# Patient Record
Sex: MALE | NOT HISPANIC OR LATINO | Employment: UNEMPLOYED | URBAN - METROPOLITAN AREA
[De-identification: names, ages, dates, MRNs, and addresses within clinical notes are randomized per-mention and may not be internally consistent; named-entity substitution may affect disease eponyms.]

---

## 2018-06-25 ENCOUNTER — TRANSFERRED RECORDS (OUTPATIENT)
Dept: HEALTH INFORMATION MANAGEMENT | Facility: CLINIC | Age: 48
End: 2018-06-25

## 2018-12-17 DIAGNOSIS — Z21 HIV (HUMAN IMMUNODEFICIENCY VIRUS INFECTION) (H): ICD-10-CM

## 2018-12-17 DIAGNOSIS — B20 HUMAN IMMUNODEFICIENCY VIRUS (HIV) DISEASE (H): Primary | ICD-10-CM

## 2018-12-17 DIAGNOSIS — Z21 HIV (HUMAN IMMUNODEFICIENCY VIRUS INFECTION) (H): Primary | ICD-10-CM

## 2018-12-17 LAB
ALBUMIN SERPL-MCNC: 3.8 G/DL (ref 3.4–5)
ALP SERPL-CCNC: 67 U/L (ref 40–150)
ALT SERPL W P-5'-P-CCNC: 28 U/L (ref 0–70)
ANION GAP SERPL CALCULATED.3IONS-SCNC: 9 MMOL/L (ref 3–14)
AST SERPL W P-5'-P-CCNC: 12 U/L (ref 0–45)
BASOPHILS # BLD AUTO: 0.1 10E9/L (ref 0–0.2)
BASOPHILS NFR BLD AUTO: 0.8 %
BILIRUB SERPL-MCNC: 0.4 MG/DL (ref 0.2–1.3)
BUN SERPL-MCNC: 9 MG/DL (ref 7–30)
CALCIUM SERPL-MCNC: 9 MG/DL (ref 8.5–10.1)
CHLORIDE SERPL-SCNC: 107 MMOL/L (ref 94–109)
CO2 SERPL-SCNC: 23 MMOL/L (ref 20–32)
CREAT SERPL-MCNC: 0.9 MG/DL (ref 0.66–1.25)
DIFFERENTIAL METHOD BLD: NORMAL
EOSINOPHIL # BLD AUTO: 0.2 10E9/L (ref 0–0.7)
EOSINOPHIL NFR BLD AUTO: 1.5 %
ERYTHROCYTE [DISTWIDTH] IN BLOOD BY AUTOMATED COUNT: 12.4 % (ref 10–15)
GFR SERPL CREATININE-BSD FRML MDRD: 89 ML/MIN/1.7M2
GLUCOSE SERPL-MCNC: 85 MG/DL (ref 70–99)
HCT VFR BLD AUTO: 49.2 % (ref 40–53)
HGB BLD-MCNC: 16.6 G/DL (ref 13.3–17.7)
IMM GRANULOCYTES # BLD: 0 10E9/L (ref 0–0.4)
IMM GRANULOCYTES NFR BLD: 0.1 %
LYMPHOCYTES # BLD AUTO: 3.2 10E9/L (ref 0.8–5.3)
LYMPHOCYTES NFR BLD AUTO: 32.6 %
MCH RBC QN AUTO: 31.7 PG (ref 26.5–33)
MCHC RBC AUTO-ENTMCNC: 33.7 G/DL (ref 31.5–36.5)
MCV RBC AUTO: 94 FL (ref 78–100)
MONOCYTES # BLD AUTO: 0.5 10E9/L (ref 0–1.3)
MONOCYTES NFR BLD AUTO: 5.2 %
NEUTROPHILS # BLD AUTO: 5.8 10E9/L (ref 1.6–8.3)
NEUTROPHILS NFR BLD AUTO: 59.8 %
NRBC # BLD AUTO: 0 10*3/UL
NRBC BLD AUTO-RTO: 0 /100
PLATELET # BLD AUTO: 202 10E9/L (ref 150–450)
POTASSIUM SERPL-SCNC: 3.9 MMOL/L (ref 3.4–5.3)
PROT SERPL-MCNC: 7.5 G/DL (ref 6.8–8.8)
RBC # BLD AUTO: 5.24 10E12/L (ref 4.4–5.9)
SODIUM SERPL-SCNC: 139 MMOL/L (ref 133–144)
WBC # BLD AUTO: 9.8 10E9/L (ref 4–11)

## 2018-12-17 PROCEDURE — 86359 T CELLS TOTAL COUNT: CPT | Performed by: INTERNAL MEDICINE

## 2018-12-17 PROCEDURE — 87536 HIV-1 QUANT&REVRSE TRNSCRPJ: CPT | Performed by: INTERNAL MEDICINE

## 2018-12-17 PROCEDURE — 86360 T CELL ABSOLUTE COUNT/RATIO: CPT | Performed by: INTERNAL MEDICINE

## 2018-12-17 NOTE — PROGRESS NOTES
OK per Dr. Lugo to order refills: Descovy, 1 tablet daily and Tivicay 50 mg, 1 tablet daily. Pt just arrived in [place] and is establishing care. He has 3 tablets of each medication left. Verified medication/doses per prescription bottles.      Padma Snyder, [facility] Pharmacist.   [phone]

## 2018-12-17 NOTE — PROGRESS NOTES
Per [facility] Ambulatory Care Protocol, Pt is due for routine labs based on disease state or monitoring of medications. Lab orders entered per clinic protocol.  Lillie Garcia RN

## 2018-12-18 LAB
CD3 CELLS # BLD: 2778 CELLS/UL (ref 603–2990)
CD3 CELLS NFR BLD: 79 % (ref 49–84)
CD3+CD4+ CELLS # BLD: 1630 CELLS/UL (ref 441–2156)
CD3+CD4+ CELLS NFR BLD: 46 % (ref 28–63)
CD3+CD4+ CELLS/CD3+CD8+ CLL BLD: 1.53 % (ref 1.4–2.6)
CD3+CD8+ CELLS # BLD: 1066 CELLS/UL (ref 125–1312)
CD3+CD8+ CELLS NFR BLD: 30 % (ref 10–40)
IFC SPECIMEN: NORMAL

## 2018-12-19 LAB
HIV1 RNA # PLAS NAA DL=20: 42 {COPIES}/ML
HIV1 RNA SERPL NAA+PROBE-LOG#: 1.6 {LOG_COPIES}/ML

## 2019-01-11 DIAGNOSIS — B20 HUMAN IMMUNODEFICIENCY VIRUS (HIV) DISEASE (H): ICD-10-CM

## 2019-02-04 ENCOUNTER — OFFICE VISIT (OUTPATIENT)
Dept: INFECTIOUS DISEASES | Facility: CLINIC | Age: 49
End: 2019-02-04
Attending: INTERNAL MEDICINE
Payer: COMMERCIAL

## 2019-02-04 VITALS
HEART RATE: 78 BPM | HEIGHT: 68 IN | BODY MASS INDEX: 22.28 KG/M2 | WEIGHT: 147 LBS | TEMPERATURE: 97.7 F | DIASTOLIC BLOOD PRESSURE: 72 MMHG | SYSTOLIC BLOOD PRESSURE: 111 MMHG | OXYGEN SATURATION: 99 %

## 2019-02-04 DIAGNOSIS — B20 HUMAN IMMUNODEFICIENCY VIRUS (HIV) DISEASE (H): Primary | ICD-10-CM

## 2019-02-04 DIAGNOSIS — Z11.1 SCREENING FOR TUBERCULOSIS: ICD-10-CM

## 2019-02-04 DIAGNOSIS — A63.0 GENITAL WARTS DUE TO HPV (HUMAN PAPILLOMAVIRUS): ICD-10-CM

## 2019-02-04 DIAGNOSIS — Z11.3 SCREEN FOR SEXUALLY TRANSMITTED DISEASES: ICD-10-CM

## 2019-02-04 DIAGNOSIS — K02.9 DENTAL CARIES: ICD-10-CM

## 2019-02-04 PROCEDURE — G0463 HOSPITAL OUTPT CLINIC VISIT: HCPCS | Mod: ZF

## 2019-02-04 ASSESSMENT — PAIN SCALES - GENERAL: PAINLEVEL: NO PAIN (0)

## 2019-02-04 ASSESSMENT — MIFFLIN-ST. JEOR: SCORE: 1506.29

## 2019-02-04 NOTE — LETTER
Date:March 12, 2019      Patient was self referred, no letter generated. Do not send.        [facility] Health Information

## 2019-02-04 NOTE — LETTER
2/4/2019      RE: Sara Benjamin  [address]  [facility] [address]         INFECTIOUS DISEASE CLINIC    REASON FOR VISIT:   New outpatient consultation, establish HIV care    REFERRED BY:  self referred    HISTORY OF PRESENT ILLNESS:   Sara Benjamin is a 47 y/o man originally from [facility].   He was most recently in [place], [place] and is now moving to [place].  He is here to establish care for his HIV infection.  A professional  is used for the language Tigrinya.    Last absolute CD4 - 1630 from 12/17/2018    Mr. Benjamin reports first learning of his HIV diagnosis about 18 years ago in [facility].  Both he and his wife tested positive.  He did not start treatment right away, but was given medications eventually when his wife was pregnant for this first time. (The eldest child is 11 years old). He reports that he and his wife took the same medicine.  He cannot confirm, but from his description it seems most likely that this was Atripla.   He also remembers taking another medicine for awhile, and then he could stop that one. Again, he cannot confirm, but this sounds like Bactrim.  He remembers being treated for TB for 6 months, and another time for a pneumonia.  Other than that he does not recall any significant illnesses.  Later he tells me about skin lesions he has on his groin and genitals that have been longstanding.    [place] came to [place] via [place] first four years ago.  He has a brother in [place] and came over for work opportunities.  His wife and three children (aged 11, 8, and 6) have not moved yet, but he hopes to bring them soon.  Wife took medications during each pregnancy and the children were not infected with HIV.     Mr. Benjamin now comes to [place] in search of better job opportunities.  A friend is here working as a .    Current antiretroviral therapy is Descovy (tenofovir alafenamide/emtricitabine) and dolutegravir. The patient reports this  "regimen for the past 1 year.  He is tolerating well, and reports not missing any doses.    Mr. Benjamin denies any acute illnesses, no fevers, chills, or sweats.  No cough or SOB.  No diarrhea or nausea/vomiting.   He describes a longstanding history of warts on the groin and genitals.  He has received treatment and seen a dermatologist in the past.   He thinks he has new lesions on the penis.    He denies any sexual activity and declines any sexually transmitted infection screening tests.      PAST MEDICAL HISTORY:    HIV  Hx of TB, treated in [facility]    PAST SURGICAL HISTORY:  Groin and genital warts treated in the past, cannot specify    FAMILY PAST MEDICAL HISTORY:  Children are healthy  Denies any other family past medical history    SOCIAL HISTORY:  As above, originally from [facility]  Here in the  for the last 4 years  Moved recently from [place], [place]    Wife and 3 children are waiting to move here    CURRENT MEDICATIONS:    Current Outpatient Medications   Medication     dolutegravir (TIVICAY) 50 MG tablet     emtricitabine-tenofovir AF (DESCOVY) 200-25 MG per tablet     ALLERGIES:    No Known Allergies    REVIEW OF SYSTEMS: A full 12-point review of systems was obtained, pertinent positives and negatives as above.     PHYSICAL EXAMINATION:    VITAL SIGNS: /72   Pulse 78   Temp 97.7  F (36.5  C) (Oral)   Ht 1.727 m (5' 8\")   Wt 66.7 kg (147 lb)   SpO2 99%   BMI 22.35 kg/m     GENERAL:   No acute distress, thin but not cachectic   HEENT: No icterus or injection. Oropharynx moist and clear without lesions or exudate.  Poor dentition, several caries and missing teeth    NECK: Supple and nontender  LYMPH:  No cervical, axillary or inguinal lymphadenopathy  LUNGS: Clear to ausculation bilaterally without any increased work of breathing  HEART: Regular rate and rhythm and no murmur, gallop or rub    ABDOMEN: Normoactive bowel sounds, soft, nontender, nondistended, no hepatosplenomegaly.  "   /GYN:  extensive large condyloma acuminata throughout groin and genital area, new lesions on penis  EXTREMITIES: Warm and well perfused without clubbing, cyanosis, or edema  SKIN:  No other skin lesions or rashes  NEURO:  Awake, alert, no focal neurologic deficits.  PSYCH: Affect normal. Speech fluent and appropriate.     LABORATORY DATA:    The following labs were ordered by this clinic based on protocol, patient was not previously seen by a provider    Orders Only on 12/17/2018   Component Date Value Ref Range Status     IFC Specimen 12/17/2018 Blood   Final     CD3 Mature T 12/17/2018 79  49 - 84 % Final     CD4 [place] T 12/17/2018 46  28 - 63 % Final     CD8 Suppressor T 12/17/2018 30  10 - 40 % Final     CD4:CD8 Ratio 12/17/2018 1.53  1.40 - 2.60 Final     Absolute CD3 12/17/2018 2,778  603 - 2,990 cells/uL Final     Absolute CD4 12/17/2018 1,630  441 - 2,156 cells/uL Final     Absolute CD8 12/17/2018 1,066  125 - 1,312 cells/uL Final     HIV-1 RNA Quant Result 12/17/2018 42* HIVND^HIV-1 RNA Not Detected [Copies]/mL Final     HIV RNA QT Log 12/17/2018 1.6* <1.3 [Log_copies]/mL Final     WBC 12/17/2018 9.8  4.0 - 11.0 10e9/L Final     RBC Count 12/17/2018 5.24  4.4 - 5.9 10e12/L Final     Hemoglobin 12/17/2018 16.6  13.3 - 17.7 g/dL Final     Hematocrit 12/17/2018 49.2  40.0 - 53.0 % Final     MCV 12/17/2018 94  78 - 100 fl Final     MCH 12/17/2018 31.7  26.5 - 33.0 pg Final     MCHC 12/17/2018 33.7  31.5 - 36.5 g/dL Final     RDW 12/17/2018 12.4  10.0 - 15.0 % Final     Platelet Count 12/17/2018 202  150 - 450 10e9/L Final     Diff Method 12/17/2018 Auto   Final     % Neutrophils 12/17/2018 59.8  % Final     % Lymphocytes 12/17/2018 32.6  % Final     % Monocytes 12/17/2018 5.2  % Final     % Eosinophils 12/17/2018 1.5  % Final     % Basophils 12/17/2018 0.8  % Final     % Immature Granulocytes 12/17/2018 0.1  % Final     Nucleated RBCs 12/17/2018 0  0 /100 Final     Absolute Neutrophil 12/17/2018 5.8  1.6 -  8.3 10e9/L Final     Absolute Lymphocytes 12/17/2018 3.2  0.8 - 5.3 10e9/L Final     Absolute Monocytes 12/17/2018 0.5  0.0 - 1.3 10e9/L Final     Absolute Eosinophils 12/17/2018 0.2  0.0 - 0.7 10e9/L Final     Absolute Basophils 12/17/2018 0.1  0.0 - 0.2 10e9/L Final     Abs Immature Granulocytes 12/17/2018 0.0  0 - 0.4 10e9/L Final     Absolute Nucleated RBC 12/17/2018 0.0   Final     Sodium 12/17/2018 139  133 - 144 mmol/L Final     Potassium 12/17/2018 3.9  3.4 - 5.3 mmol/L Final     Chloride 12/17/2018 107  94 - 109 mmol/L Final     Carbon Dioxide 12/17/2018 23  20 - 32 mmol/L Final     Anion Gap 12/17/2018 9  3 - 14 mmol/L Final     Glucose 12/17/2018 85  70 - 99 mg/dL Final     Urea Nitrogen 12/17/2018 9  7 - 30 mg/dL Final     Creatinine 12/17/2018 0.90  0.66 - 1.25 mg/dL Final     GFR Estimate 12/17/2018 89  >60 mL/min/1.7m2 Final    Non  GFR Calc     GFR Estimate If Black 12/17/2018 >90  >60 mL/min/1.7m2 Final    African American GFR Calc     Calcium 12/17/2018 9.0  8.5 - 10.1 mg/dL Final     Bilirubin Total 12/17/2018 0.4  0.2 - 1.3 mg/dL Final     Albumin 12/17/2018 3.8  3.4 - 5.0 g/dL Final     Protein Total 12/17/2018 7.5  6.8 - 8.8 g/dL Final     Alkaline Phosphatase 12/17/2018 67  40 - 150 U/L Final     ALT 12/17/2018 28  0 - 70 U/L Final     AST 12/17/2018 12  0 - 45 U/L Final       ASSESSMENT AND PLAN:       1. HIV.    Based on the last labs from 12/17/2018, the patient is doing well in regards to his immune system with an absolute CD4 of 1630.   Viral load was 42 - which may reflect a lapse in medication refills.  At the time the patient said he had 3 pills of each of his medicines left.    We will see what the viral load is today.    To simplify the regimen to a single tablet daily, we offered Biktarvy today (tenofovir alafenamide/emtricitabine/bictegravir).  This is a relatively simple switch that just changes dolutegravir > bictegravir.   I explained that this would not be very  different from his current regimen other than it being one single tablet.  I do not expect any new adverse effects with this switch.   We would still like to repeat labs in 1 month to ensure there are no new toxicities, and the new regimen is efficacious.    Today we will order routine labs and also the new HIV patient labs needed to complete a full work-up.  We do not have any records from his previous HIV providers.    Orders Placed This Encounter   Procedures     HIV-1 RNA quantitative     Hepatitis B core antibody     Comprehensive metabolic panel [LAB17]     HLA Single Antigen Allele Type [[id number]]     CBC with platelets differential [EQJ400]     Routine UA with microscopic [TBG5833]     Hepatitis A Antibody IgG     Hepatitis B core antibody [[id number]]     CMV Antibody IgG [[id number]]     Toxoplasma antibody IgG [[id number]]     Treponema Abs w Reflex to RPR and Titer [[id number]]                 2.  Large condyloma acuminata of the groin and genital regions - very extensive.  Will refer to Dermatology    3.  Poor dentition.  Will refer for dental care.    DERMATOLOGY REFERRAL   DENTAL REFERRAL       Follow-up:  Return in 6 weeks for repeat labs after switch to Biktarvy and short interval follow-up.    Thank you for referring this patient to the Infectious Disease clinic.    Bartolo Amor MD, MS    Infectious Disease    pager: [phone]      I spent 60 minutes in direct care with this patient, including >50% of time face-to-face with the patient counseling about HIV care, what to expect from our clinic, the importance of close follow-up for HIV, and the importance of returning after starting a new medication.    Bartolo Amor MD

## 2019-02-04 NOTE — LETTER
2/4/2019       RE: Sara Benjamin  [address]  [facility] [address]     Dear Colleague,    Thank you for referring your patient, Sara Benjamin, to the [facility] AND INFECTIOUS DISEASES at [facility]. Please see a copy of my visit note below.      INFECTIOUS DISEASE CLINIC    REASON FOR VISIT:   New outpatient consultation, establish HIV care    REFERRED BY:  self referred    HISTORY OF PRESENT ILLNESS:   Sara Benjamin is a 49 y/o man originally from [facility].   He was most recently in [place], [place] and is now moving to [place].  He is here to establish care for his HIV infection.  A professional  is used for the language Tigrinya.    Last absolute CD4 - 1630 from 12/17/2018    Mr. Benjamin reports first learning of his HIV diagnosis about 18 years ago in [facility].  Both he and his wife tested positive.  He did not start treatment right away, but was given medications eventually when his wife was pregnant for this first time. (The eldest child is 11 years old). He reports that he and his wife took the same medicine.  He cannot confirm, but from his description it seems most likely that this was Atripla.   He also remembers taking another medicine for awhile, and then he could stop that one. Again, he cannot confirm, but this sounds like Bactrim.  He remembers being treated for TB for 6 months, and another time for a pneumonia.  Other than that he does not recall any significant illnesses.  Later he tells me about skin lesions he has on his groin and genitals that have been longstanding.    [place] came [place] via [place] first four years ago.  He has a brother in [place] and came over for work opportunities.  His wife and three children (aged 11, 8, and 6) have not moved yet, but he hopes to bring them soon.  Wife took medications during each pregnancy and the children were not infected with HIV.     Mr. Benjamin now  "comes to [place] in search of better job opportunities.  A friend is here working as a .    Current antiretroviral therapy is Descovy (tenofovir alafenamide/emtricitabine) and dolutegravir. The patient reports this regimen for the past 1 year.  He is tolerating well, and reports not missing any doses.    Mr. Benjaimn denies any acute illnesses, no fevers, chills, or sweats.  No cough or SOB.  No diarrhea or nausea/vomiting.   He describes a longstanding history of warts on the groin and genitals.  He has received treatment and seen a dermatologist in the past.   He thinks he has new lesions on the penis.    He denies any sexual activity and declines any sexually transmitted infection screening tests.      PAST MEDICAL HISTORY:    HIV  Hx of TB, treated in [facility]    PAST SURGICAL HISTORY:  Groin and genital warts treated in the past, cannot specify    FAMILY PAST MEDICAL HISTORY:  Children are healthy  Denies any other family past medical history    SOCIAL HISTORY:  As above, originally from [facility]  Here in the  for the last 4 years  Moved recently from [place], [place]    Wife and 3 children are waiting to move here    CURRENT MEDICATIONS:    Current Outpatient Medications   Medication     dolutegravir (TIVICAY) 50 MG tablet     emtricitabine-tenofovir AF (DESCOVY) 200-25 MG per tablet     ALLERGIES:    No Known Allergies    REVIEW OF SYSTEMS: A full 12-point review of systems was obtained, pertinent positives and negatives as above.     PHYSICAL EXAMINATION:    VITAL SIGNS: /72   Pulse 78   Temp 97.7  F (36.5  C) (Oral)   Ht 1.727 m (5' 8\")   Wt 66.7 kg (147 lb)   SpO2 99%   BMI 22.35 kg/m     GENERAL:   No acute distress, thin but not cachectic   HEENT: No icterus or injection. Oropharynx moist and clear without lesions or exudate.  Poor dentition, several caries and missing teeth    NECK: Supple and nontender  LYMPH:  No cervical, axillary or inguinal lymphadenopathy  LUNGS: Clear " to ausculation bilaterally without any increased work of breathing  HEART: Regular rate and rhythm and no murmur, gallop or rub    ABDOMEN: Normoactive bowel sounds, soft, nontender, nondistended, no hepatosplenomegaly.    /GYN:  extensive large condyloma acuminata throughout groin and genital area, new lesions on penis  EXTREMITIES: Warm and well perfused without clubbing, cyanosis, or edema  SKIN:  No other skin lesions or rashes  NEURO:  Awake, alert, no focal neurologic deficits.  PSYCH: Affect normal. Speech fluent and appropriate.     LABORATORY DATA:    The following labs were ordered by this clinic based on protocol, patient was not previously seen by a provider    Orders Only on 12/17/2018   Component Date Value Ref Range Status     IFC Specimen 12/17/2018 Blood   Final     CD3 Mature T 12/17/2018 79  49 - 84 % Final     CD4 [place] T 12/17/2018 46  28 - 63 % Final     CD8 Suppressor T 12/17/2018 30  10 - 40 % Final     CD4:CD8 Ratio 12/17/2018 1.53  1.40 - 2.60 Final     Absolute CD3 12/17/2018 2,778  603 - 2,990 cells/uL Final     Absolute CD4 12/17/2018 1,630  441 - 2,156 cells/uL Final     Absolute CD8 12/17/2018 1,066  125 - 1,312 cells/uL Final     HIV-1 RNA Quant Result 12/17/2018 42* HIVND^HIV-1 RNA Not Detected [Copies]/mL Final     HIV RNA QT Log 12/17/2018 1.6* <1.3 [Log_copies]/mL Final     WBC 12/17/2018 9.8  4.0 - 11.0 10e9/L Final     RBC Count 12/17/2018 5.24  4.4 - 5.9 10e12/L Final     Hemoglobin 12/17/2018 16.6  13.3 - 17.7 g/dL Final     Hematocrit 12/17/2018 49.2  40.0 - 53.0 % Final     MCV 12/17/2018 94  78 - 100 fl Final     MCH 12/17/2018 31.7  26.5 - 33.0 pg Final     MCHC 12/17/2018 33.7  31.5 - 36.5 g/dL Final     RDW 12/17/2018 12.4  10.0 - 15.0 % Final     Platelet Count 12/17/2018 202  150 - 450 10e9/L Final     Diff Method 12/17/2018 Auto   Final     % Neutrophils 12/17/2018 59.8  % Final     % Lymphocytes 12/17/2018 32.6  % Final     % Monocytes 12/17/2018 5.2  % Final      % Eosinophils 12/17/2018 1.5  % Final     % Basophils 12/17/2018 0.8  % Final     % Immature Granulocytes 12/17/2018 0.1  % Final     Nucleated RBCs 12/17/2018 0  0 /100 Final     Absolute Neutrophil 12/17/2018 5.8  1.6 - 8.3 10e9/L Final     Absolute Lymphocytes 12/17/2018 3.2  0.8 - 5.3 10e9/L Final     Absolute Monocytes 12/17/2018 0.5  0.0 - 1.3 10e9/L Final     Absolute Eosinophils 12/17/2018 0.2  0.0 - 0.7 10e9/L Final     Absolute Basophils 12/17/2018 0.1  0.0 - 0.2 10e9/L Final     Abs Immature Granulocytes 12/17/2018 0.0  0 - 0.4 10e9/L Final     Absolute Nucleated RBC 12/17/2018 0.0   Final     Sodium 12/17/2018 139  133 - 144 mmol/L Final     Potassium 12/17/2018 3.9  3.4 - 5.3 mmol/L Final     Chloride 12/17/2018 107  94 - 109 mmol/L Final     Carbon Dioxide 12/17/2018 23  20 - 32 mmol/L Final     Anion Gap 12/17/2018 9  3 - 14 mmol/L Final     Glucose 12/17/2018 85  70 - 99 mg/dL Final     Urea Nitrogen 12/17/2018 9  7 - 30 mg/dL Final     Creatinine 12/17/2018 0.90  0.66 - 1.25 mg/dL Final     GFR Estimate 12/17/2018 89  >60 mL/min/1.7m2 Final    Non  GFR Calc     GFR Estimate If Black 12/17/2018 >90  >60 mL/min/1.7m2 Final    African American GFR Calc     Calcium 12/17/2018 9.0  8.5 - 10.1 mg/dL Final     Bilirubin Total 12/17/2018 0.4  0.2 - 1.3 mg/dL Final     Albumin 12/17/2018 3.8  3.4 - 5.0 g/dL Final     Protein Total 12/17/2018 7.5  6.8 - 8.8 g/dL Final     Alkaline Phosphatase 12/17/2018 67  40 - 150 U/L Final     ALT 12/17/2018 28  0 - 70 U/L Final     AST 12/17/2018 12  0 - 45 U/L Final       ASSESSMENT AND PLAN:       1. HIV.    Based on the last labs from 12/17/2018, the patient is doing well in regards to his immune system with an absolute CD4 of 1630.   Viral load was 42 - which may reflect a lapse in medication refills.  At the time the patient said he had 3 pills of each of his medicines left.    We will see what the viral load is today.    To simplify the regimen to a  single tablet daily, we offered Biktarvy today (tenofovir alafenamide/emtricitabine/bictegravir).  This is a relatively simple switch that just changes dolutegravir > bictegravir.   I explained that this would not be very different from his current regimen other than it being one single tablet.  I do not expect any new adverse effects with this switch.   We would still like to repeat labs in 1 month to ensure there are no new toxicities, and the new regimen is efficacious.    Today we will order routine labs and also the new HIV patient labs needed to complete a full work-up.  We do not have any records from his previous HIV providers.    Orders Placed This Encounter   Procedures     HIV-1 RNA quantitative     Hepatitis B core antibody     Comprehensive metabolic panel [LAB17]     HLA Single Antigen [place]lele Type [[id number]]     CBC with platelets differential [[id number]]     Routine UA with microscopic [[id number]]     Hepatitis A Antibody IgG     Hepatitis B core antibody [[id number]]     CMV Antibody IgG [MZC7019]     Toxoplasma antibody IgG [PYN2147]     Treponema Abs w Reflex to RPR and Titer [[id number]]                 2.  Large condyloma acuminata of the groin and genital regions - very extensive.  Will refer to Dermatology    3.  Poor dentition.  Will refer for dental care.    DERMATOLOGY REFERRAL   DENTAL REFERRAL       Follow-up:  Return in 6 weeks for repeat labs after switch to Biktarvy and short interval follow-up.    Thank you for referring this patient to the Infectious Disease clinic.    Bartolo Amor MD, MS    Infectious Disease    pager: [phone]      I spent 60 minutes in direct care with this patient, including >50% of time face-to-face with the patient counseling about HIV care, what to expect from our clinic, the importance of close follow-up for HIV, and the importance of returning after starting a new medication.    Again, thank you for allowing me to participate in the care of your patient.       Sincerely,    Bartolo Amor MD

## 2019-02-04 NOTE — PROGRESS NOTES
INFECTIOUS DISEASE CLINIC    REASON FOR VISIT:   New outpatient consultation, establish HIV care    REFERRED BY:  self referred    HISTORY OF PRESENT ILLNESS:   Sara Benjamin is a 49 y/o man originally from [facility].   He was most recently in [place], [place] and is now moving to [place].  He is here to establish care for his HIV infection.  A professional  is used for the language Tigrinya.    Last absolute CD4 - 1630 from 12/17/2018    Mr. Benjamin reports first learning of his HIV diagnosis about 18 years ago in [facility].  Both he and his wife tested positive.  He did not start treatment right away, but was given medications eventually when his wife was pregnant for this first time. (The eldest child is 11 years old). He reports that he and his wife took the same medicine.  He cannot confirm, but from his description it seems most likely that this was Atripla.   He also remembers taking another medicine for awhile, and then he could stop that one. Again, he cannot confirm, but this sounds like Bactrim.  He remembers being treated for TB for 6 months, and another time for a pneumonia.  Other than that he does not recall any significant illnesses.  Later he tells me about skin lesions he has on his groin and genitals that have been longstanding.    He came to [place] via [place] first four years ago.  He has a brother in [place] and came over for work opportunities.  His wife and three children (aged 11, 8, and 6) have not moved yet, but he hopes to bring them soon.  Wife took medications during each pregnancy and the children were not infected with HIV.     Mr. Benjamin now comes to [place] in search of better job opportunities.  A friend is here working as a .    Current antiretroviral therapy is Descovy (tenofovir alafenamide/emtricitabine) and dolutegravir. The patient reports this regimen for the past 1 year.  He is tolerating well, and reports not missing  "any doses.    Mr. Benjamin denies any acute illnesses, no fevers, chills, or sweats.  No cough or SOB.  No diarrhea or nausea/vomiting.   He describes a longstanding history of warts on the groin and genitals.  He has received treatment and seen a dermatologist in the past.   He thinks he has new lesions on the penis.    He denies any sexual activity and declines any sexually transmitted infection screening tests.      PAST MEDICAL HISTORY:    HIV  Hx of TB, treated in [facility]    PAST SURGICAL HISTORY:  Groin and genital warts treated in the past, cannot specify    FAMILY PAST MEDICAL HISTORY:  Children are healthy  Denies any other family past medical history    SOCIAL HISTORY:  As above, originally from [facility]  Here in the  for the last 4 years  Moved recently from [place], [place]    Wife and 3 children are waiting to move here    CURRENT MEDICATIONS:    Current Outpatient Medications   Medication     dolutegravir (TIVICAY) 50 MG tablet     emtricitabine-tenofovir AF (DESCOVY) 200-25 MG per tablet     ALLERGIES:    No Known Allergies    REVIEW OF SYSTEMS: A full 12-point review of systems was obtained, pertinent positives and negatives as above.     PHYSICAL EXAMINATION:    VITAL SIGNS: /72   Pulse 78   Temp 97.7  F (36.5  C) (Oral)   Ht 1.727 m (5' 8\")   Wt 66.7 kg (147 lb)   SpO2 99%   BMI 22.35 kg/m    GENERAL:   No acute distress, thin but not cachectic   HEENT: No icterus or injection. Oropharynx moist and clear without lesions or exudate.  Poor dentition, several caries and missing teeth    NECK: Supple and nontender  LYMPH:  No cervical, axillary or inguinal lymphadenopathy  LUNGS: Clear to ausculation bilaterally without any increased work of breathing  HEART: Regular rate and rhythm and no murmur, gallop or rub    ABDOMEN: Normoactive bowel sounds, soft, nontender, nondistended, no hepatosplenomegaly.    /GYN:  extensive large condyloma acuminata throughout groin and genital area, " new lesions on penis  EXTREMITIES: Warm and well perfused without clubbing, cyanosis, or edema  SKIN:  No other skin lesions or rashes  NEURO:  Awake, alert, no focal neurologic deficits.  PSYCH: Affect normal. Speech fluent and appropriate.     LABORATORY DATA:    The following labs were ordered by this clinic based on protocol, patient was not previously seen by a provider    Orders Only on 12/17/2018   Component Date Value Ref Range Status     IFC Specimen 12/17/2018 Blood   Final     CD3 Mature T 12/17/2018 79  49 - 84 % Final     CD4 Ward T 12/17/2018 46  28 - 63 % Final     CD8 Suppressor T 12/17/2018 30  10 - 40 % Final     CD4:CD8 Ratio 12/17/2018 1.53  1.40 - 2.60 Final     Absolute CD3 12/17/2018 2,778  603 - 2,990 cells/uL Final     Absolute CD4 12/17/2018 1,630  441 - 2,156 cells/uL Final     Absolute CD8 12/17/2018 1,066  125 - 1,312 cells/uL Final     HIV-1 RNA Quant Result 12/17/2018 42* HIVND^HIV-1 RNA Not Detected [Copies]/mL Final     HIV RNA QT Log 12/17/2018 1.6* <1.3 [Log_copies]/mL Final     WBC 12/17/2018 9.8  4.0 - 11.0 10e9/L Final     RBC Count 12/17/2018 5.24  4.4 - 5.9 10e12/L Final     Hemoglobin 12/17/2018 16.6  13.3 - 17.7 g/dL Final     Hematocrit 12/17/2018 49.2  40.0 - 53.0 % Final     MCV 12/17/2018 94  78 - 100 fl Final     MCH 12/17/2018 31.7  26.5 - 33.0 pg Final     MCHC 12/17/2018 33.7  31.5 - 36.5 g/dL Final     RDW 12/17/2018 12.4  10.0 - 15.0 % Final     Platelet Count 12/17/2018 202  150 - 450 10e9/L Final     Diff Method 12/17/2018 Auto   Final     % Neutrophils 12/17/2018 59.8  % Final     % Lymphocytes 12/17/2018 32.6  % Final     % Monocytes 12/17/2018 5.2  % Final     % Eosinophils 12/17/2018 1.5  % Final     % Basophils 12/17/2018 0.8  % Final     % Immature Granulocytes 12/17/2018 0.1  % Final     Nucleated RBCs 12/17/2018 0  0 /100 Final     Absolute Neutrophil 12/17/2018 5.8  1.6 - 8.3 10e9/L Final     Absolute Lymphocytes 12/17/2018 3.2  0.8 - 5.3 10e9/L Final      Absolute Monocytes 12/17/2018 0.5  0.0 - 1.3 10e9/L Final     Absolute Eosinophils 12/17/2018 0.2  0.0 - 0.7 10e9/L Final     Absolute Basophils 12/17/2018 0.1  0.0 - 0.2 10e9/L Final     Abs Immature Granulocytes 12/17/2018 0.0  0 - 0.4 10e9/L Final     Absolute Nucleated RBC 12/17/2018 0.0   Final     Sodium 12/17/2018 139  133 - 144 mmol/L Final     Potassium 12/17/2018 3.9  3.4 - 5.3 mmol/L Final     Chloride 12/17/2018 107  94 - 109 mmol/L Final     Carbon Dioxide 12/17/2018 23  20 - 32 mmol/L Final     Anion Gap 12/17/2018 9  3 - 14 mmol/L Final     Glucose 12/17/2018 85  70 - 99 mg/dL Final     Urea Nitrogen 12/17/2018 9  7 - 30 mg/dL Final     Creatinine 12/17/2018 0.90  0.66 - 1.25 mg/dL Final     GFR Estimate 12/17/2018 89  >60 mL/min/1.7m2 Final    Non  GFR Calc     GFR Estimate If Black 12/17/2018 >90  >60 mL/min/1.7m2 Final    African American GFR Calc     Calcium 12/17/2018 9.0  8.5 - 10.1 mg/dL Final     Bilirubin Total 12/17/2018 0.4  0.2 - 1.3 mg/dL Final     Albumin 12/17/2018 3.8  3.4 - 5.0 g/dL Final     Protein Total 12/17/2018 7.5  6.8 - 8.8 g/dL Final     Alkaline Phosphatase 12/17/2018 67  40 - 150 U/L Final     ALT 12/17/2018 28  0 - 70 U/L Final     AST 12/17/2018 12  0 - 45 U/L Final       ASSESSMENT AND PLAN:       1. HIV.    Based on the last labs from 12/17/2018, the patient is doing well in regards to his immune system with an absolute CD4 of 1630.   Viral load was 42 - which may reflect a lapse in medication refills.  At the time the patient said he had 3 pills of each of his medicines left.    We will see what the viral load is today.    To simplify the regimen to a single tablet daily, we offered Biktarvy today (tenofovir alafenamide/emtricitabine/bictegravir).  This is a relatively simple switch that just changes dolutegravir > bictegravir.   I explained that this would not be very different from his current regimen other than it being one single tablet.  I do  not expect any new adverse effects with this switch.   We would still like to repeat labs in 1 month to ensure there are no new toxicities, and the new regimen is efficacious.    Today we will order routine labs and also the new HIV patient labs needed to complete a full work-up.  We do not have any records from his previous HIV providers.    Orders Placed This Encounter   Procedures     HIV-1 RNA quantitative     Hepatitis B core antibody     Comprehensive metabolic panel [LAB17]     HLA Single Antigen Allele Type [BJG4548]     CBC with platelets differential [RTQ658]     Routine UA with microscopic [PXB8642]     Hepatitis A Antibody IgG     Hepatitis B core antibody [VQF9438]     CMV Antibody IgG [UZV2116]     Toxoplasma antibody IgG [DET7755]     Treponema Abs w Reflex to RPR and Titer [RCE7306]                 2.  Large condyloma acuminata of the groin and genital regions - very extensive.  Will refer to Dermatology    3.  Poor dentition.  Will refer for dental care.    DERMATOLOGY REFERRAL   DENTAL REFERRAL       Follow-up:  Return in 6 weeks for repeat labs after switch to Biktarvy and short interval follow-up.    Thank you for referring this patient to the Infectious Disease clinic.    Bartolo Amor MD, MS    Infectious Disease    pager: [phone]      I spent 60 minutes in direct care with this patient, including >50% of time face-to-face with the patient counseling about HIV care, what to expect from our clinic, the importance of close follow-up for HIV, and the importance of returning after starting a new medication.

## 2019-02-04 NOTE — NURSING NOTE
"Chief Complaint   Patient presents with     Consult     B20     /72   Pulse 78   Temp 97.7  F (36.5  C) (Oral)   Ht 1.727 m (5' 8\")   Wt 66.7 kg (147 lb)   SpO2 99%   BMI 22.35 kg/m    Cinthia Lopez CMA    "

## 2019-02-25 ENCOUNTER — TELEPHONE (OUTPATIENT)
Dept: DERMATOLOGY | Facility: CLINIC | Age: 49
End: 2019-02-25

## 2019-02-25 NOTE — TELEPHONE ENCOUNTER
Called Sara to remind him of his appointment on 3/4/19. Informed them of parking and to arrive 15 minutes early.   JERMAIN White

## 2019-03-04 ENCOUNTER — OFFICE VISIT (OUTPATIENT)
Dept: DERMATOLOGY | Facility: CLINIC | Age: 49
End: 2019-03-04
Attending: INTERNAL MEDICINE
Payer: COMMERCIAL

## 2019-03-04 VITALS — HEART RATE: 71 BPM | SYSTOLIC BLOOD PRESSURE: 117 MMHG | DIASTOLIC BLOOD PRESSURE: 78 MMHG

## 2019-03-04 DIAGNOSIS — D48.5 NEOPLASM OF UNCERTAIN BEHAVIOR OF SKIN: Primary | ICD-10-CM

## 2019-03-04 DIAGNOSIS — A63.0 CONDYLOMA ACUMINATA: ICD-10-CM

## 2019-03-04 RX ORDER — LIDOCAINE HYDROCHLORIDE AND EPINEPHRINE 10; 10 MG/ML; UG/ML
3 INJECTION, SOLUTION INFILTRATION; PERINEURAL ONCE
Status: ACTIVE | OUTPATIENT
Start: 2019-03-04

## 2019-03-04 RX ORDER — IMIQUIMOD 12.5 MG/.25G
CREAM TOPICAL
Qty: 24 PACKET | Refills: 3 | Status: SHIPPED | OUTPATIENT
Start: 2019-03-04 | End: 2019-04-05

## 2019-03-04 ASSESSMENT — PAIN SCALES - GENERAL: PAINLEVEL: NO PAIN (0)

## 2019-03-04 NOTE — PROGRESS NOTES
[facility] Dermatology Note      Dermatology Problem List:  1. Extensive large condyloma acuminata with concern for malignant transformation diffusely distributed in the groin and genital area, in context of HIV (VL 42; CD4 >1600; on antiretrovirals)   - multiple shave biopsies performed 3/4/19 on large lesions   - imiquimod started 3/4/19   - obtaining outside records as patient notes prior biopsies but unsure of results      Encounter Date: Mar 4, 2019    CC:  Chief Complaint   Patient presents with     Derm Problem     Sara is here today for a follow up visit          History of Present Illness:  Mr. Sara Benjamin is a 48 year old HIV positive man who presents as a referral from Dr. Amor for genital warts. He states that he first noticed the warts 18 years ago. He has been noticing more of the warts over time. They are mostly located around his penis and anus with one around his left ear. They are occasionally itchy and have occasionally bled. He has previously seen a provider in [place] regarding these and had some treated with liquid nitrogen and had one surgically excised around his anus. He believes he had a few of them biopsied but does not have the results. He has not tried any OTC medications for the warts. He continues to take antivirals for the HIV and follows with ID regularly. Most recent labs include a viral load of 42 and CD4 count of 1630 in 12/2018. Pt voices no other concerns today.     Past Medical History:   Patient Active Problem List   Diagnosis     Condyloma acuminata     History reviewed. No pertinent past medical history.  History reviewed. No pertinent surgical history.    Social History:  Patient reports that he has been smoking.  he has never used smokeless tobacco.    Family History:  History reviewed. No pertinent family history.    Medications:  Current Outpatient Medications   Medication Sig Dispense Refill     bictegravir-emtricitabine-tenofovir (BIKTARVY)  -25 MG per tablet Take 1 tablet by mouth daily 30 tablet 11     imiquimod (ALDARA) 5 % external cream Apply topically three times a week 24 packet 3     dolutegravir (TIVICAY) 50 MG tablet Take 1 tablet (50 mg) by mouth daily 30 tablet 0     emtricitabine-tenofovir AF (DESCOVY) 200-25 MG per tablet Take 1 tablet by mouth daily 30 tablet 0     No Known Allergies      Review of Systems:  -As per HPI  -Constitutional: Otherwise feeling well today, in usual state of health.  -HEENT: Patient denies nonhealing oral sores.  -Skin: As above in HPI. No additional skin concerns.    Physical exam:  Vitals: /78 (BP Location: Right arm, Patient Position: Sitting, Cuff Size: Adult Regular)   Pulse 71   GEN: This is a well developed, well-nourished male in no acute distress, in a pleasant mood.    SKIN: Focused examination of the face and genitalia was performed.  -large hyperpigmented verrucous papules and plaques located on penis and at base of penis and behind left ear.   -No other lesions of concern on areas examined.     Impression/Plan:  1. Extensive large condyloma acuminata with concern for malignant transformation diffusely distributed in the groin and genital area, in context of HIV (VL 42; CD4 >1600; on antiretrovirals). Via the , we discussed this concern in great detail with the patient. We will obtain outside biopsy reports, but regardless of the prior results, there exists concern based on today's examination. We also consider epidermodysplasia verruciformis as a consideration, but favor the former.     Shave biopsy x4 (performed by faculty): After discussion of benefits and risks including but not limited to bleeding, infection, scar, incomplete removal, recurrence, and non-diagnostic biopsy, written consent and photographs were obtained. Time-out was performed. The area was cleaned with isopropyl alcohol. 15 mL of 1% lidocaine was injected to obtain adequate anesthesia of the lesions on the  right inquinal fold, right base of the penis proximal, right base of the penis distal and left shaft of the penis. A shave biopsy was performed. Hemostasis was achieved with aluminium chloride. Vaseline and a sterile dressing were applied. The patient tolerated the procedure and no complications were noted. The patient was provided with verbal and written post care instructions.     Start imiquimod 5% cream three times weekly    CC Bartolo Amor MD  [address] [place] [phone] on close of this encounter.  Follow-up in 1 months, earlier for new or changing lesions.     Staff Involved:  ILevar MS4, saw and examined the patient in the presence of Dr. Stanley.    Staff Physician:  I was present with the medical student who participated in the service and in the documentation of the note. I have verified the history and personally performed the physical exam and medical decision making. I edited the assessment and plan of care as documented in the note.     The procedure(s) was(were) performed by myself.    Guillermo Stanley MD   of Dermatology  Department of Dermatology  [facility] [employer]

## 2019-03-04 NOTE — PATIENT INSTRUCTIONS

## 2019-03-04 NOTE — NURSING NOTE
Chief Complaint   Patient presents with     Derm Problem     Sara is here today for a follow up visit      Deb Wood LPN

## 2019-03-04 NOTE — LETTER
3/4/2019       RE: Sara Benjamin  [address]  [place] [place] [address]     Dear Colleague,    Thank you for referring your patient, Sara Benjamin, to the [facility] DERMATOLOGY at [facility]. Please see a copy of my visit note below.    [facility] Dermatology Note      Dermatology Problem List:  1. Extensive large condyloma acuminata with concern for malignant transformation diffusely distributed in the groin and genital area, in context of HIV (VL 42; CD4 >1600; on antiretrovirals)   - multiple shave biopsies performed 3/4/19 on large lesions   - imiquimod started 3/4/19   - obtaining outside records as patient notes prior biopsies but unsure of results      Encounter Date: Mar 4, 2019    CC:  Chief Complaint   Patient presents with     Derm Problem     Sara is here today for a follow up visit          History of Present Illness:  Mr. Sara Benjamin is a 48 year old HIV positive man who presents as a referral from Dr. Amor for genital warts. He states that he first noticed the warts 18 years ago. He has been noticing more of the warts over time. They are mostly located around his penis and anus with one around his left ear. They are occasionally itchy and have occasionally bled. He has previously seen a provider in [place] regarding these and had some treated with liquid nitrogen and had one surgically excised around his anus. He believes he had a few of them biopsied but does not have the results. He has not tried any OTC medications for the warts. He continues to take antivirals for the HIV and follows with ID regularly. Most recent labs include a viral load of 42 and CD4 count of 1630 in 12/2018. Pt voices no other concerns today.     Past Medical History:   Patient Active Problem List   Diagnosis     Condyloma acuminata     History reviewed. No pertinent past medical history.  History reviewed. No pertinent surgical history.    Social History:  Patient  reports that he has been smoking.  he has never used smokeless tobacco.    Family History:  History reviewed. No pertinent family history.    Medications:  Current Outpatient Medications   Medication Sig Dispense Refill     bictegravir-emtricitabine-tenofovir (BIKTARVY) -25 MG per tablet Take 1 tablet by mouth daily 30 tablet 11     imiquimod (ALDARA) 5 % external cream Apply topically three times a week 24 packet 3     dolutegravir (TIVICAY) 50 MG tablet Take 1 tablet (50 mg) by mouth daily 30 tablet 0     emtricitabine-tenofovir AF (DESCOVY) 200-25 MG per tablet Take 1 tablet by mouth daily 30 tablet 0     No Known Allergies      Review of Systems:  -As per HPI  -Constitutional: Otherwise feeling well today, in usual state of health.  -HEENT: Patient denies nonhealing oral sores.  -Skin: As above in HPI. No additional skin concerns.    Physical exam:  Vitals: /78 (BP Location: Right arm, Patient Position: Sitting, Cuff Size: Adult Regular)   Pulse 71   GEN: This is a well developed, well-nourished male in no acute distress, in a pleasant mood.    SKIN: Focused examination of the face and genitalia was performed.  -large hyperpigmented verrucous papules and plaques located on penis and at base of penis and behind left ear.   -No other lesions of concern on areas examined.     Impression/Plan:  1. Extensive large condyloma acuminata with concern for malignant transformation diffusely distributed in the groin and genital area, in context of HIV (VL 42; CD4 >1600; on antiretrovirals). Via the , we discussed this concern in great detail with the patient. We will obtain outside biopsy reports, but regardless of the prior results, there exists concern based on today's examination. We also consider epidermodysplasia verruciformis as a consideration, but favor the former.     Shave biopsy x4 (performed by faculty): After discussion of benefits and risks including but not limited to bleeding,  infection, scar, incomplete removal, recurrence, and non-diagnostic biopsy, written consent and photographs were obtained. Time-out was performed. The area was cleaned with isopropyl alcohol. 15 mL of 1% lidocaine was injected to obtain adequate anesthesia of the lesions on the right inquinal fold, right base of the penis proximal, right base of the penis distal and left shaft of the penis. A shave biopsy was performed. Hemostasis was achieved with aluminium chloride. Vaseline and a sterile dressing were applied. The patient tolerated the procedure and no complications were noted. The patient was provided with verbal and written post care instructions.     Start imiquimod 5% cream three times weekly    CC Bartolo Amor MD  [address] [phone] on close of this encounter.  Follow-up in 1 months, earlier for new or changing lesions.     Staff Involved:  I, Levar Phoenix MS4, saw and examined the patient in the presence of Dr. Stanley.    Staff Physician:  I was present with the medical student who participated in the service and in the documentation of the note. I have verified the history and personally performed the physical exam and medical decision making. I edited the assessment and plan of care as documented in the note.     The procedure(s) was(were) performed by myself.    Guillermo Stanley MD   of Dermatology  Department of Dermatology  [facility] [employer]

## 2019-03-07 LAB — COPATH REPORT: NORMAL

## 2019-03-18 ENCOUNTER — HOSPITAL ENCOUNTER (EMERGENCY)
Facility: CLINIC | Age: 49
Discharge: HOME | End: 2019-03-18
Attending: EMERGENCY MEDICINE | Admitting: EMERGENCY MEDICINE
Payer: COMMERCIAL

## 2019-03-18 ENCOUNTER — APPOINTMENT (OUTPATIENT)
Dept: CT IMAGING | Facility: CLINIC | Age: 49
End: 2019-03-18
Attending: EMERGENCY MEDICINE
Payer: COMMERCIAL

## 2019-03-18 VITALS
TEMPERATURE: 97.6 F | BODY MASS INDEX: 22.07 KG/M2 | DIASTOLIC BLOOD PRESSURE: 75 MMHG | HEIGHT: 69 IN | HEART RATE: 81 BPM | SYSTOLIC BLOOD PRESSURE: 125 MMHG | OXYGEN SATURATION: 95 % | WEIGHT: 149 LBS | RESPIRATION RATE: 16 BRPM

## 2019-03-18 DIAGNOSIS — R10.33 PERIUMBILICAL ABDOMINAL PAIN: ICD-10-CM

## 2019-03-18 DIAGNOSIS — K57.32 DIVERTICULITIS OF COLON: ICD-10-CM

## 2019-03-18 LAB
ALBUMIN SERPL-MCNC: 3.8 G/DL (ref 3.4–5)
ALBUMIN UR-MCNC: NEGATIVE MG/DL
ALP SERPL-CCNC: 60 U/L (ref 40–150)
ALT SERPL W P-5'-P-CCNC: 16 U/L (ref 0–70)
ANION GAP SERPL CALCULATED.3IONS-SCNC: 8 MMOL/L (ref 3–14)
APPEARANCE UR: CLEAR
AST SERPL W P-5'-P-CCNC: 13 U/L (ref 0–45)
BASOPHILS # BLD AUTO: 0.1 10E9/L (ref 0–0.2)
BASOPHILS NFR BLD AUTO: 0.8 %
BILIRUB SERPL-MCNC: 0.4 MG/DL (ref 0.2–1.3)
BILIRUB UR QL STRIP: NEGATIVE
BUN SERPL-MCNC: 16 MG/DL (ref 7–30)
CALCIUM SERPL-MCNC: 9.2 MG/DL (ref 8.5–10.1)
CHLORIDE SERPL-SCNC: 111 MMOL/L (ref 94–109)
CO2 SERPL-SCNC: 23 MMOL/L (ref 20–32)
COLOR UR AUTO: YELLOW
CREAT SERPL-MCNC: 0.85 MG/DL (ref 0.66–1.25)
DIFFERENTIAL METHOD BLD: NORMAL
EOSINOPHIL # BLD AUTO: 0.1 10E9/L (ref 0–0.7)
EOSINOPHIL NFR BLD AUTO: 1.5 %
ERYTHROCYTE [DISTWIDTH] IN BLOOD BY AUTOMATED COUNT: 12.4 % (ref 10–15)
GFR SERPL CREATININE-BSD FRML MDRD: >90 ML/MIN/{1.73_M2}
GLUCOSE SERPL-MCNC: 119 MG/DL (ref 70–99)
GLUCOSE UR STRIP-MCNC: NEGATIVE MG/DL
HCT VFR BLD AUTO: 45.1 % (ref 40–53)
HGB BLD-MCNC: 15.6 G/DL (ref 13.3–17.7)
HGB UR QL STRIP: NEGATIVE
IMM GRANULOCYTES # BLD: 0 10E9/L (ref 0–0.4)
IMM GRANULOCYTES NFR BLD: 0.2 %
KETONES UR STRIP-MCNC: NEGATIVE MG/DL
LACTATE BLD-SCNC: 1.7 MMOL/L (ref 0.7–2)
LEUKOCYTE ESTERASE UR QL STRIP: NEGATIVE
LIPASE SERPL-CCNC: 509 U/L (ref 73–393)
LYMPHOCYTES # BLD AUTO: 2.8 10E9/L (ref 0.8–5.3)
LYMPHOCYTES NFR BLD AUTO: 45.6 %
MCH RBC QN AUTO: 32.4 PG (ref 26.5–33)
MCHC RBC AUTO-ENTMCNC: 34.6 G/DL (ref 31.5–36.5)
MCV RBC AUTO: 94 FL (ref 78–100)
MONOCYTES # BLD AUTO: 0.4 10E9/L (ref 0–1.3)
MONOCYTES NFR BLD AUTO: 6.1 %
MUCOUS THREADS #/AREA URNS LPF: PRESENT /LPF
NEUTROPHILS # BLD AUTO: 2.8 10E9/L (ref 1.6–8.3)
NEUTROPHILS NFR BLD AUTO: 45.8 %
NITRATE UR QL: NEGATIVE
NRBC # BLD AUTO: 0 10*3/UL
NRBC BLD AUTO-RTO: 0 /100
PH UR STRIP: 5.5 PH (ref 5–7)
PLATELET # BLD AUTO: 223 10E9/L (ref 150–450)
POTASSIUM SERPL-SCNC: 4 MMOL/L (ref 3.4–5.3)
PROT SERPL-MCNC: 6.9 G/DL (ref 6.8–8.8)
RBC # BLD AUTO: 4.82 10E12/L (ref 4.4–5.9)
RBC #/AREA URNS AUTO: <1 /HPF (ref 0–2)
SODIUM SERPL-SCNC: 143 MMOL/L (ref 133–144)
SOURCE: ABNORMAL
SP GR UR STRIP: 1.02 (ref 1–1.03)
UROBILINOGEN UR STRIP-MCNC: NORMAL MG/DL (ref 0–2)
WBC # BLD AUTO: 6.1 10E9/L (ref 4–11)
WBC #/AREA URNS AUTO: <1 /HPF (ref 0–5)

## 2019-03-18 PROCEDURE — 83690 ASSAY OF LIPASE: CPT | Performed by: EMERGENCY MEDICINE

## 2019-03-18 PROCEDURE — 81001 URINALYSIS AUTO W/SCOPE: CPT | Performed by: EMERGENCY MEDICINE

## 2019-03-18 PROCEDURE — 83605 ASSAY OF LACTIC ACID: CPT | Performed by: EMERGENCY MEDICINE

## 2019-03-18 PROCEDURE — 74177 CT ABD & PELVIS W/CONTRAST: CPT

## 2019-03-18 PROCEDURE — 25000128 H RX IP 250 OP 636: Performed by: EMERGENCY MEDICINE

## 2019-03-18 PROCEDURE — 85025 COMPLETE CBC W/AUTO DIFF WBC: CPT | Performed by: EMERGENCY MEDICINE

## 2019-03-18 PROCEDURE — 99284 EMERGENCY DEPT VISIT MOD MDM: CPT | Mod: Z6 | Performed by: EMERGENCY MEDICINE

## 2019-03-18 PROCEDURE — 25000132 ZZH RX MED GY IP 250 OP 250 PS 637: Performed by: EMERGENCY MEDICINE

## 2019-03-18 PROCEDURE — 80053 COMPREHEN METABOLIC PANEL: CPT | Performed by: EMERGENCY MEDICINE

## 2019-03-18 PROCEDURE — 99284 EMERGENCY DEPT VISIT MOD MDM: CPT | Mod: 25 | Performed by: EMERGENCY MEDICINE

## 2019-03-18 RX ORDER — IOPAMIDOL 755 MG/ML
92 INJECTION, SOLUTION INTRAVASCULAR ONCE
Status: COMPLETED | OUTPATIENT
Start: 2019-03-18 | End: 2019-03-18

## 2019-03-18 RX ORDER — METRONIDAZOLE 500 MG/1
500 TABLET ORAL EVERY 8 HOURS
Qty: 21 TABLET | Refills: 0 | Status: SHIPPED | OUTPATIENT
Start: 2019-03-18 | End: 2019-04-12

## 2019-03-18 RX ORDER — CIPROFLOXACIN 500 MG/1
500 TABLET, FILM COATED ORAL ONCE
Status: COMPLETED | OUTPATIENT
Start: 2019-03-18 | End: 2019-03-18

## 2019-03-18 RX ORDER — METRONIDAZOLE 500 MG/1
500 TABLET ORAL ONCE
Status: COMPLETED | OUTPATIENT
Start: 2019-03-18 | End: 2019-03-18

## 2019-03-18 RX ORDER — SENNA AND DOCUSATE SODIUM 50; 8.6 MG/1; MG/1
1 TABLET, FILM COATED ORAL AT BEDTIME
Qty: 20 TABLET | Refills: 0 | Status: SHIPPED | OUTPATIENT
Start: 2019-03-18 | End: 2019-04-29

## 2019-03-18 RX ORDER — CIPROFLOXACIN 500 MG/1
500 TABLET, FILM COATED ORAL EVERY 12 HOURS
Qty: 14 TABLET | Refills: 0 | Status: SHIPPED | OUTPATIENT
Start: 2019-03-18 | End: 2019-04-12

## 2019-03-18 RX ADMIN — CIPROFLOXACIN HYDROCHLORIDE 500 MG: 500 TABLET, FILM COATED ORAL at 17:09

## 2019-03-18 RX ADMIN — IOPAMIDOL 92 ML: 755 INJECTION, SOLUTION INTRAVENOUS at 16:12

## 2019-03-18 RX ADMIN — METRONIDAZOLE 500 MG: 500 TABLET ORAL at 17:09

## 2019-03-18 ASSESSMENT — ENCOUNTER SYMPTOMS
NAUSEA: 1
ABDOMINAL DISTENTION: 0
ABDOMINAL PAIN: 1
UNEXPECTED WEIGHT CHANGE: 0
DIARRHEA: 0
BACK PAIN: 1
SHORTNESS OF BREATH: 0
RECTAL PAIN: 0
DYSURIA: 0
COUGH: 0
HEADACHES: 0
CONSTIPATION: 1
VOMITING: 0
FLANK PAIN: 0
FEVER: 0
BLOOD IN STOOL: 0
DIFFICULTY URINATING: 0

## 2019-03-18 ASSESSMENT — MIFFLIN-ST. JEOR: SCORE: 1531.24

## 2019-03-18 NOTE — DISCHARGE INSTRUCTIONS
Please make an appointment to follow up with Your Primary Care Provider, [facility] (phone: [phone], [facility] (phone: [phone]), [facility] (phone: [phone]), Infectious Disease Clinic (phone: [phone]) or [facility] [place] [phone] in 3-4 days.    Take ciprofloxacin and flagyl, as prescribed for treatment of diverticulitis.     Take senna-docusate daily, for constipation.    Return to the ED for worsening pain, recurrent vomiting or diarrhea, blood in your stool, dehydration, fever, or abdominal distention and inability to pass gas from below.

## 2019-03-18 NOTE — ED TRIAGE NOTES
"Sara is brought in by a friend who speaks Tigrina. This writer was able to obtain a Tigrina  on the IPad. Via interpretation the patient tells me: \"I have gastritis for the last 12 hours. I can't even lie down to sleep. It hurts all over my abdomen. Sometimes I do have constipation but not right now. I am only on one medication for HIV; biktarvy once a day.\" The friend who brought him here is Ada [phone].  "

## 2019-03-18 NOTE — ED PROVIDER NOTES
[place] EMERGENCY DEPARTMENT ([facility])  3/18/19 Hallway X  3:26 PM   History     Chief Complaint   Patient presents with     Abdominal Pain     The history is provided by the patient and medical records. The history is limited by a language barrier. A  was used ([employer]  followed by professional ).     Sara Benjamin is a 48 year old Tigrinya speaking male with history of well-controlled HIV on Biktarvy ARV (last absolute CD4 count 1630 on 12/17/18) as well as longstanding history of diffuse condyloma acuminata (being followed by derm with biopsies taken on 3/4/19 due to c/f malignant transformation) who presents with intermittent episodes of abdominal pain that started at 1:30 AM today. The pain is severe. waxing and waning in nature, crampy. He states that 45 minutes ago, around 3pm the pain was so severe he had to bend forward.  He has had waxing and waning mid back pain with this. He couldn't walk or lay down due to pain. Pain is presently resolved. He has never had abdominal pain like this in the past. He denies nausea or vomiting. He has tried to induce vomiting without success. Denies diarrhea.  He has had episodes of constipation in the past that comes and goes. He last stooled yesterday morning. No bloody, black or tarry stools. Denies dysuria testicular or penile pain or drainage. Denies concern for STI as he has not been sexually active in a long time. No unexpected weight loss. No history of abdominal surgeries.     Patient is followed by Infectious Disease clinic. He is taking all of his medications, denies forgetting any medications. He has been in the [place] for the past 4 years but moved from [place], [place] to [place] 3 months ago.      I have reviewed the Medications, Allergies, Past Medical and Surgical History, and Social History in the [facility] system.    PAST MEDICAL HISTORY:   Past Medical History:   Diagnosis Date     Condyloma acuminata       HIV (human immunodeficiency virus infection) (H)     on Biktarvy       PAST SURGICAL HISTORY: No past surgical history on file.    FAMILY HISTORY: No family history on file.    SOCIAL HISTORY:   Social History     Tobacco Use     Smoking status: Light Tobacco Smoker     Smokeless tobacco: Never Used   Substance Use Topics     Alcohol use: Not on file       Discharge Medication List as of 3/18/2019  5:21 PM      START taking these medications    Details   ciprofloxacin (CIPRO) 500 MG tablet Take 1 tablet (500 mg) by mouth every 12 hours for 7 days, Disp-14 tablet, R-0, Local Print      metroNIDAZOLE (FLAGYL) 500 MG tablet Take 1 tablet (500 mg) by mouth every 8 hours for 7 days, Disp-21 tablet, R-0, Local PrintEat yogurt or cottage cheese daily to prevent diarrhea that can be caused by taking this medication.      SENNA-docusate sodium (SENNA S) 8.6-50 MG tablet Take 1 tablet by mouth At Bedtime, Disp-20 tablet, R-0, Local Print         CONTINUE these medications which have NOT CHANGED    Details   bictegravir-emtricitabine-tenofovir (BIKTARVY) -25 MG per tablet Take 1 tablet by mouth daily, Disp-30 tablet, R-11, E-Prescribe      imiquimod (ALDARA) 5 % external cream Apply topically three times a weekDisp-24 packet, [id number]              No Known Allergies     Review of Systems   Constitutional: Negative for fever and unexpected weight change.   Respiratory: Negative for cough and shortness of breath.    Cardiovascular: Negative for chest pain.   Gastrointestinal: Positive for abdominal pain, constipation (occasional baseline) and nausea. Negative for abdominal distention, blood in stool, diarrhea, rectal pain and vomiting.   Genitourinary: Positive for genital sores. Negative for difficulty urinating, discharge, dysuria, flank pain, penile pain, penile swelling, scrotal swelling and testicular pain.   Musculoskeletal: Positive for back pain.   Skin: Positive for rash (Diffuse verrucous lesions,  "chronic).   Allergic/Immunologic: Positive for immunocompromised state.   Neurological: Negative for headaches.   All other systems reviewed and are negative.      Physical Exam   BP: 129/80  Pulse: 81  Temp: 97.6  F (36.4  C)  Resp: 16  Height: 175.3 cm (5' 9\")  Weight: 67.6 kg (149 lb)  SpO2: 95 %      Physical Exam   Constitutional: He is oriented to person, place, and time. He appears well-developed and well-nourished. No distress.   HENT:   Head: Normocephalic and atraumatic.   Nose: Nose normal.   Mouth/Throat: Oropharynx is clear and moist.   Eyes: Conjunctivae are normal. No scleral icterus.   Neck: Normal range of motion. Neck supple.   Cardiovascular: Normal rate, regular rhythm and normal heart sounds.   Pulmonary/Chest: Effort normal and breath sounds normal. No stridor. No respiratory distress. He has no wheezes. He has no rales.   Abdominal: Soft. He exhibits distension (very mild). Bowel sounds are increased. There is tenderness in the periumbilical area. There is no rigidity, no rebound, no guarding, no CVA tenderness, no tenderness at McBurney's point and negative Benjamin's sign. No hernia. Hernia confirmed negative in the ventral area, confirmed negative in the right inguinal area and confirmed negative in the left inguinal area.   Genitourinary: Testes normal. Right testis shows no swelling and no tenderness. Left testis shows no swelling and no tenderness. No penile erythema or penile tenderness. No discharge found.   Genitourinary Comments: Numerous large verrucous lesions on penis, scrotum, and mons pubis    Musculoskeletal: Normal range of motion. He exhibits no deformity.   Lymphadenopathy: No inguinal adenopathy noted on the right or left side.   Neurological: He is alert and oriented to person, place, and time. He exhibits normal muscle tone.   Skin: Skin is warm, dry and intact. No petechiae and no purpura noted. Rash is not vesicular. He is not diaphoretic. No erythema.   Numerous dark " brown verrucous lesions on genitalia, left shoulder, and behind left ear   Psychiatric: He has a normal mood and affect. His behavior is normal. Thought content normal.   Nursing note and vitals reviewed.      ED Course        Procedures             Critical Care time:  none             Labs Ordered and Resulted from Time of ED Arrival Up to the Time of Departure from the ED   COMPREHENSIVE METABOLIC PANEL - Abnormal; Notable for the following components:       Result Value    Chloride 111 (*)     Glucose 119 (*)     All other components within normal limits   LIPASE - Abnormal; Notable for the following components:    Lipase 509 (*)     All other components within normal limits   ROUTINE UA WITH MICROSCOPIC REFLEX TO CULTURE - Abnormal; Notable for the following components:    Mucous Urine Present (*)     All other components within normal limits   CBC WITH PLATELETS DIFFERENTIAL   LACTIC ACID WHOLE BLOOD   PERIPHERAL IV CATHETER     Abd/pelvis CT,  IV  contrast only TRAUMA / AAA   Final Result   IMPRESSION:    1. Pancolonic diverticulosis with thickening of the rectosigmoid   colon; small amount of free fluid in the deep pelvis. Finding could   represent a mild diverticulitis in the appropriate clinical setting.   2. No genitourinary stone or bowel obstruction as questioned.   3. Abnormal skin thickening and enhancement involving the anterior   abdominopelvic wall and penile shaft. This facility corresponds to HPV   infection documented in the electronic medical record.      I have personally reviewed the examination and initial interpretation   and I agree with the findings.      SHAZIA ROMAN MD               Assessments & Plan (with Medical Decision Making)   Sara Benjamin is a 48 year old Tigrinya speaking male with history of well-controlled HIV on Biktarvy ARV (last absolute CD4 count 1630 on 12/17/18) as well as longstanding history of diffuse condyloma acuminata (being followed by derm with biopsies taken  on 3/4/19 due to c/f malignant transformation) who presents with intermittent episodes of abdominal pain that started at 1:30 AM today.     Ddx: sbo, renal colic/urolithiasis, pancreatitis, cholecystitis/biliary cholic, PUD, gastritis      Patient's pain mostly resolved at time of evaluation. CT scan obtained due to immunocompromised status and severity of pain. UA wnl. CT shows pancolonic diverticulosis and inflammation that may represent diverticulitis. No evidence of pancreatic or biliary abnormalities on CT. Labs wnl with very mildly elevated lipase. Will treat for diverticulitis with cipro and flagyl. Patient's pain is still resolved. Safe for outpatient treatment with close follow up. Recommend f/u in ID or establish care with a PCP. Given contact info for clinics. Also given rx senna-docusate for chronic constipation. Patient verbalized understanding. Return precautions provided.      I have reviewed the nursing notes.    I have reviewed the findings, diagnosis, plan and need for follow up with the patient.       Medication List      Started    ciprofloxacin 500 MG tablet  Commonly known as:  CIPRO  500 mg, Oral, EVERY 12 HOURS     metroNIDAZOLE 500 MG tablet  Commonly known as:  FLAGYL  500 mg, Oral, EVERY 8 HOURS     SENNA-docusate sodium 8.6-50 MG tablet  Commonly known as:  SENNA S  1 tablet, Oral, AT BEDTIME            Final diagnoses:   Periumbilical abdominal pain   Diverticulitis of colon       3/18/2019   [facility], [place], EMERGENCY DEPARTMENT     Loraine Zheng MD  03/22/19 [id number]

## 2019-03-18 NOTE — ED AVS SNAPSHOT
[facility], [place], Emergency Department  [address] [address]  Phone:  [phone]                                    Sara Benjamin   MRN: [id number]    Department:  [facility], Emergency Department   Date of Visit:  3/18/2019           After Visit Summary Signature Page    I have received my discharge instructions, and my questions have been answered. I have discussed any challenges I see with this plan with the nurse or doctor.    ..........................................................................................................................................  Patient/Patient Representative Signature      ..........................................................................................................................................  Patient Representative Print Name and Relationship to Patient    ..................................................               ................................................  Date                                   Time    ..........................................................................................................................................  Reviewed by Signature/Title    ...................................................              ..............................................  Date                                               Time          22EPIC Rev 08/18

## 2019-04-05 ENCOUNTER — OFFICE VISIT (OUTPATIENT)
Dept: DERMATOLOGY | Facility: CLINIC | Age: 49
End: 2019-04-05
Payer: COMMERCIAL

## 2019-04-05 VITALS — HEART RATE: 52 BPM | DIASTOLIC BLOOD PRESSURE: 70 MMHG | SYSTOLIC BLOOD PRESSURE: 109 MMHG

## 2019-04-05 DIAGNOSIS — A63.0 CONDYLOMA ACUMINATA: ICD-10-CM

## 2019-04-05 DIAGNOSIS — D48.5 NEOPLASM OF UNCERTAIN BEHAVIOR OF SKIN: Primary | ICD-10-CM

## 2019-04-05 RX ORDER — LIDOCAINE HYDROCHLORIDE AND EPINEPHRINE 10; 10 MG/ML; UG/ML
3 INJECTION, SOLUTION INFILTRATION; PERINEURAL ONCE
Status: ACTIVE | OUTPATIENT
Start: 2019-04-05

## 2019-04-05 RX ORDER — IMIQUIMOD 12.5 MG/.25G
CREAM TOPICAL
Qty: 48 PACKET | Refills: 3 | Status: SHIPPED | OUTPATIENT
Start: 2019-04-05 | End: 2019-08-12

## 2019-04-05 ASSESSMENT — PAIN SCALES - GENERAL
PAINLEVEL: NO PAIN (0)
PAINLEVEL: NO PAIN (0)

## 2019-04-05 NOTE — PROGRESS NOTES
"[facility] Dermatology Note      Dermatology Problem List:  1. Extensive large condyloma acuminata with concern for malignant transformation diffusely distributed in the groin and genital area, in context of HIV (VL 42; CD4 >1600; on antiretrovirals)              - multiple shave biopsies performed 3/4/19 (no SCCis identified), 4/5/19 on largest lesions              - imiquimod started 3/4/19   - plan for cryotherapy of smaller lesions starting at next visit in 5/2019    Encounter Date: Apr 5, 2019    CC:  Chief Complaint   Patient presents with     Derm Problem     Sara is here today for an one month follow up for genital warts. He says the cream is not really working.         History of Present Illness:  Mr. Sara Benjamin is a 48 year old male who presents as a follow-up for condyloma. The patient was last seen 3/4/19 when a shave biopsy was performed and he started imiquimod three times weekly. Today, the patient reports that he obtained the topical imiquimod, but he ran out and it did not provide much relief. He states the pharmacist told him to apply a full packet to a single lesion one time only. He has not picked up any of the refills, and after applying the cream as instructed by the pharmacist, he ran out. His warts continue to persist without much relief. He states that in [place], he had some of his warts \"burned off\" and he inquires as to whether this could be pursued as a treatment option. The patient voices no other concerns.     Past Medical History:   Patient Active Problem List   Diagnosis     Condyloma acuminata     Past Medical History:   Diagnosis Date     Condyloma acuminata      HIV (human immunodeficiency virus infection) (H)     on Biktarvy     No past surgical history on file.    Social History:  Patient reports that he has been smoking.  he has never used smokeless tobacco.    Family History:  No family history on file.    Medications:  Current Outpatient " Medications   Medication Sig Dispense Refill     bictegravir-emtricitabine-tenofovir (BIKTARVY) -25 MG per tablet Take 1 tablet by mouth daily 30 tablet 11     imiquimod (ALDARA) 5 % external cream Apply topically three times a week 24 packet 3     SENNA-docusate sodium (SENNA S) 8.6-50 MG tablet Take 1 tablet by mouth At Bedtime 20 tablet 0       No Known Allergies    Review of Systems:  -Constitutional: Otherwise feeling well today, in usual state of health.  -HEENT: Patient denies nonhealing oral sores.  -Skin: As above in HPI. No additional skin concerns.    Physical exam:  Vitals: /70 (BP Location: Right arm, Patient Position: Sitting, Cuff Size: Adult Regular)   Pulse 52   GEN: This is a well developed, well-nourished male in no acute distress, in a pleasant mood.    SKIN: Granger phototype: IV   -Focused examination of the face and genetalia was performed.  -large brown verrucous papules and plaques on the penis, scrotum, inferior abdomen, and behind the left ear  -No other lesions of concern on areas examined.       Impression/Plan:  1. Extensive and very large condyloma acuminata diffusely distributed on the groin and genital area in the context of HIV (VL 42; CD4 >1600; on antiretrovirals).     Discussed the proper method of imiquimod application. Also discussed the risks and benefits of removal of some of the larger lesions followed by application of topical imiquimod in these areas. Discussed that due to number and size of warts, we cannot remove all simultaneously. Furthermore, advised against electrofulguration/electrodesiccation/laser therapy as active viral particles may be released in the smoke plume.    Shave biopsy x3: After discussion of benefits and risks including but not limited to bleeding, infection, scar, incomplete removal, recurrence, and non-diagnostic biopsy, written consent and photographs were obtained. Time-out was performed. The area was cleaned with isopropyl  alcohol. 15 mL of 1% lidocaine was injected to obtain adequate anesthesia of the lesions on the L thigh, ventral shaft of penis, and inferior abdomen medial. A shave biopsy was performed. Hemostasis was achieved with aluminium chloride. Vaseline and a sterile dressing were applied. The patient tolerated the procedure and no complications were noted. The patient was provided with verbal and written post care instructions.     Continue imiquimod 5% cream three times weekly.       Follow-up in 6 weeks, earlier for new or changing lesions.       Staff Involved:  Scribe/Staff    Scribe Disclosure  I, Dominic Najjar, am serving as a scribe to document services personally performed by Dr. Guillermo Stanley MD, based on data collection and the provider's statements to me.    Provider Disclosure:   The documentation recorded by the scribe accurately reflects the services I personally performed and the decisions made by me.    Guillermo Stanley MD   of Dermatology  Department of Dermatology  [facility]

## 2019-04-05 NOTE — PATIENT INSTRUCTIONS

## 2019-04-05 NOTE — NURSING NOTE
Dermatology Rooming Note    Sara Benjamin's goals for this visit include:   Chief Complaint   Patient presents with     Derm Problem     Sara is here today for an one month follow up for genital warts. He says the cream is not really working.     Nathan Cheatham, [facility]

## 2019-04-05 NOTE — LETTER
"4/5/2019       RE: Sara Benjamin  [address]  [place] [facility] [address]     Dear Colleague,    Thank you for referring your patient, Sara Benjamin, to the [facility] DERMATOLOGY at [facility]. Please see a copy of my visit note below.    [facility] Dermatology Note      Dermatology Problem List:  1. Extensive large condyloma acuminata with concern for malignant transformation diffusely distributed in the groin and genital area, in context of HIV (VL 42; CD4 >1600; on antiretrovirals)              - multiple shave biopsies performed 3/4/19 (no SCCis identified), 4/5/19 on largest lesions              - imiquimod started 3/4/19   - plan for cryotherapy of smaller lesions starting at next visit in 5/2019    Encounter Date: Apr 5, 2019    CC:  Chief Complaint   Patient presents with     Derm Problem     Sara is here today for an one month follow up for genital warts. He says the cream is not really working.         History of Present Illness:  Mr. Sara Benjamin is a 48 year old male who presents as a follow-up for condyloma. The patient was last seen 3/4/19 when a shave biopsy was performed and he started imiquimod three times weekly. Today, the patient reports that he obtained the topical imiquimod, but he ran out and it did not provide much relief. He states the pharmacist told him to apply a full packet to a single lesion one time only. He has not picked up any of the refills, and after applying the cream as instructed by the pharmacist, he ran out. His warts continue to persist without much relief. He states that in [place], he had some of his warts \"burned off\" and he inquires as to whether this could be pursued as a treatment option. The patient voices no other concerns.     Past Medical History:   Patient Active Problem List   Diagnosis     Condyloma acuminata     Past Medical History:   Diagnosis Date     Condyloma acuminata      HIV (human " immunodeficiency virus infection) (H)     on Biktarvy     No past surgical history on file.    Social History:  Patient reports that he has been smoking.  he has never used smokeless tobacco.    Family History:  No family history on file.    Medications:  Current Outpatient Medications   Medication Sig Dispense Refill     bictegravir-emtricitabine-tenofovir (BIKTARVY) -25 MG per tablet Take 1 tablet by mouth daily 30 tablet 11     imiquimod (ALDARA) 5 % external cream Apply topically three times a week 24 packet 3     SENNA-docusate sodium (SENNA S) 8.6-50 MG tablet Take 1 tablet by mouth At Bedtime 20 tablet 0       No Known Allergies    Review of Systems:  -Constitutional: Otherwise feeling well today, in usual state of health.  -HEENT: Patient denies nonhealing oral sores.  -Skin: As above in HPI. No additional skin concerns.    Physical exam:  Vitals: /70 (BP Location: Right arm, Patient Position: Sitting, Cuff Size: Adult Regular)   Pulse 52   GEN: This is a well developed, well-nourished male in no acute distress, in a pleasant mood.    SKIN: Granger phototype: IV   -Focused examination of the face and genetalia was performed.  -large brown verrucous papules and plaques on the penis, scrotum, inferior abdomen, and behind the left ear  -No other lesions of concern on areas examined.       Impression/Plan:  1. Extensive and very large condyloma acuminata diffusely distributed on the groin and genital area in the context of HIV (VL 42; CD4 >1600; on antiretrovirals).     Discussed the proper method of imiquimod application. Also discussed the risks and benefits of removal of some of the larger lesions followed by application of topical imiquimod in these areas. Discussed that due to number and size of warts, we cannot remove all simultaneously. Furthermore, advised against electrofulguration/electrodesiccation/laser therapy as active viral particles may be released in the smoke plume.    Shave  biopsy x 3: After discussion of benefits and risks including but not limited to bleeding, infection, scar, incomplete removal, recurrence, and non-diagnostic biopsy, written consent and photographs were obtained. Time-out was performed. The area was cleaned with isopropyl alcohol. 15 mL of 1% lidocaine was injected to obtain adequate anesthesia of the lesions on the L thigh, ventral shaft of penis, and inferior abdomen medial. A shave biopsy was performed. Hemostasis was achieved with aluminium chloride. Vaseline and a sterile dressing were applied. The patient tolerated the procedure and no complications were noted. The patient was provided with verbal and written post care instructions.     Continue imiquimod 5% cream three times weekly.       Follow-up in 6 weeks, earlier for new or changing lesions.       Staff Involved:  Scribe/Staff    Scribe Disclosure  I, Dominic Najjar, am serving as a scribe to document services personally performed by Dr. Guillermo Stanley MD, based on data collection and the provider's statements to me.    Provider Disclosure:   The documentation recorded by the scribe accurately reflects the services I personally performed and the decisions made by me.    Guillermo Stanley MD   of Dermatology  Department of Dermatology  [facility] [employer]           Again, thank you for allowing me to participate in the care of your patient.      Sincerely,    Guillermo Stanley MD

## 2019-04-05 NOTE — NURSING NOTE
Lidocaine-epinephrine 1-1:[id number] % injection   14mL once for one use, starting 4/5/2019 ending 4/5/2019,  2mL disp, R-0, injection  Injected by Dr. Stanley

## 2019-04-05 NOTE — LETTER
Date:April 9, 2019      Patient was self referred, no letter generated. Do not send.        [facility] Information

## 2019-04-11 ENCOUNTER — TELEPHONE (OUTPATIENT)
Dept: INFECTIOUS DISEASES | Facility: CLINIC | Age: 49
End: 2019-04-11

## 2019-04-11 LAB — COPATH REPORT: NORMAL

## 2019-04-11 NOTE — TELEPHONE ENCOUNTER
Left message for pt reminding them of upcoming appointment.  Instructed pt to bring updated medications list.  Rob Morse MA

## 2019-04-12 ENCOUNTER — OFFICE VISIT (OUTPATIENT)
Dept: INFECTIOUS DISEASES | Facility: CLINIC | Age: 49
End: 2019-04-12
Attending: INTERNAL MEDICINE
Payer: COMMERCIAL

## 2019-04-12 VITALS
HEIGHT: 69 IN | HEART RATE: 75 BPM | BODY MASS INDEX: 22 KG/M2 | OXYGEN SATURATION: 97 % | SYSTOLIC BLOOD PRESSURE: 106 MMHG | TEMPERATURE: 97.6 F | DIASTOLIC BLOOD PRESSURE: 72 MMHG

## 2019-04-12 DIAGNOSIS — B20 HUMAN IMMUNODEFICIENCY VIRUS (HIV) DISEASE (H): Primary | ICD-10-CM

## 2019-04-12 DIAGNOSIS — Z11.3 SCREEN FOR SEXUALLY TRANSMITTED DISEASES: ICD-10-CM

## 2019-04-12 DIAGNOSIS — Z11.1 SCREENING FOR TUBERCULOSIS: ICD-10-CM

## 2019-04-12 DIAGNOSIS — B20 HUMAN IMMUNODEFICIENCY VIRUS (HIV) DISEASE (H): ICD-10-CM

## 2019-04-12 LAB
ALBUMIN SERPL-MCNC: 3.8 G/DL (ref 3.4–5)
ALBUMIN UR-MCNC: NEGATIVE MG/DL
ALP SERPL-CCNC: 66 U/L (ref 40–150)
ALT SERPL W P-5'-P-CCNC: 20 U/L (ref 0–70)
ANION GAP SERPL CALCULATED.3IONS-SCNC: 7 MMOL/L (ref 3–14)
APPEARANCE UR: CLEAR
AST SERPL W P-5'-P-CCNC: 12 U/L (ref 0–45)
BASOPHILS # BLD AUTO: 0 10E9/L (ref 0–0.2)
BASOPHILS NFR BLD AUTO: 0.7 %
BILIRUB SERPL-MCNC: 0.3 MG/DL (ref 0.2–1.3)
BILIRUB UR QL STRIP: NEGATIVE
BUN SERPL-MCNC: 17 MG/DL (ref 7–30)
CALCIUM SERPL-MCNC: 9 MG/DL (ref 8.5–10.1)
CHLORIDE SERPL-SCNC: 110 MMOL/L (ref 94–109)
CO2 SERPL-SCNC: 24 MMOL/L (ref 20–32)
COLOR UR AUTO: YELLOW
CREAT SERPL-MCNC: 0.8 MG/DL (ref 0.66–1.25)
DIFFERENTIAL METHOD BLD: NORMAL
EOSINOPHIL # BLD AUTO: 0 10E9/L (ref 0–0.7)
EOSINOPHIL NFR BLD AUTO: 0.4 %
ERYTHROCYTE [DISTWIDTH] IN BLOOD BY AUTOMATED COUNT: 12.3 % (ref 10–15)
GFR SERPL CREATININE-BSD FRML MDRD: >90 ML/MIN/{1.73_M2}
GLUCOSE SERPL-MCNC: 80 MG/DL (ref 70–99)
GLUCOSE UR STRIP-MCNC: NEGATIVE MG/DL
HCT VFR BLD AUTO: 41.1 % (ref 40–53)
HGB BLD-MCNC: 14.3 G/DL (ref 13.3–17.7)
HGB UR QL STRIP: NEGATIVE
IMM GRANULOCYTES # BLD: 0 10E9/L (ref 0–0.4)
IMM GRANULOCYTES NFR BLD: 0.2 %
KETONES UR STRIP-MCNC: 5 MG/DL
LEUKOCYTE ESTERASE UR QL STRIP: NEGATIVE
LYMPHOCYTES # BLD AUTO: 2 10E9/L (ref 0.8–5.3)
LYMPHOCYTES NFR BLD AUTO: 37.7 %
MCH RBC QN AUTO: 31.5 PG (ref 26.5–33)
MCHC RBC AUTO-ENTMCNC: 34.8 G/DL (ref 31.5–36.5)
MCV RBC AUTO: 91 FL (ref 78–100)
MONOCYTES # BLD AUTO: 0.5 10E9/L (ref 0–1.3)
MONOCYTES NFR BLD AUTO: 9.5 %
MUCOUS THREADS #/AREA URNS LPF: PRESENT /LPF
NEUTROPHILS # BLD AUTO: 2.8 10E9/L (ref 1.6–8.3)
NEUTROPHILS NFR BLD AUTO: 51.5 %
NITRATE UR QL: NEGATIVE
NRBC # BLD AUTO: 0 10*3/UL
NRBC BLD AUTO-RTO: 0 /100
PH UR STRIP: 6 PH (ref 5–7)
PLATELET # BLD AUTO: 216 10E9/L (ref 150–450)
POTASSIUM SERPL-SCNC: 3.8 MMOL/L (ref 3.4–5.3)
PROT SERPL-MCNC: 7.2 G/DL (ref 6.8–8.8)
RBC # BLD AUTO: 4.54 10E12/L (ref 4.4–5.9)
RBC #/AREA URNS AUTO: <1 /HPF (ref 0–2)
SODIUM SERPL-SCNC: 141 MMOL/L (ref 133–144)
SOURCE: ABNORMAL
SP GR UR STRIP: 1.02 (ref 1–1.03)
UROBILINOGEN UR STRIP-MCNC: 0 MG/DL (ref 0–2)
WBC # BLD AUTO: 5.4 10E9/L (ref 4–11)
WBC #/AREA URNS AUTO: <1 /HPF (ref 0–5)

## 2019-04-12 PROCEDURE — 36415 COLL VENOUS BLD VENIPUNCTURE: CPT | Performed by: INTERNAL MEDICINE

## 2019-04-12 PROCEDURE — 87536 HIV-1 QUANT&REVRSE TRNSCRPJ: CPT | Performed by: INTERNAL MEDICINE

## 2019-04-12 PROCEDURE — 86708 HEPATITIS A ANTIBODY: CPT | Performed by: INTERNAL MEDICINE

## 2019-04-12 PROCEDURE — 0064U ANTB TP TOTAL&RPR IA QUAL: CPT | Performed by: INTERNAL MEDICINE

## 2019-04-12 PROCEDURE — 86644 CMV ANTIBODY: CPT | Performed by: INTERNAL MEDICINE

## 2019-04-12 PROCEDURE — G0463 HOSPITAL OUTPT CLINIC VISIT: HCPCS | Mod: ZF

## 2019-04-12 PROCEDURE — 81381 HLA I TYPING 1 ALLELE HR: CPT | Performed by: INTERNAL MEDICINE

## 2019-04-12 PROCEDURE — 86481 TB AG RESPONSE T-CELL SUSP: CPT | Performed by: INTERNAL MEDICINE

## 2019-04-12 PROCEDURE — 85025 COMPLETE CBC W/AUTO DIFF WBC: CPT | Performed by: INTERNAL MEDICINE

## 2019-04-12 PROCEDURE — 86704 HEP B CORE ANTIBODY TOTAL: CPT | Performed by: INTERNAL MEDICINE

## 2019-04-12 PROCEDURE — 86777 TOXOPLASMA ANTIBODY: CPT | Performed by: INTERNAL MEDICINE

## 2019-04-12 PROCEDURE — 81001 URINALYSIS AUTO W/SCOPE: CPT | Performed by: INTERNAL MEDICINE

## 2019-04-12 PROCEDURE — 80053 COMPREHEN METABOLIC PANEL: CPT | Performed by: INTERNAL MEDICINE

## 2019-04-12 ASSESSMENT — PAIN SCALES - GENERAL: PAINLEVEL: NO PAIN (0)

## 2019-04-12 NOTE — PROGRESS NOTES
Infectious Disease Clinic Note    Sara Benjamin returns today for routine HIV care.  He established care with me 2/4/2019.  He was previously followed in [place].   He is originally from [facility] [place] [facility] [place].    Sara reports daily use of Biktarvy with no concerns or issues.  He was seen in the ED on 3/18 for abdominal pain and was treated for diverticulitis, and also given senna/docusate for constipation.  He is feeling better now after a course of cipro/flagyl and starting the stool regimen.  He is seeing Dermatology for the extensive condyloma acuminata and was given Imiquimod.    PLAN:  - Today we will order routine follow-up labs after this medication switch as well as baseline HIV labs for baseline  -  Sara would like me to help with an immigration form that requests his immunization history.  He signed a BIJU and I will attempt to get records from [place]  - return in 2 weeks to review vaccines and complete this paperwork    Bartolo Amor MD, MS  Infectious Disease    Time:  I spent 25 minutes in direct care with this patient, including >50% of time face-to-face with the patient counseling about HIV terminology and coordination of care.

## 2019-04-12 NOTE — LETTER
4/12/2019      RE: Sara Benjamin  [address]  [facility] [address]           Infectious Disease Clinic Note    Sara Benjamin returns today for routine HIV care.  He established care with me 2/4/2019.  [place] was previously followed in [place].   He is originally from [facility].    Sara reports daily use of Biktarvy with no concerns or issues.  He was seen in the ED on 3/18 for abdominal pain and was treated for diverticulitis, and also given senna/docusate for constipation.  He is feeling better now after a course of cipro/flagyl and starting the stool regimen.  He is seeing Dermatology for the extensive condyloma acuminata and was given Imiquimod.    PLAN:  - Today we will order routine follow-up labs after this medication switch as well as baseline HIV labs for baseline  -  Sara would like me to help with an immigration form that requests his immunization history.  He signed a BIJU and I will attempt to get records from [place]  - return in 2 weeks to review vaccines and complete this paperwork    Bartolo Amor MD, MS  Infectious Disease    Time:  I spent 25 minutes in direct care with this patient, including >50% of time face-to-face with the patient counseling about HIV terminology and coordination of care.    Bartolo Amor MD

## 2019-04-12 NOTE — LETTER
Date:May 7, 2019      Patient was self referred, no letter generated. Do not send.        [facility] Health Information

## 2019-04-13 LAB
CMV IGG SERPL QL IA: >8 AI (ref 0–0.8)
HAV IGG SER QL IA: REACTIVE
HBV CORE AB SERPL QL IA: NONREACTIVE
T PALLIDUM AB SER QL: NONREACTIVE

## 2019-04-15 LAB
GAMMA INTERFERON BACKGROUND BLD IA-ACNC: 0.2 IU/ML
M TB IFN-G BLD-IMP: POSITIVE
M TB IFN-G CD4+ BCKGRND COR BLD-ACNC: >10 IU/ML
MITOGEN IGNF BCKGRD COR BLD-ACNC: 1.79 IU/ML
MITOGEN IGNF BCKGRD COR BLD-ACNC: 1.91 IU/ML
T GONDII IGG SER QL: <3 IU/ML (ref 0–7.1)

## 2019-04-16 LAB
HIV1 RNA # PLAS NAA DL=20: NORMAL {COPIES}/ML
HIV1 RNA SERPL NAA+PROBE-LOG#: NORMAL {LOG_COPIES}/ML

## 2019-04-17 LAB
HLA TYPE SA METHOD: NORMAL
HLA TYPE SA RESULT: NORMAL

## 2019-04-19 ENCOUNTER — TELEPHONE (OUTPATIENT)
Dept: INFECTIOUS DISEASES | Facility: CLINIC | Age: 49
End: 2019-04-19

## 2019-04-19 DIAGNOSIS — B20 HUMAN IMMUNODEFICIENCY VIRUS (HIV) DISEASE (H): ICD-10-CM

## 2019-04-19 NOTE — TELEPHONE ENCOUNTER
[facility]    Phone Message    May a detailed message be left on voicemail: yes    Reason for Call: Medication Refill Request    Has the patient contacted the pharmacy for the refill? Yes   Name of medication being requested: bictegravir-emtricitabine-tenofovir (BIKTARVY) -25 MG per tablet  Provider who prescribed the medication: Marsh  Pharmacy: [facility] ([address] [place] [address]) - [address]  Date medication is needed: ASAP    Fax: [phone]      Action Taken: Message routed to:  [facility] ID

## 2019-04-29 ENCOUNTER — OFFICE VISIT (OUTPATIENT)
Dept: INFECTIOUS DISEASES | Facility: CLINIC | Age: 49
End: 2019-04-29
Attending: INTERNAL MEDICINE
Payer: COMMERCIAL

## 2019-04-29 VITALS
HEART RATE: 69 BPM | TEMPERATURE: 97.8 F | OXYGEN SATURATION: 99 % | SYSTOLIC BLOOD PRESSURE: 107 MMHG | DIASTOLIC BLOOD PRESSURE: 71 MMHG | BODY MASS INDEX: 19.96 KG/M2 | WEIGHT: 134.8 LBS | HEIGHT: 69 IN

## 2019-04-29 DIAGNOSIS — Z78.9 HISTORY OF MEASLES, MUMPS, RUBELLA (MMR) VACCINATION UNKNOWN: ICD-10-CM

## 2019-04-29 DIAGNOSIS — K59.04 CHRONIC IDIOPATHIC CONSTIPATION: ICD-10-CM

## 2019-04-29 DIAGNOSIS — Z23 NEED FOR MENINGOCOCCAL VACCINATION: ICD-10-CM

## 2019-04-29 DIAGNOSIS — B20 HUMAN IMMUNODEFICIENCY VIRUS (HIV) DISEASE (H): Primary | ICD-10-CM

## 2019-04-29 PROCEDURE — 36415 COLL VENOUS BLD VENIPUNCTURE: CPT | Performed by: INTERNAL MEDICINE

## 2019-04-29 PROCEDURE — 86735 MUMPS ANTIBODY: CPT | Performed by: INTERNAL MEDICINE

## 2019-04-29 PROCEDURE — 86762 RUBELLA ANTIBODY: CPT | Performed by: INTERNAL MEDICINE

## 2019-04-29 PROCEDURE — 90734 MENACWYD/MENACWYCRM VACC IM: CPT | Mod: ZF | Performed by: INTERNAL MEDICINE

## 2019-04-29 PROCEDURE — 86765 RUBEOLA ANTIBODY: CPT | Performed by: INTERNAL MEDICINE

## 2019-04-29 PROCEDURE — 25000581 ZZH RX MED A9270 GY (STAT IND- M) 250: Mod: ZF | Performed by: INTERNAL MEDICINE

## 2019-04-29 PROCEDURE — 90471 IMMUNIZATION ADMIN: CPT | Mod: ZF

## 2019-04-29 PROCEDURE — G0463 HOSPITAL OUTPT CLINIC VISIT: HCPCS | Mod: 25,ZF

## 2019-04-29 RX ORDER — SENNA AND DOCUSATE SODIUM 50; 8.6 MG/1; MG/1
1 TABLET, FILM COATED ORAL 2 TIMES DAILY
Qty: 60 TABLET | Refills: 11 | Status: SHIPPED | OUTPATIENT
Start: 2019-04-29 | End: 2022-03-15

## 2019-04-29 RX ADMIN — Medication 0.5 ML: at 17:52

## 2019-04-29 ASSESSMENT — MIFFLIN-ST. JEOR: SCORE: 1466.83

## 2019-04-29 ASSESSMENT — PAIN SCALES - GENERAL: PAINLEVEL: SEVERE PAIN (6)

## 2019-04-29 NOTE — LETTER
4/29/2019      RE: Sara Benjamin  [address]  [facility] [address]           Infectious Disease Clinic Note    Mr. Sara Benjamin returns today for short interval follow-up of his HIV, to review immunization history.    He requested this appointment because he needed a physician to complete an immigration form that required a history and physical, along with immunization history.  Unfortunately, Sara did not think to bring this form with him today.  I attempted to call the center that is working with him on his immigration status, but they were closed for the day at the time of our appointment.   Sara decided that he will come back soon, and bring his form with him the next time.    At our last appointment, Sara signed a BIJU and so I was able to obtain his medial records from the [facility] in [place], [place] where he last received HIV care.   The following is his update vaccination history.  We administered his 2nd Menveo today, and ordered some titers with the goal of having as complete information as possible.    PLAN:    - Return in 1-2 weeks for a short visit to fill out the immigration form.   Sara will need to see one of my colleagues as I will be out on leave for the month of May.       Here is the information for his immunization history, now updated.    Immunization History   Administered Date(s) Administered     HepB-Adult 01/29/2015, 04/09/2015, 07/23/2015, 12/07/2016     HepB-Dialysis 05/12/2016, 07/14/2016     Influenza Vaccine, 3 YRS +, IM (QUADRIVALENT W/PRESERVATIVES) 12/06/2017     Meningococcal (Menveo ) 03/20/2018, 04/29/2019     Pneumo Conj 13-V (2010&after) 04/09/2015     Pneumococcal 23 valent 01/29/2015     Tdap (Adult) Unspecified Formulation 12/30/2014 12/11/2018 Hepatitis B Surface Antibody nonreactive  ([facility] lab - despite 2 rounds of immunization as above)       4/12/2019 Hepatitis A IgG positive  4/29/2019 Measles IgG positive  4/29/2019 Mumps IgG  positive  4/29/2019 Rubella IgG positive    4/12/2019  Quantiferon TB Gold Plus positive  (completed treatment for latent TB in [place] in [id number])      Please have Mogos return to see me in July for routine HIV care.      Bartolo Amor MD, MS  Infectious Disease    Time:  I spent 20 minutes in direct care with this patient, including >50% of time face-to-face with the patient counseling about the plan of care and discussing his vaccine history.    Barotlo Amor MD

## 2019-04-29 NOTE — NURSING NOTE
"Chief Complaint   Patient presents with     RECHECK     B20     /71   Pulse 69   Temp 97.8  F (36.6  C) (Oral)   Ht 1.753 m (5' 9\")   Wt 61.1 kg (134 lb 12.8 oz)   SpO2 99%   BMI 19.91 kg/m    Afshan Everett CMA    "

## 2019-04-29 NOTE — LETTER
Date:May 9, 2019      Patient was self referred, no letter generated. Do not send.        [facility] Health Information

## 2019-04-29 NOTE — LETTER
4/29/2019       RE: Sara Benjmain  [address]  [facility] [address]     Dear Colleague,    Thank you for referring your patient, Sara Benjamin, to the [facility] AND INFECTIOUS DISEASES at [facility]. Please see a copy of my visit note below.        Infectious Disease Clinic Note    Mr. Sara Benjamin returns today for short interval follow-up of his HIV, to review immunization history.    He requested this appointment because he needed a physician to complete an immigration form that required a history and physical, along with immunization history.  Unfortunately, Sara did not think to bring this form with him today.  I attempted to call the center that is working with him on his immigration status, but they were closed for the day at the time of our appointment.   Sara decided that he will come back soon, and bring his form with him the next time.    At our last appointment, Sara signed a BIJU and so I was able to obtain his medial records from the [facility] in [place] where he last received HIV care.   The following is his update vaccination history.  We administered his 2nd Menveo today, and ordered some titers with the goal of having as complete information as possible.    PLAN:    - Return in 1-2 weeks for a short visit to fill out the immigration form.   Sara will need to see one of my colleagues as I will be out on leave for the month of May.       Here is the information for his immunization history, now updated.    Immunization History   Administered Date(s) Administered     HepB-Adult 01/29/2015, 04/09/2015, 07/23/2015, 12/07/2016     HepB-Dialysis 05/12/2016, 07/14/2016     Influenza Vaccine, 3 YRS +, IM (QUADRIVALENT W/PRESERVATIVES) 12/06/2017     Meningococcal (Menveo ) 03/20/2018, 04/29/2019     Pneumo Conj 13-V (2010&after) 04/09/2015     Pneumococcal 23 valent 01/29/2015     Tdap (Adult) Unspecified Formulation  12/30/2014 12/11/2018 Hepatitis B Surface Antibody nonreactive  ([facility] lab - despite 2 rounds of immunization as above)       4/12/2019 Hepatitis A IgG positive  4/29/2019 Measles IgG positive  4/29/2019 Mumps IgG positive  4/29/2019 Rubella IgG positive    4/12/2019  Quantiferon TB Gold Plus positive  (completed treatment for latent TB in [place] in [id number])      Please have Mogos return to see me in July for routine HIV care.      Bartolo Amor MD, MS  Infectious Disease    Time:  I spent 20 minutes in direct care with this patient, including >50% of time face-to-face with the patient counseling about the plan of care and discussing his vaccine history.    Again, thank you for allowing me to participate in the care of your patient.      Sincerely,    Bartolo Amor MD

## 2019-04-29 NOTE — LETTER
Date:May 9, 2019      Patient was self referred, no letter generated. Do not send.        [facility] Health Information

## 2019-04-30 LAB
MEV IGG SER QL IA: 3.9 AI (ref 0–0.8)
MUV IGG SER QL IA: 4.4 AI (ref 0–0.8)
RUBV IGG SERPL IA-ACNC: 17 IU/ML

## 2019-05-01 DIAGNOSIS — B20 HUMAN IMMUNODEFICIENCY VIRUS (HIV) DISEASE (H): ICD-10-CM

## 2019-05-01 NOTE — TELEPHONE ENCOUNTER
Health Call Center    Phone Message    May a detailed message be left on voicemail: yes    Reason for Call: Medication Refill Request    Has the patient contacted the pharmacy for the refill? Yes   Name of medication being requested: bictegravir-emtricitabine-tenofovir (BIKTARVY) -25 MG per tablet  Provider who prescribed the medication: Marsh  Pharmacy:  [facility] ([address] [place] [address]) [place], [place]     Date medication is needed: ASAP      Action Taken: Message routed to:  [facility]): Med Refills

## 2019-05-10 ENCOUNTER — OFFICE VISIT (OUTPATIENT)
Dept: INFECTIOUS DISEASES | Facility: CLINIC | Age: 49
End: 2019-05-10
Attending: INTERNAL MEDICINE
Payer: COMMERCIAL

## 2019-05-10 VITALS
TEMPERATURE: 98.3 F | HEART RATE: 67 BPM | DIASTOLIC BLOOD PRESSURE: 69 MMHG | WEIGHT: 137.9 LBS | SYSTOLIC BLOOD PRESSURE: 109 MMHG | HEIGHT: 70 IN | BODY MASS INDEX: 19.74 KG/M2

## 2019-05-10 DIAGNOSIS — B20 HUMAN IMMUNODEFICIENCY VIRUS (HIV) DISEASE (H): Primary | ICD-10-CM

## 2019-05-10 PROCEDURE — G0463 HOSPITAL OUTPT CLINIC VISIT: HCPCS | Mod: ZF

## 2019-05-10 ASSESSMENT — PAIN SCALES - GENERAL: PAINLEVEL: NO PAIN (0)

## 2019-05-10 ASSESSMENT — MIFFLIN-ST. JEOR: SCORE: 1496.76

## 2019-05-10 NOTE — LETTER
Date:May 14, 2019      Patient was self referred, no letter generated. Do not send.        [facility] Health Information

## 2019-05-10 NOTE — LETTER
5/10/2019      RE: Sara Benjamin  [address]  [facility] [address]       [facility]  HIV Follow Up Visit    Chief Complaint:  RECHECK (follow up with mario AGUIRRE cma)    HPI:  Sara Benjamin is a 48 year old male who presents for HIV follow up and completion of immunization forms. Patient was last seen by Dr. Amor on 4/29 for completion of immigration form and required physical and immunization history, but unfortunately did not bring the forms at that appointment. He returns today for his immunization record from [place] and [place]. BIJU had been requested at time of past appointment but unfortunately no records have since been made available from [facility] at this time. Patient states that he does not require completion of any forms and plans to schedule an appointment with an immigration provider for green card evaluation. This has not yet been arranged, but patient states he will need a complete immunization record prior to his appointment. Patient previously sought care through [facility] where he was followed by infectious disease there.     ROS: Complete 12-point ROS is negative except as noted above.    Past Medical History:  Past Medical History:   Diagnosis Date     Condyloma acuminata      HIV (human immunodeficiency virus infection) (H)     on Biktarvy       Social History:  Social History     Socioeconomic History     Marital status:      Spouse name: Not on file     Number of children: Not on file     Years of education: Not on file     Highest education level: Not on file   Occupational History     Not on file   Social Needs     Financial resource strain: Not on file     Food insecurity:     Worry: Not on file     Inability: Not on file     Transportation needs:     Medical: Not on file     Non-medical: Not on file   Tobacco Use     Smoking status: Light Tobacco Smoker     Smokeless tobacco: Never Used   Substance and Sexual Activity     Alcohol  use: Not on file     Drug use: Not on file     Sexual activity: Not on file   Lifestyle     Physical activity:     Days per week: Not on file     Minutes per session: Not on file     Stress: Not on file   Relationships     Social connections:     Talks on phone: Not on file     Gets together: Not on file     Attends [employer] service: Not on file     Active member of club or organization: Not on file     Attends meetings of clubs or organizations: Not on file     Relationship status: Not on file     Intimate partner violence:     Fear of current or ex partner: Not on file     Emotionally abused: Not on file     Physically abused: Not on file     Forced sexual activity: Not on file   Other Topics Concern     Parent/sibling w/ CABG, MI or angioplasty before 65F 55M? Not Asked   Social History Narrative     Not on file       Family Medical History:  Reviewed and unchanged from prior.    Allergies:   No Known Allergies    Medications:  Current Outpatient Medications   Medication Sig Dispense Refill     bictegravir-emtricitabine-tenofovir (BIKTARVY) -25 MG per tablet Take 1 tablet by mouth daily 30 tablet 7     imiquimod (ALDARA) 5 % external cream Apply topically three times a week 48 packet 3     SENNA-docusate sodium (SENNA S) 8.6-50 MG tablet Take 1 tablet by mouth 2 times daily 60 tablet 11       Immunizations:  Immunization History   Administered Date(s) Administered     HepB-Adult 01/29/2015, 04/09/2015, 07/23/2015, 12/07/2016     HepB-Dialysis 05/12/2016, 07/14/2016     Influenza Vaccine, 3 YRS +, IM (QUADRIVALENT W/PRESERVATIVES) 12/06/2017     Meningococcal (Menveo ) 03/20/2018, 04/29/2019     Pneumo Conj 13-V (2010&after) 04/09/2015     Pneumococcal 23 valent 01/29/2015     Tdap (Adult) Unspecified Formulation 12/30/2014       Exam:  B/P: 109/69, T: 98.3, P: 67, R: Data Unavailable, Weight:   Wt Readings from Last 2 Encounters:   05/10/19 62.6 kg (137 lb 14.4 oz)   04/29/19 61.1 kg (134 lb 12.8 oz)    Constitutional: NAD, pleasant, cooperative,  HEENT: Sclera anicteric, Poor dentition, MMM  CV: RRR, no murmurs, gallops or rubs. JVD normal   Respiratory: CTAB, no wheezing, crackles or rales. Non-labored  Abd: Soft, NT/ND, +bs, no HSM  Skin: No lesions or rashes over exposed areas, normal color, texture and turgor  Neuro: AAO x 3    Labs:  T Cell Subset:  Absolute CD4   Date Value Ref Range Status   12/17/2018 1,630 441 - 2,156 cells/uL Final     CD4 [place] T   Date Value Ref Range Status   12/17/2018 46 28 - 63 % Final     CD8 Suppressor T   Date Value Ref Range Status   12/17/2018 30 10 - 40 % Final     CD3 Mature T   Date Value Ref Range Status   12/17/2018 79 49 - 84 % Final     CD4:CD8 Ratio   Date Value Ref Range Status   12/17/2018 1.53 1.40 - 2.60 Final     WBC   Date Value Ref Range Status   04/12/2019 5.4 4.0 - 11.0 10e9/L Final     % Lymphocytes   Date Value Ref Range Status   04/12/2019 37.7 % Final     Absolute CD3   Date Value Ref Range Status   12/17/2018 2,778 603 - 2,990 cells/uL Final     Absolute CD8   Date Value Ref Range Status   12/17/2018 1,066 125 - 1,312 cells/uL Final       HIV-1 RNA Quantitative:  HIV-1 RNA Quant Result   Date Value Ref Range Status   04/12/2019 HIV-1 RNA Not Detected HIVND^HIV-1 RNA Not Detected [Copies]/mL Final     Comment:     The ALEYDA AmpliPrep/ALEYDA TaqMan HIV-1 test is an FDA-approved in vitro   nucleic acid amplification test for the quantitation of HIV-1 RNA in human   plasma (EDTA plasma) using the ALEYDA AmpliPrep instrument for automated viral   nucleic acid extraction and the ALEYDA TaqMan Analyzer or ALEYDA TaqMan for   automated Real Time PCR amplification and detection of the viral nucleic acid   target.  Titer results are reported in copies/ml. This assay is intended for use in   conjunction with clinical presentation and other laboratory markers of disease   prognosis and for use as an aid in assessing viral response to antiretroviral   treatment as  measured by changes in plasma HIV-1 RNA levels. This test should   not be used as a donor screening test to confirm the presence of HIV-1   infection.           Assessment and Plan:  Sara Benjamin is a 48 year old male with a PMH notable for HIV who presents for immunization records. Unfortunately we do not have the outside records he was seeking at this time. I have reached out to [facility] who will plan to fax immunization records today, and have completed a new BIJU form specific to the [facility] where he previously sought care. Patient is currently followed with [employer] health organization in the [facility] for further assistance with his green card process, but was provided with social works information here should he require additional assistance.     Patient seen and discussed with Dr. Gaetano Menchaca, DO  Internal Medicine PGY2  Pager [phone] (Text Page)    ID Staff:  I saw and examined this patient with Dr. Menchaca and discussed with her the assessment and plan,  I agree with this note.     Rafael Mckeon MD  Professor of Medicine          Rafael Mckeon MD

## 2019-05-10 NOTE — NURSING NOTE
"/69   Pulse 67   Temp 98.3  F (36.8  C) (Oral)   Ht 1.778 m (5' 10\")   Wt 62.6 kg (137 lb 14.4 oz)   BMI 19.79 kg/m    Chief Complaint   Patient presents with     RECHECK     follow up with amrio AGUIRRE cma       "

## 2019-05-10 NOTE — PROGRESS NOTES
[facility]  HIV Follow Up Visit    Chief Complaint:  RECHECK (follow up with mario AGUIRRE cma)    HPI:  Sara Benjamin is a 48 year old male who presents for HIV follow up and completion of immunization forms. Patient was last seen by Dr. Amor on 4/29 for completion of immigration form and required physical and immunization history, but unfortunately did not bring the forms at that appointment. He returns today for his immunization record from [place] and [place]. BIJU had been requested at time of past appointment but unfortunately no records have since been made available from [facility] at this time. Patient states that he does not require completion of any forms and plans to schedule an appointment with an immigration provider for green card evaluation. This has not yet been arranged, but patient states he will need a complete immunization record prior to his appointment. Patient previously sought care through [facility] where he was followed by infectious disease there.     ROS: Complete 12-point ROS is negative except as noted above.    Past Medical History:  Past Medical History:   Diagnosis Date     Condyloma acuminata      HIV (human immunodeficiency virus infection) (H)     on Biktarvy       Social History:  Social History     Socioeconomic History     Marital status:      Spouse name: Not on file     Number of children: Not on file     Years of education: Not on file     Highest education level: Not on file   Occupational History     Not on file   Social Needs     Financial resource strain: Not on file     Food insecurity:     Worry: Not on file     Inability: Not on file     Transportation needs:     Medical: Not on file     Non-medical: Not on file   Tobacco Use     Smoking status: Light Tobacco Smoker     Smokeless tobacco: Never Used   Substance and Sexual Activity     Alcohol use: Not on file     Drug use: Not on file     Sexual activity: Not on file    Lifestyle     Physical activity:     Days per week: Not on file     Minutes per session: Not on file     Stress: Not on file   Relationships     Social connections:     Talks on phone: Not on file     Gets together: Not on file     Attends Synagogue service: Not on file     Active member of club or organization: Not on file     Attends meetings of clubs or organizations: Not on file     Relationship status: Not on file     Intimate partner violence:     Fear of current or ex partner: Not on file     Emotionally abused: Not on file     Physically abused: Not on file     Forced sexual activity: Not on file   Other Topics Concern     Parent/sibling w/ CABG, MI or angioplasty before 65F 55M? Not Asked   Social History Narrative     Not on file       Family Medical History:  Reviewed and unchanged from prior.    Allergies:   No Known Allergies    Medications:  Current Outpatient Medications   Medication Sig Dispense Refill     bictegravir-emtricitabine-tenofovir (BIKTARVY) -25 MG per tablet Take 1 tablet by mouth daily 30 tablet 7     imiquimod (ALDARA) 5 % external cream Apply topically three times a week 48 packet 3     SENNA-docusate sodium (SENNA S) 8.6-50 MG tablet Take 1 tablet by mouth 2 times daily 60 tablet 11       Immunizations:  Immunization History   Administered Date(s) Administered     HepB-Adult 01/29/2015, 04/09/2015, 07/23/2015, 12/07/2016     HepB-Dialysis 05/12/2016, 07/14/2016     Influenza Vaccine, 3 YRS +, IM (QUADRIVALENT W/PRESERVATIVES) 12/06/2017     Meningococcal (Menveo ) 03/20/2018, 04/29/2019     Pneumo Conj 13-V (2010&after) 04/09/2015     Pneumococcal 23 valent 01/29/2015     Tdap (Adult) Unspecified Formulation 12/30/2014       Exam:  B/P: 109/69, T: 98.3, P: 67, R: Data Unavailable, Weight:   Wt Readings from Last 2 Encounters:   05/10/19 62.6 kg (137 lb 14.4 oz)   04/29/19 61.1 kg (134 lb 12.8 oz)   Constitutional: NAD, pleasant, cooperative,  HEENT: Sclera anicteric, Poor  dentition, MMM  CV: RRR, no murmurs, gallops or rubs. JVD normal   Respiratory: CTAB, no wheezing, crackles or rales. Non-labored  Abd: Soft, NT/ND, +bs, no HSM  Skin: No lesions or rashes over exposed areas, normal color, texture and turgor  Neuro: AAO x 3    Labs:  T Cell Subset:  Absolute CD4   Date Value Ref Range Status   12/17/2018 1,630 441 - 2,156 cells/uL Final     CD4 [place] T   Date Value Ref Range Status   12/17/2018 46 28 - 63 % Final     CD8 Suppressor T   Date Value Ref Range Status   12/17/2018 30 10 - 40 % Final     CD3 Mature T   Date Value Ref Range Status   12/17/2018 79 49 - 84 % Final     CD4:CD8 Ratio   Date Value Ref Range Status   12/17/2018 1.53 1.40 - 2.60 Final     WBC   Date Value Ref Range Status   04/12/2019 5.4 4.0 - 11.0 10e9/L Final     % Lymphocytes   Date Value Ref Range Status   04/12/2019 37.7 % Final     Absolute CD3   Date Value Ref Range Status   12/17/2018 2,778 603 - 2,990 cells/uL Final     Absolute CD8   Date Value Ref Range Status   12/17/2018 1,066 125 - 1,312 cells/uL Final       HIV-1 RNA Quantitative:  HIV-1 RNA Quant Result   Date Value Ref Range Status   04/12/2019 HIV-1 RNA Not Detected HIVND^HIV-1 RNA Not Detected [Copies]/mL Final     Comment:     The ALEYDA AmpliPrep/ALEYDA TaqMan HIV-1 test is an FDA-approved in vitro   nucleic acid amplification test for the quantitation of HIV-1 RNA in human   plasma (EDTA plasma) using the ALEYDA AmpliPrep instrument for automated viral   nucleic acid extraction and the ALEYDA TaqMan Analyzer or ALEYDA TaqMan for   automated Real Time PCR amplification and detection of the viral nucleic acid   target.  Titer results are reported in copies/ml. This assay is intended for use in   conjunction with clinical presentation and other laboratory markers of disease   prognosis and for use as an aid in assessing viral response to antiretroviral   treatment as measured by changes in plasma HIV-1 RNA levels. This test should   not be used as  a donor screening test to confirm the presence of HIV-1   infection.           Assessment and Plan:  Sara Benjamin is a 48 year old male with a PMH notable for HIV who presents for immunization records. Unfortunately we do not have the outside records he was seeking at this time. I have reached out to [facility] who will plan to fax immunization records today, and have completed a new BIJU form specific to the [facility] where he previously sought care. Patient is currently followed with international health organization in the [facility] for further assistance with his green card process, but was provided with social works information here should he require additional assistance.     Patient seen and discussed with Dr. Gaetano Menchaca, DO  Internal Medicine PGY2  Pager [phone] (Text Page)    ID Staff:  I saw and examined this patient with Dr. Menchaca and discussed with her the assessment and plan,  I agree with this note.     Rafael Mckeon MD  Professor of Medicine

## 2019-05-14 ENCOUNTER — TELEPHONE (OUTPATIENT)
Dept: INFECTIOUS DISEASES | Facility: CLINIC | Age: 49
End: 2019-05-14

## 2019-05-20 ENCOUNTER — TELEPHONE (OUTPATIENT)
Dept: DERMATOLOGY | Facility: CLINIC | Age: 49
End: 2019-05-20

## 2019-05-20 NOTE — TELEPHONE ENCOUNTER
[facility] Call Center    Phone Message    May a detailed message be left on voicemail: yes    Reason for Call: Other: The pt's friend called to cancel and resched today's derm proc. The pt has to go to work and has an immigration hearing too. please call [phone] to resched. Thanks.     Action Taken: Message routed to:  [facility] nunu derm surg

## 2019-05-21 ENCOUNTER — TELEPHONE (OUTPATIENT)
Dept: DERMATOLOGY | Facility: CLINIC | Age: 49
End: 2019-05-21

## 2019-05-21 NOTE — TELEPHONE ENCOUNTER
[facility]    Phone Message    May a detailed message be left on voicemail: yes    Reason for Call: Other: Pt calling to schedule a procedure appt for derm, please call to help schedule     Action Taken: Message routed to:  [facility]): Derm

## 2019-05-31 DIAGNOSIS — Z00.00 HEALTH CARE MAINTENANCE: ICD-10-CM

## 2019-05-31 DIAGNOSIS — Z71.85 IMMUNIZATION COUNSELING: ICD-10-CM

## 2019-05-31 DIAGNOSIS — Z78.9 UNKNOWN VARICELLA VACCINATION STATUS: ICD-10-CM

## 2019-05-31 DIAGNOSIS — Z78.9: ICD-10-CM

## 2019-05-31 DIAGNOSIS — Z00.00 HEALTH CARE MAINTENANCE: Primary | ICD-10-CM

## 2019-06-01 LAB — VZV IGG SER QL IA: 4.4 AI (ref 0–0.8)

## 2019-06-05 ENCOUNTER — ANCILLARY PROCEDURE (OUTPATIENT)
Dept: GENERAL RADIOLOGY | Facility: CLINIC | Age: 49
End: 2019-06-05
Attending: INTERNAL MEDICINE

## 2019-06-05 ENCOUNTER — DOCUMENTATION ONLY (OUTPATIENT)
Dept: INFECTIOUS DISEASES | Facility: CLINIC | Age: 49
End: 2019-06-05

## 2019-06-05 DIAGNOSIS — R76.12 POSITIVE QUANTIFERON-TB GOLD TEST: ICD-10-CM

## 2019-06-05 DIAGNOSIS — R76.12 POSITIVE QUANTIFERON-TB GOLD TEST: Primary | ICD-10-CM

## 2019-06-05 LAB — RADIOLOGIST FLAGS: NORMAL

## 2019-06-05 NOTE — PROGRESS NOTES
Pt came in today with referral from [facility] [place] saying that he has positive quant gold and needs CXR. Dr. Amor put in order for CXR and pt had this done today. Also gave pt a letter with his record of UTD vaccinations so that he has this in order to get his green card. Told him he could come back tomorrow to  a printed copy of his CXR results in order to turn it in to get green card.  Lillie Garcia, RN

## 2019-06-08 LAB
PV1 NAB TITR SER NT: NORMAL {TITER}
PV3 NAB TITR SER NT: NORMAL {TITER}

## 2019-06-10 ENCOUNTER — TELEPHONE (OUTPATIENT)
Dept: INFECTIOUS DISEASES | Facility: CLINIC | Age: 49
End: 2019-06-10

## 2019-06-10 NOTE — TELEPHONE ENCOUNTER
[facility] Call Center    Phone Message    May a detailed message be left on voicemail: no    Reason for Call: Other: Caroline from [facility] called to report incidental findings on an Xray from 6/5     Action Taken: Message routed to:  [facility] ([facility] ID clinic

## 2019-06-13 ENCOUNTER — TELEPHONE (OUTPATIENT)
Dept: PHARMACY | Facility: CLINIC | Age: 49
End: 2019-06-13

## 2019-06-13 NOTE — TELEPHONE ENCOUNTER
MAXX for pt to call me. Per Girish, pt left a message on his phone asking for me and has a question about his medication.    Padma Snyder, [facility] Pharmacist.   [phone]

## 2019-07-03 ENCOUNTER — TELEPHONE (OUTPATIENT)
Dept: INFECTIOUS DISEASES | Facility: CLINIC | Age: 49
End: 2019-07-03

## 2019-07-08 ENCOUNTER — MYC MEDICAL ADVICE (OUTPATIENT)
Dept: INFECTIOUS DISEASES | Facility: CLINIC | Age: 49
End: 2019-07-08

## 2019-07-09 ENCOUNTER — OFFICE VISIT (OUTPATIENT)
Dept: INFECTIOUS DISEASES | Facility: CLINIC | Age: 49
End: 2019-07-09
Attending: INTERNAL MEDICINE
Payer: MEDICAID

## 2019-07-09 VITALS
HEIGHT: 68 IN | WEIGHT: 131.4 LBS | OXYGEN SATURATION: 98 % | BODY MASS INDEX: 19.91 KG/M2 | SYSTOLIC BLOOD PRESSURE: 92 MMHG | TEMPERATURE: 97.8 F | DIASTOLIC BLOOD PRESSURE: 51 MMHG | HEART RATE: 67 BPM

## 2019-07-09 DIAGNOSIS — B20 HUMAN IMMUNODEFICIENCY VIRUS (HIV) DISEASE (H): ICD-10-CM

## 2019-07-09 DIAGNOSIS — B20 HUMAN IMMUNODEFICIENCY VIRUS (HIV) DISEASE (H): Primary | ICD-10-CM

## 2019-07-09 LAB
ALBUMIN SERPL-MCNC: 3.8 G/DL (ref 3.4–5)
ALP SERPL-CCNC: 61 U/L (ref 40–150)
ALT SERPL W P-5'-P-CCNC: 24 U/L (ref 0–70)
ANION GAP SERPL CALCULATED.3IONS-SCNC: 5 MMOL/L (ref 3–14)
AST SERPL W P-5'-P-CCNC: 15 U/L (ref 0–45)
BASOPHILS # BLD AUTO: 0 10E9/L (ref 0–0.2)
BASOPHILS NFR BLD AUTO: 0.4 %
BILIRUB SERPL-MCNC: 0.3 MG/DL (ref 0.2–1.3)
BUN SERPL-MCNC: 13 MG/DL (ref 7–30)
CALCIUM SERPL-MCNC: 8.7 MG/DL (ref 8.5–10.1)
CHLORIDE SERPL-SCNC: 110 MMOL/L (ref 94–109)
CO2 SERPL-SCNC: 24 MMOL/L (ref 20–32)
CREAT SERPL-MCNC: 0.79 MG/DL (ref 0.66–1.25)
DIFFERENTIAL METHOD BLD: ABNORMAL
EOSINOPHIL # BLD AUTO: 0 10E9/L (ref 0–0.7)
EOSINOPHIL NFR BLD AUTO: 0.7 %
ERYTHROCYTE [DISTWIDTH] IN BLOOD BY AUTOMATED COUNT: 12.4 % (ref 10–15)
GFR SERPL CREATININE-BSD FRML MDRD: >90 ML/MIN/{1.73_M2}
GLUCOSE SERPL-MCNC: 81 MG/DL (ref 70–99)
HCT VFR BLD AUTO: 38.5 % (ref 40–53)
HGB BLD-MCNC: 13.1 G/DL (ref 13.3–17.7)
IMM GRANULOCYTES # BLD: 0 10E9/L (ref 0–0.4)
IMM GRANULOCYTES NFR BLD: 0 %
LYMPHOCYTES # BLD AUTO: 2.4 10E9/L (ref 0.8–5.3)
LYMPHOCYTES NFR BLD AUTO: 51.9 %
MCH RBC QN AUTO: 29.8 PG (ref 26.5–33)
MCHC RBC AUTO-ENTMCNC: 34 G/DL (ref 31.5–36.5)
MCV RBC AUTO: 88 FL (ref 78–100)
MONOCYTES # BLD AUTO: 0.4 10E9/L (ref 0–1.3)
MONOCYTES NFR BLD AUTO: 7.7 %
NEUTROPHILS # BLD AUTO: 1.8 10E9/L (ref 1.6–8.3)
NEUTROPHILS NFR BLD AUTO: 39.3 %
NRBC # BLD AUTO: 0 10*3/UL
NRBC BLD AUTO-RTO: 0 /100
PLATELET # BLD AUTO: 175 10E9/L (ref 150–450)
POTASSIUM SERPL-SCNC: 3.7 MMOL/L (ref 3.4–5.3)
PROT SERPL-MCNC: 7 G/DL (ref 6.8–8.8)
RBC # BLD AUTO: 4.4 10E12/L (ref 4.4–5.9)
SODIUM SERPL-SCNC: 139 MMOL/L (ref 133–144)
WBC # BLD AUTO: 4.6 10E9/L (ref 4–11)

## 2019-07-09 PROCEDURE — 86359 T CELLS TOTAL COUNT: CPT | Performed by: INTERNAL MEDICINE

## 2019-07-09 PROCEDURE — 80053 COMPREHEN METABOLIC PANEL: CPT | Performed by: INTERNAL MEDICINE

## 2019-07-09 PROCEDURE — G0463 HOSPITAL OUTPT CLINIC VISIT: HCPCS | Mod: ZF

## 2019-07-09 PROCEDURE — T1013 SIGN LANG/ORAL INTERPRETER: HCPCS | Mod: U3,ZF | Performed by: INTERNAL MEDICINE

## 2019-07-09 PROCEDURE — 87536 HIV-1 QUANT&REVRSE TRNSCRPJ: CPT | Performed by: INTERNAL MEDICINE

## 2019-07-09 PROCEDURE — 85025 COMPLETE CBC W/AUTO DIFF WBC: CPT | Performed by: INTERNAL MEDICINE

## 2019-07-09 PROCEDURE — 86360 T CELL ABSOLUTE COUNT/RATIO: CPT | Performed by: INTERNAL MEDICINE

## 2019-07-09 PROCEDURE — 36415 COLL VENOUS BLD VENIPUNCTURE: CPT | Performed by: INTERNAL MEDICINE

## 2019-07-09 ASSESSMENT — MIFFLIN-ST. JEOR: SCORE: 1435.53

## 2019-07-09 ASSESSMENT — PAIN SCALES - GENERAL: PAINLEVEL: NO PAIN (0)

## 2019-07-09 NOTE — NURSING NOTE
"Chief Complaint   Patient presents with     RECHECK     B20     BP 92/51   Pulse 67   Temp 97.8  F (36.6  C) (Oral)   Ht 1.727 m (5' 8\")   Wt 59.6 kg (131 lb 6.4 oz)   SpO2 98%   BMI 19.98 kg/m    Afshan Everett [facility]  7/9/2019 1:06 PM      "

## 2019-07-09 NOTE — LETTER
Date:July 22, 2019      Patient was self referred, no letter generated. Do not send.        [facility] Health Information

## 2019-07-09 NOTE — PROGRESS NOTES
Infectious Disease Clinic Note    Sara returns today for routine follow-up of his HIV infection.   He reports easy daily adherence to Biktarvy with no concerns for adverse effects.    He is still struggling to complete his immigration paperwork, but did see a civil surgeon and has made some progress.  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  The civil surgeon wanted Sara cleared from active TB infection.  Sara was treated for LTBI in [place] in 2016.  His chest XR does show evidence of some scarring, perhaps from previous TB infection.  He has no signs or symptoms of active disease. Sara was scheduled to follow-up at the [facility] TB clinic, but I think all he needs is a letter saying he does not have active TB.  We will see if we can help with this.    Sara reports a recent insurance issue.  Our SW met with him today to try and work this out.    PLAN:    - Routine HIV labs today    - Continue Biktarvy    - return to Dermatology to continue treatment for extensive large condyloma acuminata (with concern for malignant transformation, although biopsies did not demonstrate this)    - RTC in 3 months      Bartolo Amor MD, MS  Infectious Disease    Time:  I spent 25 minutes in direct care with this patient, including >50% of time face-to-face with the patient counseling and helping with care coordination.

## 2019-07-09 NOTE — LETTER
7/9/2019      RE: Sara Sourav  [address]  [place] [address]         Routine HIV follow-up    Also encounter for immigration paperwork    Bartolo Amor MD

## 2019-07-10 LAB
CD3 CELLS # BLD: 1924 CELLS/UL (ref 603–2990)
CD3 CELLS NFR BLD: 83 % (ref 49–84)
CD3+CD4+ CELLS # BLD: 1197 CELLS/UL (ref 441–2156)
CD3+CD4+ CELLS NFR BLD: 51 % (ref 28–63)
CD3+CD4+ CELLS/CD3+CD8+ CLL BLD: 1.82 % (ref 1.4–2.6)
CD3+CD8+ CELLS # BLD: 663 CELLS/UL (ref 125–1312)
CD3+CD8+ CELLS NFR BLD: 28 % (ref 10–40)
IFC SPECIMEN: NORMAL

## 2019-08-12 ENCOUNTER — OFFICE VISIT (OUTPATIENT)
Dept: DERMATOLOGY | Facility: CLINIC | Age: 49
End: 2019-08-12
Payer: COMMERCIAL

## 2019-08-12 VITALS — HEART RATE: 69 BPM | DIASTOLIC BLOOD PRESSURE: 77 MMHG | SYSTOLIC BLOOD PRESSURE: 117 MMHG

## 2019-08-12 DIAGNOSIS — Z21 HIV INFECTION, UNSPECIFIED SYMPTOM STATUS (H): ICD-10-CM

## 2019-08-12 DIAGNOSIS — A63.0 CONDYLOMA ACUMINATA: Primary | ICD-10-CM

## 2019-08-12 RX ORDER — IMIQUIMOD 12.5 MG/.25G
CREAM TOPICAL
Qty: 48 PACKET | Refills: 3 | Status: SHIPPED | OUTPATIENT
Start: 2019-08-12 | End: 2019-09-09

## 2019-08-12 ASSESSMENT — PAIN SCALES - GENERAL: PAINLEVEL: NO PAIN (0)

## 2019-08-12 NOTE — LETTER
8/12/2019       RE: Sara Benjamin  [address]  [facility] [address]     Dear Colleague,    Thank you for referring your patient, Sara Benjamin, to the [facility] DERMATOLOGY at [facility]. Please see a copy of my visit note below.    [facility] Dermatology Note      Dermatology Problem List:  1. Extensive large condyloma acuminata with concern for malignant transformation diffusely distributed in the groin and genital area, in context of HIV (VL undetectable; CD4 >1197; on antiretrovirals)              - multiple shave biopsies performed 3/4/19 (no SCCis identified), 4/5/19 on largest lesions              - imiquimod started 3/4/19              -  s/p extensive cryotherapy 8/12/19    Encounter Date: Aug 12, 2019    CC:  Chief Complaint   Patient presents with     Derm Problem     Sara is here today for a follow up for his warts that are getting better.          History of Present Illness:  Mr. Sara Benjamin is a 48 year old male who presents as a follow-up for condyloma acuminata on the penis, scrotum, groin, and left postauricular sulcus with concern for malignant transformation. The patient was last seen 4/5/19 when three biopsies were taken, and he continued topical imiquimod. Today, the patient reports that he has been applying the topical imiquimod, and he has been noticing improvement of the lesions. He has not noticed any new lesions developing since his last visit. He has run out of imiquimod and would like a refill. The patient voices no other concerns.     Past Medical History:   Patient Active Problem List   Diagnosis     Condyloma acuminata     Past Medical History:   Diagnosis Date     Condyloma acuminata      HIV (human immunodeficiency virus infection) (H)     on Biktarvy     No past surgical history on file.    Social History:  Patient reports that he has quit smoking. He has never used smokeless tobacco.    Family History:  No family history  on file.    Medications:  Current Outpatient Medications   Medication Sig Dispense Refill     bictegravir-emtricitabine-tenofovir (BIKTARVY) -25 MG per tablet Take 1 tablet by mouth daily 30 tablet 11     imiquimod (ALDARA) 5 % external cream Apply topically three times a week 48 packet 3     SENNA-docusate sodium (SENNA S) 8.6-50 MG tablet Take 1 tablet by mouth 2 times daily 60 tablet 11       No Known Allergies    Review of Systems:  -Constitutional: Otherwise feeling well today, in usual state of health.  -HEENT: Patient denies nonhealing oral sores.  -Skin: As above in HPI. No additional skin concerns.    Physical exam:  Vitals: /77   Pulse 69   GEN: This is a well developed, well-nourished male in no acute distress, in a pleasant mood.    SKIN: Focused examination of the genital area and L postauricular area was performed.  -Granger skin type: IV  -Numerous large hyperpigmented verrucous plaques on the scrotum, shaft of the penis, groin, and left postauricular sulcus with marked interval improvement of the lesions on the inferior abdomen and groin  -No other lesions of concern on areas examined.       Impression/Plan:  1. Extensive and very large condyloma acuminata diffusely distributed on the groin, genital area, and left postauricular sulcus in the context of HIV.  Much improved on exam today.    We discussed the risks and benefits of treatment with cryotherapy and continued treatment with imiquimod. The patient is agreeable with this plan.    Cryotherapy procedure note: After verbal consent and discussion of risks and benefits including but no limited to dyspigmentation/scar, blister, and pain, 18 was(were) treated with 1-2mm freeze border for 2 cycles with liquid nitrogen. Post cryotherapy instructions were provided.    Continue imiquimod 5% cream daily five times a week.    CC Bartolo Amor MD  [address] [place] [address] on close of this encounter.  Follow-up in 4 weeks,  earlier for new or changing lesions.       Staff Involved:  Scribe/Staff    Scribe Disclosure  I, Dominic Najjar, am serving as a scribe to document services personally performed by Dr. Guillermo Stanley MD, based on data collection and the provider's statements to me.    Provider Disclosure:   The documentation recorded by the scribe accurately reflects the services I personally performed and the decisions made by me.    Guillermo Stanley MD   of Dermatology  Department of Dermatology  [facility]

## 2019-08-12 NOTE — NURSING NOTE
Dermatology Rooming Note    Sara Benjamin's goals for this visit include:   Chief Complaint   Patient presents with     Derm Problem     Sara is here today for a follow up for his warts that are getting better.      JERMAIN Mclean

## 2019-09-05 ENCOUNTER — DOCUMENTATION ONLY (OUTPATIENT)
Dept: CARE COORDINATION | Facility: CLINIC | Age: 49
End: 2019-09-05

## 2019-09-09 ENCOUNTER — OFFICE VISIT (OUTPATIENT)
Dept: DERMATOLOGY | Facility: CLINIC | Age: 49
End: 2019-09-09
Payer: COMMERCIAL

## 2019-09-09 VITALS — DIASTOLIC BLOOD PRESSURE: 80 MMHG | SYSTOLIC BLOOD PRESSURE: 125 MMHG | HEART RATE: 68 BPM

## 2019-09-09 DIAGNOSIS — A63.0 CONDYLOMA ACUMINATA: Primary | ICD-10-CM

## 2019-09-09 DIAGNOSIS — L01.00 IMPETIGO: ICD-10-CM

## 2019-09-09 RX ORDER — MUPIROCIN 20 MG/G
OINTMENT TOPICAL
Qty: 30 G | Refills: 1 | Status: SHIPPED | OUTPATIENT
Start: 2019-09-09 | End: 2023-11-01

## 2019-09-09 RX ORDER — IMIQUIMOD 12.5 MG/.25G
CREAM TOPICAL
Qty: 48 PACKET | Refills: 3 | Status: SHIPPED | OUTPATIENT
Start: 2019-09-09 | End: 2019-11-04

## 2019-09-09 ASSESSMENT — PAIN SCALES - GENERAL: PAINLEVEL: NO PAIN (0)

## 2019-09-09 NOTE — PROGRESS NOTES
[facility] Dermatology Note      Dermatology Problem List:  1. Extensive large condyloma acuminata diffusely distributed in the groin and genital area, in context of HIV (VL undetectable; CD4 >1197; on antiretrovirals)              - multiple shave biopsies performed 3/4/19, 4/5/19 on largest lesions (no SCC identified)              - imiquimod started 3/4/19              - s/p extensive cryotherapy 8/12/19, 9/9/19    Encounter Date: Sep 9, 2019    CC:  Chief Complaint   Patient presents with     Derm Problem     Sara is here today for a recheck on genital warts. He says there has been improvement.         History of Present Illness:  Mr. Sara Benjamin is a 49 year old male who presents as a follow-up for condyloma acuminata. The patient was last seen 8/12/19 when he underwent cryotherapy and he continued on imiquimod topically. Today, the patient reports that he has been noticing improvement of the lesion behind his left ear with use of the imiquimod, though it is still persistent. He also notes brown bumps on the scrotum that look like previously treated warts; he has not had the scrotal warts treated. He would like the areas evaluated. He has not been using the imiquimod in the past week on his scrotum or penis as he has noticed significant improvement of the lesions and did not feel it necessary.     Past Medical History:   Patient Active Problem List   Diagnosis     Condyloma acuminata     Past Medical History:   Diagnosis Date     Condyloma acuminata      HIV (human immunodeficiency virus infection) (H)     on Biktarvy     No past surgical history on file.    Social History:  Patient reports that he has quit smoking. He has never used smokeless tobacco.    Family History:  No family history on file.    Medications:  Current Outpatient Medications   Medication Sig Dispense Refill     bictegravir-emtricitabine-tenofovir (BIKTARVY) -25 MG per tablet Take 1 tablet by mouth daily 30 tablet  11     imiquimod (ALDARA) 5 % external cream Apply topically five times a week 48 packet 3     SENNA-docusate sodium (SENNA S) 8.6-50 MG tablet Take 1 tablet by mouth 2 times daily 60 tablet 11       No Known Allergies    Review of Systems:  -Constitutional: Otherwise feeling well today, in usual state of health.  -HEENT: Patient denies nonhealing oral sores.  -Skin: As above in HPI. No additional skin concerns.    Physical exam:  Vitals: /80 (BP Location: Left arm, Patient Position: Sitting, Cuff Size: Adult Regular)   Pulse 68   GEN: This is a well developed, well-nourished male in no acute distress, in a pleasant mood.    SKIN: Focused examination of the genital area, buttock, and face was performed.  -Granger skin type: IV  -Numerous tan verrucous and smooth papules on the scrotum and base of the penis  -there are multiple hyperpigmented/erythematous patches on the inferior abdomen, penis, and perianal area  -there is a verrucous hyperpigmented plaque in the L postauricular sulcus with prominent honey colored crust and associated peripheral erosions  -No other lesions of concern on areas examined.       Impression/Plan:  1. Extensive and very large condyloma acuminata diffusely distributed on the groin, genital area, and left postauricular sulcus in the context of HIV. Much improved on exam today but clinical appearance concerning for infection of the lesion in the L postauricular sulcus.    We discussed the risks and benefits of treatment with cryotherapy and continued treatment with imiquimod. We also discussed the risks and benefits of treatment with topical mupirocin for the lesion in the L postauricular  sulcus.The patient is agreeable with this plan.    Cryotherapy procedure note: After verbal consent and discussion of risks and benefits including but no limited to dyspigmentation/scar, blister, and pain, 17 was(were) treated with 1-2mm freeze border for 2 cycles with liquid nitrogen. Post  cryotherapy instructions were provided.    Start: mupirocin 2% ointment BID for one week to the lesion in the L postauricular sulcus    Continue imiquimod 5% cream daily five times  a week to the genital area and restart to the L postauricular sulcus after completion of mupirocin course.      CC Bartolo Amor MD  [address] [place] [address] on close of this encounter.  Follow-up in 6-8 weeks, earlier for new or changing lesions.       Staff Involved:  Scribe/Staff    Scribe Disclosure  I, Dominic Najjar, am serving as a scribe to document services personally performed by Dr. Guillermo Stanley MD, based on data collection and the provider's statements to me.    Provider Disclosure:   The documentation recorded by the scribe accurately reflects the services I personally performed and the decisions made by me.    Guillermo Stanley MD   of Dermatology  Department of Dermatology  [facility]

## 2019-09-09 NOTE — NURSING NOTE
Dermatology Rooming Note    Sara Benjamin's goals for this visit include:   Chief Complaint   Patient presents with     Derm Problem     Sara is here today for a recheck on genital warts. He says there has been improvement.     Nathan Cheatham, CMA

## 2019-09-09 NOTE — LETTER
9/9/2019       RE: Sara Benjamin  [address]  [place] [facility] [address]     Dear Colleague,    Thank you for referring your patient, aSra Benjamin, to the [facility] DERMATOLOGY at [facility]. Please see a copy of my visit note below.    [facility] Dermatology Note      Dermatology Problem List:  1. Extensive large condyloma acuminata diffusely distributed in the groin and genital area, in context of HIV (VL undetectable; CD4 >1197; on antiretrovirals)              - multiple shave biopsies performed 3/4/19, 4/5/19 on largest lesions (no SCC identified)              - imiquimod started 3/4/19              - s/p extensive cryotherapy 8/12/19, 9/9/19    Encounter Date: Sep 9, 2019    CC:  Chief Complaint   Patient presents with     Derm Problem     Sara is here today for a recheck on genital warts. He says there has been improvement.         History of Present Illness:  Mr. Sara Benjamin is a 49 year old male who presents as a follow-up for condyloma acuminata. The patient was last seen 8/12/19 when he underwent cryotherapy and he continued on imiquimod topically. Today, the patient reports that he has been noticing improvement of the lesion behind his left ear with use of the imiquimod, though it is still persistent. He also notes brown bumps on the scrotum that look like previously treated warts; he has not had the scrotal warts treated. He would like the areas evaluated. He has not been using the imiquimod in the past week on his scrotum or penis as he has noticed significant improvement of the lesions and did not feel it necessary.     Past Medical History:   Patient Active Problem List   Diagnosis     Condyloma acuminata     Past Medical History:   Diagnosis Date     Condyloma acuminata      HIV (human immunodeficiency virus infection) (H)     on Biktarvy     No past surgical history on file.    Social History:  Patient reports that he has quit smoking. He has  never used smokeless tobacco.    Family History:  No family history on file.    Medications:  Current Outpatient Medications   Medication Sig Dispense Refill     bictegravir-emtricitabine-tenofovir (BIKTARVY) -25 MG per tablet Take 1 tablet by mouth daily 30 tablet 11     imiquimod (ALDARA) 5 % external cream Apply topically five times a week 48 packet 3     SENNA-docusate sodium (SENNA S) 8.6-50 MG tablet Take 1 tablet by mouth 2 times daily 60 tablet 11       No Known Allergies    Review of Systems:  -Constitutional: Otherwise feeling well today, in usual state of health.  -HEENT: Patient denies nonhealing oral sores.  -Skin: As above in HPI. No additional skin concerns.    Physical exam:  Vitals: /80 (BP Location: Left arm, Patient Position: Sitting, Cuff Size: Adult Regular)   Pulse 68   GEN: This is a well developed, well-nourished male in no acute distress, in a pleasant mood.    SKIN: Focused examination of the genital area, buttock, and face was performed.  -Granger skin type: IV  -Numerous tan verrucous and smooth papules on the scrotum and base of the penis  -there are multiple hyperpigmented/erythematous patches on the inferior abdomen, penis, and perianal area  -there is a verrucous hyperpigmented plaque in the L postauricular sulcus with prominent honey colored crust and associated peripheral erosions  -No other lesions of concern on areas examined.       Impression/Plan:  1. Extensive and very large condyloma acuminata diffusely distributed on the groin, genital area, and left postauricular sulcus in the context of HIV. Much improved on exam today but clinical appearance concerning for infection of the lesion in the L postauricular sulcus.    We discussed the risks and benefits of treatment with cryotherapy and continued treatment with imiquimod. We also discussed the risks and benefits of treatment with topical mupirocin for the lesion in the L postauricular  sulcus.The patient is  agreeable with this plan.    Cryotherapy procedure note: After verbal consent and discussion of risks and benefits including but no limited to dyspigmentation/scar, blister, and pain,  17 was(were) treated with 1-2mm freeze border for 2 cycles with liquid nitrogen. Post cryotherapy instructions were provided.    Start: mupirocin 2% ointment BID for one week to the lesion in the L postauricular sulcus    Continue imiquimod 5% cream daily five times  a week to the genital area and restart to the L postauricular sulcus after completion of mupirocin course.      CC Bartolo Amor MD  [address] [place] [id number] on close of this encounter.  Follow-up in 6-8 weeks, earlier for new or changing lesions.       Staff Involved:  Scribe/Staff    Scribe Disclosure  I, Dominic Najjar, am serving as a scribe to document services personally performed by Dr. Guillermo Stanley MD, based on data collection and the provider's statements to me.    Provider Disclosure:   The documentation recorded by the scribe accurately reflects the services I personally performed and the decisions made by me.    Guillermo Stanley MD   of Dermatology  Department of Dermatology  [facility] [employer]

## 2019-09-12 ENCOUNTER — TELEPHONE (OUTPATIENT)
Dept: INFECTIOUS DISEASES | Facility: CLINIC | Age: 49
End: 2019-09-12

## 2019-09-13 ENCOUNTER — OFFICE VISIT (OUTPATIENT)
Dept: INFECTIOUS DISEASES | Facility: CLINIC | Age: 49
End: 2019-09-13
Attending: INTERNAL MEDICINE
Payer: COMMERCIAL

## 2019-09-13 VITALS
TEMPERATURE: 98.2 F | SYSTOLIC BLOOD PRESSURE: 118 MMHG | HEART RATE: 70 BPM | WEIGHT: 136.1 LBS | DIASTOLIC BLOOD PRESSURE: 77 MMHG | OXYGEN SATURATION: 100 % | BODY MASS INDEX: 20.69 KG/M2

## 2019-09-13 DIAGNOSIS — Z22.7 LATENT TUBERCULOSIS INFECTION: Primary | ICD-10-CM

## 2019-09-13 PROCEDURE — G0463 HOSPITAL OUTPT CLINIC VISIT: HCPCS | Mod: ZF

## 2019-09-13 ASSESSMENT — PAIN SCALES - GENERAL: PAINLEVEL: NO PAIN (0)

## 2019-09-13 NOTE — PROGRESS NOTES
Infectious Disease Note    Return visit for Latent TB infection    SUBJECTIVE:    Sara Benjamin is a 50 y/o man originally from [place].  We continue to have difficulty completing his I-693 from for immigration purposes.    Sara is seen in the [facility] by civil surgeon Dr. Sammi Valiente.  Phone [phone].  Fax [phone].  They have requested that we evaluate Sara for active TB and provide our documentation to clear him.     However, I have not been able to connect with Dr. Valiente to see what exactly he requires.  An administrative support person named Chyna suggested that I write a letter and fax it to them.  They will then let me know if this is sufficient.    Sara reports doing well and does not have any other concerns.  He does not have cough, SOB, weight loss, fatigue, fevers, chills, or night sweats.    OBJECTIVE:    Current Outpatient Medications   Medication     bictegravir-emtricitabine-tenofovir (BIKTARVY) -25 MG per tablet     imiquimod (ALDARA) 5 % external cream     mupirocin (BACTROBAN) 2 % external ointment     SENNA-docusate sodium (SENNA S) 8.6-50 MG tablet     On exam:  /77 (BP Location: Right arm, Patient Position: Sitting, Cuff Size: Adult Regular)   Pulse 70   Temp 98.2  F (36.8  C)   Wt 61.7 kg (136 lb 1.6 oz)   SpO2 100%   BMI 20.69 kg/m    Appears well.  Lungs are clear to auscultation bilaterally.    Labs:  Reviewed  No new labs    Imagin2019     Chest XR:  with mild fibronodular opacities in the apices. Mild nodularity in the left upper lobe. No focal airspace consolidation, pleural effusion or pneumothorax. Cardiomediastinal silhouette is within normal limits. Visualized portion of the upper abdomen is unremarkable. No acute osseous findings.    ASSESSMENT/PLAN:    Sara has completed treatment for latent tuberculosis.  His chest XR reflects history of old tuberculosis with scarring.  He does not have any signs or symptoms consistent with  active TB.    I wrote a letter to this effect, and faxed it to [facility] today. (see below for letter contents)    Sara has an appointment to return to see me for his routine HIV follow-up on 10/8/19.      Bartolo Amor MD, MS  Infectious Disease    Time:  I spent 15 minutes in direct care with this patient, including >50% of time face-to-face with the patient counseling about his history of TB and the difference between active and latent tuberculosis infection.               [facility] SURGERY [place]        [address]  [address]  [place] [address]  Phone: [phone]  Fax: [phone]         RE:  :  Address: Sara Benjamin  1970  [address] [place] [address]      Sara Benjamin is being seen and cared for at the [facility] Infect[facility] by Dr. Bartolo Amor.     Sara was screened positive for TB:       Date Test   1 2019 Quantiferon TB Gold Plus   2  Quantiferon Gold      Active TB was ruled out due to:       Date Test   1 2019  Chest XR and lack of symptoms      Chest XR:  with mild fibronodular opacities in the apices. Mild nodularity in the left upper lobe. No focal airspace consolidation, pleural effusion or pneumothorax. Cardiomediastinal silhouette is within normal limits. Visualized portion of the upper abdomen is unremarkable. No acute osseous findings.     History:     Sraa first immigrated to the [place] in late  and was tested for TB with a blood test during immigration screening.  He describes being told that he had latent TB, and he received treatment with one pill once daily for 4 months.  He began this in late  and completed in early 2016.     This history is detailed and concordant with an appropriate course of therapy for latent TB with rifampin, one pill once daily for 4 months.     The chest XR is consistent with old tuberculosis lung infection, with subsequent scarring.   Sara does not have any  signs or symptoms consistent with active tuberculosis.     Sara Benjamin is cleared from active tuberculosis infection.     Patient will always have positive result to TB testing therefore this certificate is needed to provide Educational Systems, Health Systems, or Employers proof of patients complete treatment of latent TB.           Bartolo Amor MD Date         _____________________________________       9/13/2019

## 2019-09-13 NOTE — LETTER
Date:September 16, 2019      Patient was self referred, no letter generated. Do not send.        [facility] Information

## 2019-09-13 NOTE — LETTER
RE:  :  Address: Sara Benjamin  1970  [address]  [facility] [address]     Sara Benjamin is being seen and cared for at the [facility] by Dr. Bartolo Amor.    Sara was screened positive for TB:     Date Test   1 2019 Quantiferon TB Gold Plus   2  Quantiferon Gold     Active TB was ruled out due to:     Date Test   1 2019  Chest XR and lack of symptoms     Chest XR:  with mild fibronodular opacities in the apices. Mild nodularity in the left upper lobe. No focal airspace consolidation, pleural effusion or pneumothorax. Cardiomediastinal silhouette is within normal limits. Visualized portion of the upper abdomen is unremarkable. No acute osseous findings.    History:    Sara first immigrated to the [place] in late  and was tested for TB with a blood test during immigration screening.  He describes being told that he had latent TB, and he received treatment with one pill once daily for 4 months.  He began this in late  and completed in early .    This history is detailed and concordant with an appropriate course of therapy for latent TB with rifampin, one pill once daily for 4 months.    The chest XR is consistent with old tuberculosis lung infection, with subsequent scarring.   Sara does not have any signs or symptoms consistent with active tuberculosis.    Sara Benjamin is cleared from active tuberculosis infection.    Patient will always have positive result to TB testing therefore this certificate is needed to provide Educational Systems, Health Systems, or Employers proof of patients complete treatment of latent TB.        Bartolo Amor MD Date       _____________________________________     2019

## 2019-09-13 NOTE — LETTER
2019       RE: Sara Benjamin  [address]  [facility] [address]     Dear Colleague,    Thank you for referring your patient, Sara Benjamin, to the [facility] INFECTIOUS DISEASES at [facility]. Please see a copy of my visit note below.        Infectious Disease Note    Return visit for Latent TB infection    SUBJECTIVE:    Sara Benjamin is a 48 y/o man originally from [place].  We continue to have difficulty completing his I-693 from for immigration purposes.    Sara is seen in the [facility] by civil surgeon Dr. Sammi Valiente.  Phone [phone].  Fax [phone].  They have requested that we evaluate Sara for active TB and provide our documentation to clear him.     However, I have not been able to connect with Dr. Valiente to see what exactly he requires.  An administrative support person named Chyna suggested that I write a letter and fax it to them.  They will then let me know if this is sufficient.    Sara reports doing well and does not have any other concerns.  He does not have cough, SOB, weight loss, fatigue, fevers, chills, or night sweats.    OBJECTIVE:    Current Outpatient Medications   Medication     bictegravir-emtricitabine-tenofovir (BIKTARVY) -25 MG per tablet     imiquimod (ALDARA) 5 % external cream     mupirocin (BACTROBAN) 2 % external ointment     SENNA-docusate sodium (SENNA S) 8.6-50 MG tablet     On exam:  /77 (BP Location: Right arm, Patient Position: Sitting, Cuff Size: Adult Regular)   Pulse 70   Temp 98.2  F (36.8  C)   Wt 61.7 kg (136 lb 1.6 oz)   SpO2 100%   BMI 20.69 kg/m     Appears well.  Lungs are clear to auscultation bilaterally.    Labs:  Reviewed  No new labs    Imagin2019     Chest XR:  with mild fibronodular opacities in the apices. Mild nodularity in the left upper lobe. No focal airspace consolidation, pleural effusion or pneumothorax. Cardiomediastinal silhouette is  within normal limits. Visualized portion of the upper abdomen is unremarkable. No acute osseous findings.    ASSESSMENT/PLAN:    Sara has completed treatment for latent tuberculosis.  His chest XR reflects history of old tuberculosis with scarring.  He does not have any signs or symptoms consistent with active TB.    I wrote a letter to this effect, and faxed it to [facility] today. (see below for letter contents)    Sara has an appointment to return to see me for his routine HIV follow-up on 10/8/19.      Bartolo Amor MD, MS  Infectious Disease    Time:  I spent 15 minutes in direct care with this patient, including >50% of time face-to-face with the patient counseling about his history of TB and the difference between active and latent tuberculosis infection.               [facility] [place]        [address]  [address]  [facility] [address]  Phone: [phone]  Fax: [phone]         RE:  :  Address: Sara Benjamin  1970  [address] [address]      Sara Benjamin is being seen and cared for at the [facility] by Dr. Bartolo Amor.     Sara was screened positive for TB:       Date Test   1 2019 Quantiferon TB Gold Plus   2  Quantiferon Gold      Active TB was ruled out due to:       Date Test   1 2019  Chest XR and lack of symptoms      Chest XR:  with mild fibronodular opacities in the apices. Mild nodularity in the left upper lobe. No focal airspace consolidation, pleural effusion or pneumothorax. Cardiomediastinal silhouette is within normal limits. Visualized portion of the upper abdomen is unremarkable. No acute osseous findings.     History:     Sara first immigrated to the [place] in late  and was tested for TB with a blood test during immigration screening.  He describes being told that he had latent TB, and he received treatment with one pill once daily for 4 months.  He began this in  late 2015 and completed in early 2016.     This history is detailed and concordant with an appropriate course of therapy for latent TB with rifampin, one pill once daily for 4 months.     The chest XR is consistent with old tuberculosis lung infection, with subsequent scarring.   Sara does not have any signs or symptoms consistent with active tuberculosis.     Sara Benjamin is cleared from active tuberculosis infection.     Patient will always have positive result to TB testing therefore this certificate is needed to provide Educational Systems, Health Systems, or Employers proof of patients complete treatment of latent TB.           Bartolo Amor MD Date         _____________________________________       9/13/2019            Again, thank you for allowing me to participate in the care of your patient.      Sincerely,    Bartolo Amor MD

## 2019-09-13 NOTE — NURSING NOTE
Chief Complaint   Patient presents with     RECHECK     follow up     /77 (BP Location: Right arm, Patient Position: Sitting, Cuff Size: Adult Regular)   Pulse 70   Temp 98.2  F (36.8  C)   Wt 61.7 kg (136 lb 1.6 oz)   SpO2 100%   BMI 20.69 kg/m        Barrett Nash, EMT

## 2019-09-13 NOTE — LETTER
2019      RE: Sara Benjamin  [address]  [facility] [address]           Infectious Disease Note    Return visit for Latent TB infection    SUBJECTIVE:    Sara Benjamin is a 50 y/o man originally from [place].  We continue to have difficulty completing his I-693 from for immigration purposes.    Sara is seen in the [facility] by civil surgeon Dr. Sammi Valiente.  Phone [phone].  Fax [phone].  They have requested that we evaluate Sara for active TB and provide our documentation to clear him.     However, I have not been able to connect with Dr. Valiente to see what exactly he requires.  An administrative support person named Chyna suggested that I write a letter and fax it to them.  They will then let me know if this is sufficient.    Sara reports doing well and does not have any other concerns.  He does not have cough, SOB, weight loss, fatigue, fevers, chills, or night sweats.    OBJECTIVE:    Current Outpatient Medications   Medication     bictegravir-emtricitabine-tenofovir (BIKTARVY) -25 MG per tablet     imiquimod (ALDARA) 5 % external cream     mupirocin (BACTROBAN) 2 % external ointment     SENNA-docusate sodium (SENNA S) 8.6-50 MG tablet     On exam:  /77 (BP Location: Right arm, Patient Position: Sitting, Cuff Size: Adult Regular)   Pulse 70   Temp 98.2  F (36.8  C)   Wt 61.7 kg (136 lb 1.6 oz)   SpO2 100%   BMI 20.69 kg/m     Appears well.  Lungs are clear to auscultation bilaterally.    Labs:  Reviewed  No new labs    [email or web address]     Chest XR:  with mild fibronodular opacities in the apices. Mild nodularity in the left upper lobe. No focal airspace consolidation, pleural effusion or pneumothorax. Cardiomediastinal silhouette is within normal limits. Visualized portion of the upper abdomen is unremarkable. No acute osseous findings.    ASSESSMENT/PLAN:    Sara has completed treatment for latent tuberculosis.  His chest XR reflects history of old  tuberculosis with scarring.  He does not have any signs or symptoms consistent with active TB.    I wrote a letter to this effect, and faxed it to [facility] today. (see below for letter contents)    Sara has an appointment to return to see me for his routine HIV follow-up on 10/8/19.      Bartolo Amor MD, MS  Infectious Disease    Time:  I spent 15 minutes in direct care with this patient, including >50% of time face-to-face with the patient counseling about his history of TB and the difference between active and latent tuberculosis infection.               [facility] [place]        [address]  [address]  [facility] [address]  Phone: [phone]  Fax: [phone]         RE:  :  Address: Sara Benjamin  1970  [address]  [facility] [address]      Sara Benjamin is being seen and cared for at the [facility] by Dr. Bartolo Amor.     Sara was screened positive for TB:       Date Test   1 2019 Quantiferon TB Gold Plus   2  Quantiferon Gold      Active TB was ruled out due to:       Date Test   1 2019  Chest XR and lack of symptoms      Chest XR:  with mild fibronodular opacities in the apices. Mild nodularity in the left upper lobe. No focal airspace consolidation, pleural effusion or pneumothorax. Cardiomediastinal silhouette is within normal limits. Visualized portion of the upper abdomen is unremarkable. No acute osseous findings.     History:     Sara first immig[place] to the [place] in late  and was tested for TB with a blood test during immigration screening.  He describes being told that he had latent TB, and he received treatment with one pill once daily for 4 months.  He began this in late  and completed in early 2016.     This history is detailed and concordant with an appropriate course of therapy for latent TB with rifampin, one pill once daily for 4 months.     The chest XR is consistent with  old tuberculosis lung infection, with subsequent scarring.   Sara does not have any signs or symptoms consistent with active tuberculosis.     Sara Benjamin is cleared from active tuberculosis infection.     Patient will always have positive result to TB testing therefore this certificate is needed to provide Educational Systems, Health Systems, or Employers proof of patients complete treatment of latent TB.           Bartolo Amor MD Date         _____________________________________       9/13/2019            Bartolo Amor MD

## 2019-09-13 NOTE — LETTER
Date:September 16, 2019      Patient was self referred, no letter generated. Do not send.        [facility] Information

## 2019-09-19 ENCOUNTER — TELEPHONE (OUTPATIENT)
Dept: INFECTIOUS DISEASES | Facility: CLINIC | Age: 49
End: 2019-09-19

## 2019-09-19 NOTE — TELEPHONE ENCOUNTER
Per message from today from Dr. Amor, immunization letter and chest x-ray from 06/05/2019 faxed to [facility].    Yadi Jamison RN

## 2019-09-19 NOTE — TELEPHONE ENCOUNTER
Infectious Disease Note      I spoke with Nichole from [facility] regarding Sara's immigration screening.      We did not treat him for latent TB.  He was treated in [place] 1550-2630.   She will let me know if they need actual clinic notes from the [place] clinic.  The patient does not recall the name of this clinic, and his HIV provider referred him out for this service.  However, we do have records from his HIV provider and can reach out to them for the name of the other clinic if needed.  This might take some time.      Otherwise we will fax them the immunization letter I wrote 6/5/2019 and the Chest XR report from 6/5/2019.  Fax number is [phone].      Bartolo Amor MD, MS  Infectious Disease

## 2019-09-19 NOTE — TELEPHONE ENCOUNTER
Health Call Center    Phone Message    May a detailed message be left on voicemail: yes    Reason for Call: Other: Nichole from the [facility] is calling wanting to know from Dr. Bartolo Amor, If she treated pt for laten TB or saw the Records for TB? IF treated pt for Laten TB when was pt started on rifadin and when stopped taking the medication. Nichole is also needing to have immunization and chest ex-ray copy sent to them. PLease call Nichole back today as Dr is there today if possible, Thank you     Action Taken: Message routed to:  [facility] ([facility] Infectious [place]

## 2019-10-08 ENCOUNTER — OFFICE VISIT (OUTPATIENT)
Dept: INFECTIOUS DISEASES | Facility: CLINIC | Age: 49
End: 2019-10-08
Attending: INTERNAL MEDICINE
Payer: COMMERCIAL

## 2019-10-08 VITALS
WEIGHT: 136.4 LBS | DIASTOLIC BLOOD PRESSURE: 69 MMHG | SYSTOLIC BLOOD PRESSURE: 115 MMHG | OXYGEN SATURATION: 96 % | TEMPERATURE: 97.7 F | HEART RATE: 67 BPM | BODY MASS INDEX: 20.74 KG/M2

## 2019-10-08 DIAGNOSIS — B20 HUMAN IMMUNODEFICIENCY VIRUS (HIV) DISEASE (H): ICD-10-CM

## 2019-10-08 DIAGNOSIS — B20 HUMAN IMMUNODEFICIENCY VIRUS (HIV) DISEASE (H): Primary | ICD-10-CM

## 2019-10-08 DIAGNOSIS — Z23 NEED FOR INFLUENZA VACCINATION: ICD-10-CM

## 2019-10-08 LAB
ALBUMIN SERPL-MCNC: 4.1 G/DL (ref 3.4–5)
ALP SERPL-CCNC: 63 U/L (ref 40–150)
ALT SERPL W P-5'-P-CCNC: 29 U/L (ref 0–70)
ANION GAP SERPL CALCULATED.3IONS-SCNC: 5 MMOL/L (ref 3–14)
AST SERPL W P-5'-P-CCNC: 18 U/L (ref 0–45)
BASOPHILS # BLD AUTO: 0.1 10E9/L (ref 0–0.2)
BASOPHILS NFR BLD AUTO: 0.7 %
BILIRUB SERPL-MCNC: 0.4 MG/DL (ref 0.2–1.3)
BUN SERPL-MCNC: 19 MG/DL (ref 7–30)
CALCIUM SERPL-MCNC: 9.5 MG/DL (ref 8.5–10.1)
CHLORIDE SERPL-SCNC: 108 MMOL/L (ref 94–109)
CHOLEST SERPL-MCNC: 139 MG/DL
CO2 SERPL-SCNC: 28 MMOL/L (ref 20–32)
CREAT SERPL-MCNC: 1 MG/DL (ref 0.66–1.25)
DIFFERENTIAL METHOD BLD: NORMAL
EOSINOPHIL # BLD AUTO: 0.1 10E9/L (ref 0–0.7)
EOSINOPHIL NFR BLD AUTO: 0.7 %
ERYTHROCYTE [DISTWIDTH] IN BLOOD BY AUTOMATED COUNT: 13.4 % (ref 10–15)
GFR SERPL CREATININE-BSD FRML MDRD: 87 ML/MIN/{1.73_M2}
GLUCOSE SERPL-MCNC: 81 MG/DL (ref 70–99)
HCT VFR BLD AUTO: 44.8 % (ref 40–53)
HDLC SERPL-MCNC: 31 MG/DL
HGB BLD-MCNC: 15.2 G/DL (ref 13.3–17.7)
IMM GRANULOCYTES # BLD: 0 10E9/L (ref 0–0.4)
IMM GRANULOCYTES NFR BLD: 0.3 %
LDLC SERPL CALC-MCNC: 68 MG/DL
LYMPHOCYTES # BLD AUTO: 2.6 10E9/L (ref 0.8–5.3)
LYMPHOCYTES NFR BLD AUTO: 38 %
MCH RBC QN AUTO: 30.8 PG (ref 26.5–33)
MCHC RBC AUTO-ENTMCNC: 33.9 G/DL (ref 31.5–36.5)
MCV RBC AUTO: 91 FL (ref 78–100)
MONOCYTES # BLD AUTO: 0.4 10E9/L (ref 0–1.3)
MONOCYTES NFR BLD AUTO: 5.6 %
NEUTROPHILS # BLD AUTO: 3.8 10E9/L (ref 1.6–8.3)
NEUTROPHILS NFR BLD AUTO: 54.7 %
NONHDLC SERPL-MCNC: 108 MG/DL
NRBC # BLD AUTO: 0 10*3/UL
NRBC BLD AUTO-RTO: 0 /100
PLATELET # BLD AUTO: 209 10E9/L (ref 150–450)
POTASSIUM SERPL-SCNC: 3.8 MMOL/L (ref 3.4–5.3)
PROT SERPL-MCNC: 7.8 G/DL (ref 6.8–8.8)
RBC # BLD AUTO: 4.93 10E12/L (ref 4.4–5.9)
SODIUM SERPL-SCNC: 142 MMOL/L (ref 133–144)
TRIGL SERPL-MCNC: 198 MG/DL
WBC # BLD AUTO: 6.9 10E9/L (ref 4–11)

## 2019-10-08 PROCEDURE — 80061 LIPID PANEL: CPT | Performed by: INTERNAL MEDICINE

## 2019-10-08 PROCEDURE — 86359 T CELLS TOTAL COUNT: CPT | Performed by: INTERNAL MEDICINE

## 2019-10-08 PROCEDURE — G0463 HOSPITAL OUTPT CLINIC VISIT: HCPCS | Mod: 25,ZF

## 2019-10-08 PROCEDURE — 85025 COMPLETE CBC W/AUTO DIFF WBC: CPT | Performed by: INTERNAL MEDICINE

## 2019-10-08 PROCEDURE — 25000128 H RX IP 250 OP 636: Mod: ZF | Performed by: INTERNAL MEDICINE

## 2019-10-08 PROCEDURE — 87536 HIV-1 QUANT&REVRSE TRNSCRPJ: CPT | Performed by: INTERNAL MEDICINE

## 2019-10-08 PROCEDURE — 36415 COLL VENOUS BLD VENIPUNCTURE: CPT | Performed by: INTERNAL MEDICINE

## 2019-10-08 PROCEDURE — 86360 T CELL ABSOLUTE COUNT/RATIO: CPT | Performed by: INTERNAL MEDICINE

## 2019-10-08 PROCEDURE — 90682 RIV4 VACC RECOMBINANT DNA IM: CPT | Mod: ZF | Performed by: INTERNAL MEDICINE

## 2019-10-08 PROCEDURE — 80053 COMPREHEN METABOLIC PANEL: CPT | Performed by: INTERNAL MEDICINE

## 2019-10-08 PROCEDURE — G0008 ADMIN INFLUENZA VIRUS VAC: HCPCS | Mod: ZF

## 2019-10-08 RX ADMIN — INFLUENZA A VIRUS A/BRISBANE/02/2018 (H1N1) RECOMBINANT HEMAGGLUTININ ANTIGEN, INFLUENZA A VIRUS A/KANSAS/14/2017 (H3N2) RECOMBINANT HEMAGGLUTININ ANTIGEN, INFLUENZA B VIRUS B/PHUKET/3073/2013 RECOMBINANT HEMAGGLUTININ ANTIGEN, AND INFLUENZA B VIRUS B/MARYLAND/15/2016 RECOMBINANT HEMAGGLUTININ ANTIGEN 0.5 ML: 45; 45; 45; 45 INJECTION INTRAMUSCULAR at 15:22

## 2019-10-08 ASSESSMENT — PAIN SCALES - GENERAL: PAINLEVEL: NO PAIN (0)

## 2019-10-08 NOTE — NURSING NOTE
Chief Complaint   Patient presents with     RECHECK     F/U b20     Blood pressure 115/69, pulse 67, temperature 97.7  F (36.5  C), temperature source Oral, weight 61.9 kg (136 lb 6.4 oz), SpO2 96 %.    Betty Ramachandran, CMA

## 2019-10-08 NOTE — LETTER
10/8/2019      RE: Sara Benjamin  [address]  [facility] [address]         INFECTIOUS DISEASE CLINIC    REASON FOR VISIT:   Routine follow-up for HIV    SUBJECTIVE:    Sara Benjamin is a 48 y/o man originally from [facility].   He was most recently in [place], [place] and moved to [place] recently.  He first established care with me for HIV on 2/4/2019.   A professional  is used for this encounter.  The language is Tigrinya.    7/9/2019 - last absolute CD4 was 1197 with an undetectable HIV viral load.  Sara has been closely adherent to his HIV medication, which is Biktarvy  (tenofovir alafenamide/emtricitabine/bictegravir).  He switched to this regimen as of our first appointment 2/4/2019 and has tolerated it easily without any noted adverse effects.  He reports easy daily adherence.     Sara denies any interval sexual activity since our last visit, and therefore declines any sexually transmitted infection screening tests for today.    Sara does not have any concerns or questions today.  I asked him about his constipation.  He tells me that he still experiences this occasionally.  He has not refilled his senna/docusate.    He is most intent on completing his immigration paperwork to obtain his Green Card.  We have had a very difficult time finding all of the records necessary for this application.   See Past Medical History below for complete description of history of both active and latent TB treatment.  We received source documentation only today from the [facility].      Past Medical History:    Sara reports first learning of his HIV diagnosis about 18 years ago in [facility].  Both he and his wife tested positive.  He did not start treatment right away, but was given medications eventually when his wife was pregnant for this first time. (The eldest child is 11 years old). He reports that he and his wife took the same medicine.  He cannot confirm, but from his  description it seems most likely that this was Atripla, or the equivalent as dispensed in [facility].  He also remembers taking another medicine for awhile, and then he could stop that one. Again, he cannot confirm, but this sounds like Bactrim.  He reports being treated for active TB once in [place], and another time for a pneumonia.  He does not recall exact dates, and we do not have this documentation.  Other than that he does not recall any significant illnesses.  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    He came to the [facility] via [place] first four years ago.  He has a brother in [place] and came over for work opportunities.  His wife and three children (aged 11, 8, and 6) have not moved yet, but he hopes to bring them soon.  Wife took medications during each pregnancy and the children were not infected with HIV.       Sara was treated for latent TB while in [place].  I will scan the source documentation into EPIC that we received today 10/8/2019, and will also fax this to the civil surgeon's office to complete his immigration paperwork.    Clinic:  [facility]  Dates of treatment:  7/22/2015 through 11/18/2015  (4 months)  Medication:  rifampin 600mg po daily   4/9/2015  Quantiferon Gold  Positive  4/20/2015  Chest XR - no signs of active TB  Three sputums were negative for AFB growth, 4/29/2015, 5/3/2015, 5/5/2015    Patient presented to me 2/4/2019 on Descovy (tenofovir alafenamide/emtricitabine) and dolutegravir. He reported taking this regimen for the previous 1 year.  He was tolerating well and reported close adherence.  We switched to Biktarvy then to simplify the regimen to a single tablet daily.  and reports not missing any doses.    OBJECTIVE:    Current Outpatient Medications   Medication     bictegravir-emtricitabine-tenofovir (BIKTARVY) -25 MG per tablet     imiquimod (ALDARA)  5 % external cream     mupirocin (BACTROBAN) 2 % external ointment     SENNA-docusate sodium (SENNA S) 8.6-50 MG tablet      Allergies:  No Known Allergies    Physical Examination:    VITAL SIGNS: /69   Pulse 67   Temp 97.7  F (36.5  C) (Oral)   Wt 61.9 kg (136 lb 6.4 oz)   SpO2 96%   BMI 20.74 kg/m     GENERAL:   No acute distress, thin but not cachectic   HEENT: No icterus or injection. Oropharynx moist and clear without lesions or exudate.  Poor dentition, several caries and missing teeth    NECK: Supple and nontender  LYMPH:  Some shotty cervical lymphadenopathy which is non-tender and freely moveable, no axillary or inguinal lymphadenopathy  LUNGS: Clear to ausculation bilaterally without any increased work of breathing  HEART: Regular rate and rhythm and no murmur, gallop or rub    ABDOMEN: Normoactive bowel sounds, soft, nontender, nondistended, no hepatosplenomegaly.    /GYN:  extensive large condyloma acuminata throughout groin and genital area  EXTREMITIES: Warm and well perfused without clubbing, cyanosis, or edema  SKIN:  No other skin lesions or rashes  NEURO:  Awake, alert, no focal neurologic deficits.  PSYCH: Affect normal. Speech fluent and appropriate.     Laboratory Data:    The following labs were performed after the last ID provider visit 7/9/2019.  New labs will be ordered for today.    Orders Only on 07/09/2019   Component Date Value Ref Range Status     Sodium 07/09/2019 139  133 - 144 mmol/L Final     Potassium 07/09/2019 3.7  3.4 - 5.3 mmol/L Final     Chloride 07/09/2019 110* 94 - 109 mmol/L Final     Carbon Dioxide 07/09/2019 24  20 - 32 mmol/L Final     Anion Gap 07/09/2019 5  3 - 14 mmol/L Final     Glucose 07/09/2019 81  70 - 99 mg/dL Final     Urea Nitrogen 07/09/2019 13  7 - 30 mg/dL Final     Creatinine 07/09/2019 0.79  0.66 - 1.25 mg/dL Final     GFR Estimate 07/09/2019 >90  >60 mL/min/[1.73_m2] Final     GFR Estimate If Black 07/09/2019 >90  >60 mL/min/[1.73_m2] Final      Calcium 07/09/2019 8.7  8.5 - 10.1 mg/dL Final     Bilirubin Total 07/09/2019 0.3  0.2 - 1.3 mg/dL Final     Albumin 07/09/2019 3.8  3.4 - 5.0 g/dL Final     Protein Total 07/09/2019 7.0  6.8 - 8.8 g/dL Final     Alkaline Phosphatase 07/09/2019 61  40 - 150 U/L Final     ALT 07/09/2019 24  0 - 70 U/L Final     AST 07/09/2019 15  0 - 45 U/L Final     WBC 07/09/2019 4.6  4.0 - 11.0 10e9/L Final     RBC Count 07/09/2019 4.40  4.4 - 5.9 10e12/L Final     Hemoglobin 07/09/2019 13.1* 13.3 - 17.7 g/dL Final     Hematocrit 07/09/2019 38.5* 40.0 - 53.0 % Final     MCV 07/09/2019 88  78 - 100 fl Final     MCH 07/09/2019 29.8  26.5 - 33.0 pg Final     MCHC 07/09/2019 34.0  31.5 - 36.5 g/dL Final     RDW 07/09/2019 12.4  10.0 - 15.0 % Final     Platelet Count 07/09/2019 175  150 - 450 10e9/L Final     Diff Method 07/09/2019 Auto  Final     % Neutrophils 07/09/2019 39.3  % Final     % Lymphocytes 07/09/2019 51.9  % Final     % Monocytes 07/09/2019 7.7  % Final     % Eosinophils 07/09/2019 0.7  % Final     % Basophils 07/09/2019 0.4  % Final     % Immature Granulocytes 07/09/2019 0.0  % Final     Nucleated RBCs 07/09/2019 0  0 /100 Final     Absolute Neutrophil 07/09/2019 1.8  1.6 - 8.3 10e9/L Final     Absolute Lymphocytes 07/09/2019 2.4  0.8 - 5.3 10e9/L Final     Absolute Monocytes 07/09/2019 0.4  0.0 - 1.3 10e9/L Final     Absolute Eosinophils 07/09/2019 0.0  0.0 - 0.7 10e9/L Final     Absolute Basophils 07/09/2019 0.0  0.0 - 0.2 10e9/L Final     Abs Immature Granulocytes 07/09/2019 0.0  0 - 0.4 10e9/L Final     Absolute Nucleated RBC 07/09/2019 0.0   Final     IFC Specimen 07/09/2019 Blood   Final     CD3 Mature T 07/09/2019 83  49 - 84 % Final     CD4 Barton T 07/09/2019 51  28 - 63 % Final     CD8 Suppressor T 07/09/2019 28  10 - 40 % Final     CD4:CD8 Ratio 07/09/2019 1.82  1.40 - 2.60 Final     Absolute CD3 07/09/2019 1,924  603 - 2,990 cells/uL Final     Absolute CD4 07/09/2019 1,197  441 - 2,156 cells/uL Final      Absolute CD8 07/09/2019 663  125 - 1,312 cells/uL Final     HIV-1 RNA Quant Result 07/09/2019 HIV-1 RNA Not Detected  HIVND^HIV-1 RNA Not Detected [Copies]/mL Final     HIV RNA QT Log 07/09/2019 Not Calculated  <1.3 [Log_copies]/mL Final         ASSESSMENT AND PLAN:       1. HIV.    Sara is doing very well on once daily single tablet Biktarvy.  (tenofovir alafenamide/emtricitabine/bictegravir).  He has never missed an appointment and is appropriately engaged in care.  He reports easy daily adherence.  We will continue Biktarvy.    Routine monitoring labs are ordered for today.   Lipid profile will be non-fasting.  Orders Placed This Encounter   Procedures     Comprehensive metabolic panel     CBC with platelets differential     HIV-1 RNA quantitative     T cell subset profile     Lipid Profile     2.  Large condyloma acuminata of the groin and genital regions - very extensive.  Patient is followed by the [facility].  His next appointment is scheduled for 11/4/2019.    3.  Occasional constipation.  Patient will  a refill today for senna/docusate. I have reminded him that he can use this as needed to aid in regular bowel movements.    4.   Seasonal influenza vaccine was administered today.  Otherwise, patient is up to date for appropriate vaccinations.      Follow-up:  Return in 3 months for routine HIV care.      Bartolo Amor MD, MS    Infectious Disease      Time:  I spent 30 minutes in direct care with this patient, including >50% of time face-to-face with the patient in counseling.    Bartolo Amor MD

## 2019-10-08 NOTE — LETTER
Sara Benjamin.     2019.      Medication List:    Current Outpatient Medications   Medication     bictegravir-emtricitabine-tenofovir (BIKTARVY) -25 MG per tablet     imiquimod (ALDARA) 5 % external cream     mupirocin (BACTROBAN) 2 % external ointment     SENNA-docusate sodium (SENNA S) 8.6-50 MG tablet

## 2019-10-08 NOTE — LETTER
Date:October 9, 2019      Patient was self referred, no letter generated. Do not send.        [facility] Health Information

## 2019-10-08 NOTE — PROGRESS NOTES
INFECTIOUS DISEASE CLINIC    REASON FOR VISIT:   Routine follow-up for HIV    SUBJECTIVE:    Sara Benjamin is a 48 y/o man originally from [facility].   He was most recently in [place], [place] and moved to [place] recently.  He first established care with me for HIV on 2/4/2019.   A professional  is used for this encounter.  The language is Tigrinya.    7/9/2019 - last absolute CD4 was 1197 with an undetectable HIV viral load.  Sara has been closely adherent to his HIV medication, which is Biktarvy  (tenofovir alafenamide/emtricitabine/bictegravir).  He switched to this regimen as of our first appointment 2/4/2019 and has tolerated it easily without any noted adverse effects.  He reports easy daily adherence.     Sara denies any interval sexual activity since our last visit, and therefore declines any sexually transmitted infection screening tests for today.    Sara does not have any concerns or questions today.  I asked him about his constipation.  He tells me that he still experiences this occasionally.  He has not refilled his senna/docusate.    He is most intent on completing his immigration paperwork to obtain his Green Card.  We have had a very difficult time finding all of the records necessary for this application.   See Past Medical History below for complete description of history of both active and latent TB treatment.  We received source documentation only today from the [facility].      Past Medical History:    Sara reports first learning of his HIV diagnosis about 18 years ago in [facility].  Both he and his wife tested positive.  He did not start treatment right away, but was given medications eventually when his wife was pregnant for this first time. (The eldest child is 11 years old). He reports that he and his wife took the same medicine.  He cannot confirm, but from his description it seems most likely that this was Atripla, or the equivalent as dispensed  in [facility].  He also remembers taking another medicine for awhile, and then he could stop that one. Again, he cannot confirm, but this sounds like Bactrim.  He reports being treated for active TB once in [place], and another time for a pneumonia.  He does not recall exact dates, and we do not have this documentation.  Other than that he does not recall any significant illnesses.  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    He came to the [facility] via [place], [place] first four years ago.  He has a brother in [place] and came over for work opportunities.  His wife and three children (aged 11, 8, and 6) have not moved yet, but he hopes to bring them soon.  Wife took medications during each pregnancy and the children were not infected with HIV.       Sara was treated for latent TB while in [place], [place].  I will scan the source documentation into EPIC that we received today 10/8/2019, and will also fax this to the civil surgeon's office to complete his immigration paperwork.    Clinic:  [facility]  Dates of treatment:  7/22/2015 through 11/18/2015  (4 months)  Medication:  rifampin 600mg po daily   4/9/2015  Quantiferon Gold  Positive  4/20/2015  Chest XR - no signs of active TB  Three sputums were negative for AFB growth, 4/29/2015, 5/3/2015, 5/5/2015    Patient presented to me 2/4/2019 on Descovy (tenofovir alafenamide/emtricitabine) and dolutegravir. He reported taking this regimen for the previous 1 year.  He was tolerating well and reported close adherence.  We switched to Biktarvy then to simplify the regimen to a single tablet daily.  and reports not missing any doses.    OBJECTIVE:    Current Outpatient Medications   Medication     bictegravir-emtricitabine-tenofovir (BIKTARVY) -25 MG per tablet     imiquimod (ALDARA) 5 % external cream     mupirocin (BACTROBAN) 2 % external ointment     SENNA-docusate  sodium (SENNA S) 8.6-50 MG tablet      Allergies:  No Known Allergies    Physical Examination:    VITAL SIGNS: /69   Pulse 67   Temp 97.7  F (36.5  C) (Oral)   Wt 61.9 kg (136 lb 6.4 oz)   SpO2 96%   BMI 20.74 kg/m    GENERAL:   No acute distress, thin but not cachectic   HEENT: No icterus or injection. Oropharynx moist and clear without lesions or exudate.  Poor dentition, several caries and missing teeth    NECK: Supple and nontender  LYMPH:  Some shotty cervical lymphadenopathy which is non-tender and freely moveable, no axillary or inguinal lymphadenopathy  LUNGS: Clear to ausculation bilaterally without any increased work of breathing  HEART: Regular rate and rhythm and no murmur, gallop or rub    ABDOMEN: Normoactive bowel sounds, soft, nontender, nondistended, no hepatosplenomegaly.    /GYN:  extensive large condyloma acuminata throughout groin and genital area  EXTREMITIES: Warm and well perfused without clubbing, cyanosis, or edema  SKIN:  No other skin lesions or rashes  NEURO:  Awake, alert, no focal neurologic deficits.  PSYCH: Affect normal. Speech fluent and appropriate.     Laboratory Data:    The following labs were performed after the last ID provider visit 7/9/2019.  New labs will be ordered for today.    Orders Only on 07/09/2019   Component Date Value Ref Range Status     Sodium 07/09/2019 139  133 - 144 mmol/L Final     Potassium 07/09/2019 3.7  3.4 - 5.3 mmol/L Final     Chloride 07/09/2019 110* 94 - 109 mmol/L Final     Carbon Dioxide 07/09/2019 24  20 - 32 mmol/L Final     Anion Gap 07/09/2019 5  3 - 14 mmol/L Final     Glucose 07/09/2019 81  70 - 99 mg/dL Final     Urea Nitrogen 07/09/2019 13  7 - 30 mg/dL Final     Creatinine 07/09/2019 0.79  0.66 - 1.25 mg/dL Final     GFR Estimate 07/09/2019 >90  >60 mL/min/[1.73_m2] Final     GFR Estimate If Black 07/09/2019 >90  >60 mL/min/[1.73_m2] Final     Calcium 07/09/2019 8.7  8.5 - 10.1 mg/dL Final     Bilirubin Total 07/09/2019 0.3   0.2 - 1.3 mg/dL Final     Albumin 07/09/2019 3.8  3.4 - 5.0 g/dL Final     Protein Total 07/09/2019 7.0  6.8 - 8.8 g/dL Final     Alkaline Phosphatase 07/09/2019 61  40 - 150 U/L Final     ALT 07/09/2019 24  0 - 70 U/L Final     AST 07/09/2019 15  0 - 45 U/L Final     WBC 07/09/2019 4.6  4.0 - 11.0 10e9/L Final     RBC Count 07/09/2019 4.40  4.4 - 5.9 10e12/L Final     Hemoglobin 07/09/2019 13.1* 13.3 - 17.7 g/dL Final     Hematocrit 07/09/2019 38.5* 40.0 - 53.0 % Final     MCV 07/09/2019 88  78 - 100 fl Final     MCH 07/09/2019 29.8  26.5 - 33.0 pg Final     MCHC 07/09/2019 34.0  31.5 - 36.5 g/dL Final     RDW 07/09/2019 12.4  10.0 - 15.0 % Final     Platelet Count 07/09/2019 175  150 - 450 10e9/L Final     Diff Method 07/09/2019 Auto  Final     % Neutrophils 07/09/2019 39.3  % Final     % Lymphocytes 07/09/2019 51.9  % Final     % Monocytes 07/09/2019 7.7  % Final     % Eosinophils 07/09/2019 0.7  % Final     % Basophils 07/09/2019 0.4  % Final     % Immature Granulocytes 07/09/2019 0.0  % Final     Nucleated RBCs 07/09/2019 0  0 /100 Final     Absolute Neutrophil 07/09/2019 1.8  1.6 - 8.3 10e9/L Final     Absolute Lymphocytes 07/09/2019 2.4  0.8 - 5.3 10e9/L Final     Absolute Monocytes 07/09/2019 0.4  0.0 - 1.3 10e9/L Final     Absolute Eosinophils 07/09/2019 0.0  0.0 - 0.7 10e9/L Final     Absolute Basophils 07/09/2019 0.0  0.0 - 0.2 10e9/L Final     Abs Immature Granulocytes 07/09/2019 0.0  0 - 0.4 10e9/L Final     Absolute Nucleated RBC 07/09/2019 0.0   Final     IFC Specimen 07/09/2019 Blood   Final     CD3 Mature T 07/09/2019 83  49 - 84 % Final     CD4 [place] T 07/09/2019 51  28 - 63 % Final     CD8 Suppressor T 07/09/2019 28  10 - 40 % Final     CD4:CD8 Ratio 07/09/2019 1.82  1.40 - 2.60 Final     Absolute CD3 07/09/2019 1,924  603 - 2,990 cells/uL Final     Absolute CD4 07/09/2019 1,197  441 - 2,156 cells/uL Final     Absolute CD8 07/09/2019 663  125 - 1,312 cells/uL Final     HIV-1 RNA Quant Result  07/09/2019 HIV-1 RNA Not Detected  HIVND^HIV-1 RNA Not Detected [Copies]/mL Final     HIV RNA QT Log 07/09/2019 Not Calculated  <1.3 [Log_copies]/mL Final         ASSESSMENT AND PLAN:       1. HIV.    Sara is doing very well on once daily single tablet Biktarvy.  (tenofovir alafenamide/emtricitabine/bictegravir).  He has never missed an appointment and is appropriately engaged in care.  He reports easy daily adherence.  We will continue Biktarvy.    Routine monitoring labs are ordered for today.   Lipid profile will be non-fasting.  Orders Placed This Encounter   Procedures     Comprehensive metabolic panel     CBC with platelets differential     HIV-1 RNA quantitative     T cell subset profile     Lipid Profile     2.  Large condyloma acuminata of the groin and genital regions - very extensive.  Patient is followed by the [facility].  His next appointment is scheduled for 11/4/2019.    3.  Occasional constipation.  Patient will  a refill today for senna/docusate. I have reminded him that he can use this as needed to aid in regular bowel movements.    4.   Seasonal influenza vaccine was administered today.  Otherwise, patient is up to date for appropriate vaccinations.      Follow-up:  Return in 3 months for routine HIV care.      Bartolo Amor MD, MS    Infectious Disease      Time:  I spent 30 minutes in direct care with this patient, including >50% of time face-to-face with the patient in counseling.

## 2019-10-09 LAB
CD3 CELLS # BLD: 2146 CELLS/UL (ref 603–2990)
CD3 CELLS NFR BLD: 80 % (ref 49–84)
CD3+CD4+ CELLS # BLD: 1287 CELLS/UL (ref 441–2156)
CD3+CD4+ CELLS NFR BLD: 48 % (ref 28–63)
CD3+CD4+ CELLS/CD3+CD8+ CLL BLD: 1.66 % (ref 1.4–2.6)
CD3+CD8+ CELLS # BLD: 788 CELLS/UL (ref 125–1312)
CD3+CD8+ CELLS NFR BLD: 29 % (ref 10–40)
IFC SPECIMEN: NORMAL

## 2019-11-04 ENCOUNTER — OFFICE VISIT (OUTPATIENT)
Dept: DERMATOLOGY | Facility: CLINIC | Age: 49
End: 2019-11-04
Payer: COMMERCIAL

## 2019-11-04 VITALS — SYSTOLIC BLOOD PRESSURE: 125 MMHG | HEART RATE: 69 BPM | DIASTOLIC BLOOD PRESSURE: 76 MMHG

## 2019-11-04 DIAGNOSIS — A63.0 CONDYLOMA ACUMINATA: Primary | ICD-10-CM

## 2019-11-04 DIAGNOSIS — Z21 ASYMPTOMATIC HIV INFECTION (H): ICD-10-CM

## 2019-11-04 RX ORDER — IMIQUIMOD 12.5 MG/.25G
CREAM TOPICAL
Qty: 48 PACKET | Refills: 5 | Status: SHIPPED | OUTPATIENT
Start: 2019-11-04 | End: 2020-01-15

## 2019-11-04 ASSESSMENT — PAIN SCALES - GENERAL: PAINLEVEL: NO PAIN (0)

## 2019-11-04 NOTE — LETTER
11/4/2019       RE: Sara Benjamin  [address]  [facility] [address]     Dear Colleague,    Thank you for referring your patient, Sara Benjamin, to the [facility] at [facility]. Please see a copy of my visit note below.    [facility] Dermatology Note      Dermatology Problem List:  1. Extensive large condyloma acuminata diffusely distributed in the groin and genital area, in context of HIV (VL undetectable; CD4 >1197; on antiretrovirals)              - multiple shave biopsies performed 3/4/19, 4/5/19 on largest lesions (no SCC identified)              - imiquimod started 3/4/19              - s/p extensive cryotherapy 8/12/19, 9/9/19, 11/4/19    Encounter Date: Nov 4, 2019    CC:  Chief Complaint   Patient presents with     Derm Problem     Sara is here today for a wart follow up. No changes.          History of Present Illness:  Mr. Sara Benjamin is a 49 year old male who presents as a follow-up for warts. The patient was last seen 9/9/19 when he underwent cryotherapy and he continued on imiquimod topically. At that time, he also started mupirocin ointment topically for the lesion in his left postauricular sulcus. Today, the patient repots that he has been noticing improvement in the lesions behind his left ear as well as the warts on his lower abdomen and genital area. He has been using the topical imiquimod with relief, though he notes that he has run out since his last visit. Has not been using this on the genital area though. The patient voices no other concerns.    Past Medical History:   Patient Active Problem List   Diagnosis     Condyloma acuminata     Past Medical History:   Diagnosis Date     Condyloma acuminata      HIV (human immunodeficiency virus infection) (H)     on Biktarvy     No past surgical history on file.    Social History:  Patient reports that he has quit smoking. He has never used smokeless tobacco. He reports previous  alcohol use.    Family History:  No family history on file.    Medications:  Current Outpatient Medications   Medication Sig Dispense Refill     bictegravir-emtricitabine-tenofovir (BIKTARVY) -25 MG per tablet Take 1 tablet by mouth daily 30 tablet 11     imiquimod (ALDARA) 5 % external cream Apply topically five times a week 48 packet 3     mupirocin (BACTROBAN) 2 % external ointment Use 2 times a day to affected area behind ear for 1 week 30 g 1     SENNA-docusate sodium (SENNA S) 8.6-50 MG tablet Take 1 tablet by mouth 2 times daily (Patient not taking: Reported on 9/13/2019) 60 tablet 11       No Known Allergies    Review of Systems:  -Constitutional: Otherwise feeling well today, in usual state of health.  -HEENT: Patient denies nonhealing oral sores.  -Skin: As above in HPI. No additional skin concerns.    Physical exam:  Vitals: /76   Pulse 69   GEN: This is a well developed, well-nourished male in no acute distress, in a pleasant mood.    SKIN: Focused examination of the genital area, buttock, and face was performed.  -Granger skin type: IV  -there are numerous verrucous/polyploid papules and plaques on the scrotum, shaft of the penis, and R crural fold   -hyperpigmented/erythematous patches on the inferior abdomen, penis, and perianal area with interval improvement  -there is a verrucous hyperpigmented plaque in the L postauricular sulcus with marked improvement   -No other lesions of concern on areas examined.       Impression/Plan:  1. Extensive and very large condyloma acuminata diffusely distributed on the groin, genital area, and left postauricular sulcus in the context of HIV. Much improved on exam today     We discussed the risks and benefits of treatment with cryotherapy and continued treatment with imiquimod. The patient is agreeable with this plan.    Cryotherapy procedure note: After verbal consent and discussion of risks and benefits including but no limited to  dyspigmentation/scar, blister, and pain, 18 were treated with 1-2mm freeze border for 2 cycles with liquid nitrogen. Post cryotherapy instructions were provided.    Discontinue: mupirocin 2% ointment BID for one week to the lesion in the L postauricular sulcus    Restart imiquimod 5% cream daily five times a week       JULIETTE Amor MD  [address] [place] [phone] on close of this encounter.  Follow-up in 4 weeks, earlier for new or changing lesions.       Staff Involved:  Scribe/Staff      Provider Disclosure:   The documentation recorded by the scribe accurately reflects the services I personally performed and the decisions made by me.    Guillermo Stanley MD   of Dermatology  Department of Dermatology  [facility] [employer] [facility]

## 2019-11-04 NOTE — NURSING NOTE
Dermatology Rooming Note    Sara Benjamin's goals for this visit include:   Chief Complaint   Patient presents with     Derm Problem     Sara is here today for a wart follow up. No changes.      JERMAIN Mclean

## 2019-11-04 NOTE — PROGRESS NOTES
[facility] Dermatology Note      Dermatology Problem List:  1. Extensive large condyloma acuminata diffusely distributed in the groin and genital area, in context of HIV (VL undetectable; CD4 >1197; on antiretrovirals)              - multiple shave biopsies performed 3/4/19, 4/5/19 on largest lesions (no SCC identified)              - imiquimod started 3/4/19              - s/p extensive cryotherapy 8/12/19, 9/9/19, 11/4/19    Encounter Date: Nov 4, 2019    CC:  Chief Complaint   Patient presents with     Derm Problem     Sara is here today for a wart follow up. No changes.          History of Present Illness:  Mr. Sara Benjamin is a 49 year old male who presents as a follow-up for warts. The patient was last seen 9/9/19 when he underwent cryotherapy and he continued on imiquimod topically. At that time, he also started mupirocin ointment topically for the lesion in his left postauricular sulcus. Today, the patient repots that he has been noticing improvement in the lesions behind his left ear as well as the warts on his lower abdomen and genital area. He has been using the topical imiquimod with relief, though he notes that he has run out since his last visit. Has not been using this on the genital area though. The patient voices no other concerns.    Past Medical History:   Patient Active Problem List   Diagnosis     Condyloma acuminata     Past Medical History:   Diagnosis Date     Condyloma acuminata      HIV (human immunodeficiency virus infection) (H)     on Biktarvy     No past surgical history on file.    Social History:  Patient reports that he has quit smoking. He has never used smokeless tobacco. He reports previous alcohol use.    Family History:  No family history on file.    Medications:  Current Outpatient Medications   Medication Sig Dispense Refill     bictegravir-emtricitabine-tenofovir (BIKTARVY) -25 MG per tablet Take 1 tablet by mouth daily 30 tablet 11     imiquimod  (ALDARA) 5 % external cream Apply topically five times a week 48 packet 3     mupirocin (BACTROBAN) 2 % external ointment Use 2 times a day to affected area behind ear for 1 week 30 g 1     SENNA-docusate sodium (SENNA S) 8.6-50 MG tablet Take 1 tablet by mouth 2 times daily (Patient not taking: Reported on 9/13/2019) 60 tablet 11       No Known Allergies    Review of Systems:  -Constitutional: Otherwise feeling well today, in usual state of health.  -HEENT: Patient denies nonhealing oral sores.  -Skin: As above in HPI. No additional skin concerns.    Physical exam:  Vitals: /76   Pulse 69   GEN: This is a well developed, well-nourished male in no acute distress, in a pleasant mood.    SKIN: Focused examination of the genital area, buttock, and face was performed.  -Granger skin type: IV  -there are numerous verrucous/polyploid papules and plaques on the scrotum, shaft of the penis, and R crural fold   -hyperpigmented/erythematous patches on the inferior abdomen, penis, and perianal area with interval improvement  -there is a verrucous hyperpigmented plaque in the L postauricular sulcus with marked improvement   -No other lesions of concern on areas examined.       Impression/Plan:  1. Extensive and very large condyloma acuminata diffusely distributed on the groin, genital area, and left postauricular sulcus in the context of HIV. Much improved on exam today     We discussed the risks and benefits of treatment with cryotherapy and continued treatment with imiquimod. The patient is agreeable with this plan.    Cryotherapy procedure note: After verbal consent and discussion of risks and benefits including but no limited to dyspigmentation/scar, blister, and pain, 18 were treated with 1-2mm freeze border for 2 cycles with liquid nitrogen. Post cryotherapy instructions were provided.    Discontinue: mupirocin 2% ointment BID for one week to the lesion in the L postauricular sulcus    Restart imiquimod 5%  cream daily five times a week       CC Bartolo Amor MD  [address] [place] [address] on close of this encounter.  Follow-up in 4 weeks, earlier for new or changing lesions.       Staff Involved:  Scribe/Staff      Provider Disclosure:   The documentation recorded by the scribe accurately reflects the services I personally performed and the decisions made by me.    Guillermo Stanley MD   of Dermatology  Department of Dermatology  [facility]

## 2019-12-10 ENCOUNTER — TELEPHONE (OUTPATIENT)
Dept: PHARMACY | Facility: CLINIC | Age: 49
End: 2019-12-10

## 2020-01-06 ENCOUNTER — TELEPHONE (OUTPATIENT)
Dept: INFECTIOUS DISEASES | Facility: CLINIC | Age: 50
End: 2020-01-06

## 2020-01-06 NOTE — TELEPHONE ENCOUNTER
Left message for pt reminding them of upcoming appointment.  Instructed pt to bring updated medications list.  mario donahue

## 2020-01-07 ENCOUNTER — OFFICE VISIT (OUTPATIENT)
Dept: INFECTIOUS DISEASES | Facility: CLINIC | Age: 50
End: 2020-01-07
Attending: INTERNAL MEDICINE
Payer: COMMERCIAL

## 2020-01-07 VITALS
HEART RATE: 69 BPM | WEIGHT: 144.3 LBS | HEIGHT: 68 IN | DIASTOLIC BLOOD PRESSURE: 75 MMHG | OXYGEN SATURATION: 98 % | SYSTOLIC BLOOD PRESSURE: 122 MMHG | TEMPERATURE: 97.7 F | BODY MASS INDEX: 21.87 KG/M2

## 2020-01-07 DIAGNOSIS — B20 HUMAN IMMUNODEFICIENCY VIRUS (HIV) DISEASE (H): Primary | ICD-10-CM

## 2020-01-07 DIAGNOSIS — B20 HUMAN IMMUNODEFICIENCY VIRUS (HIV) DISEASE (H): ICD-10-CM

## 2020-01-07 DIAGNOSIS — Z29.89 NEED FOR MALARIA PROPHYLAXIS: ICD-10-CM

## 2020-01-07 DIAGNOSIS — J35.8 TONSILLAR EXUDATE: ICD-10-CM

## 2020-01-07 LAB
ALBUMIN SERPL-MCNC: 3.9 G/DL (ref 3.4–5)
ALP SERPL-CCNC: 51 U/L (ref 40–150)
ALT SERPL W P-5'-P-CCNC: 35 U/L (ref 0–70)
ANION GAP SERPL CALCULATED.3IONS-SCNC: 4 MMOL/L (ref 3–14)
AST SERPL W P-5'-P-CCNC: 15 U/L (ref 0–45)
BASOPHILS # BLD AUTO: 0.1 10E9/L (ref 0–0.2)
BASOPHILS NFR BLD AUTO: 1 %
BILIRUB SERPL-MCNC: 0.4 MG/DL (ref 0.2–1.3)
BUN SERPL-MCNC: 14 MG/DL (ref 7–30)
CALCIUM SERPL-MCNC: 9.3 MG/DL (ref 8.5–10.1)
CHLORIDE SERPL-SCNC: 110 MMOL/L (ref 94–109)
CO2 SERPL-SCNC: 25 MMOL/L (ref 20–32)
CREAT SERPL-MCNC: 0.85 MG/DL (ref 0.66–1.25)
DIFFERENTIAL METHOD BLD: NORMAL
EOSINOPHIL # BLD AUTO: 0.1 10E9/L (ref 0–0.7)
EOSINOPHIL NFR BLD AUTO: 1.2 %
ERYTHROCYTE [DISTWIDTH] IN BLOOD BY AUTOMATED COUNT: 13.1 % (ref 10–15)
GFR SERPL CREATININE-BSD FRML MDRD: >90 ML/MIN/{1.73_M2}
GLUCOSE SERPL-MCNC: 79 MG/DL (ref 70–99)
HCT VFR BLD AUTO: 42 % (ref 40–53)
HGB BLD-MCNC: 14.5 G/DL (ref 13.3–17.7)
IMM GRANULOCYTES # BLD: 0 10E9/L (ref 0–0.4)
IMM GRANULOCYTES NFR BLD: 0.1 %
LYMPHOCYTES # BLD AUTO: 3.2 10E9/L (ref 0.8–5.3)
LYMPHOCYTES NFR BLD AUTO: 41.8 %
MCH RBC QN AUTO: 31.2 PG (ref 26.5–33)
MCHC RBC AUTO-ENTMCNC: 34.5 G/DL (ref 31.5–36.5)
MCV RBC AUTO: 90 FL (ref 78–100)
MONOCYTES # BLD AUTO: 0.5 10E9/L (ref 0–1.3)
MONOCYTES NFR BLD AUTO: 6.1 %
NEUTROPHILS # BLD AUTO: 3.9 10E9/L (ref 1.6–8.3)
NEUTROPHILS NFR BLD AUTO: 49.8 %
NRBC # BLD AUTO: 0 10*3/UL
NRBC BLD AUTO-RTO: 0 /100
PLATELET # BLD AUTO: 208 10E9/L (ref 150–450)
POTASSIUM SERPL-SCNC: 3.8 MMOL/L (ref 3.4–5.3)
PROT SERPL-MCNC: 7.3 G/DL (ref 6.8–8.8)
RBC # BLD AUTO: 4.65 10E12/L (ref 4.4–5.9)
SODIUM SERPL-SCNC: 139 MMOL/L (ref 133–144)
WBC # BLD AUTO: 7.8 10E9/L (ref 4–11)

## 2020-01-07 PROCEDURE — 87536 HIV-1 QUANT&REVRSE TRNSCRPJ: CPT | Performed by: INTERNAL MEDICINE

## 2020-01-07 PROCEDURE — 80053 COMPREHEN METABOLIC PANEL: CPT | Performed by: INTERNAL MEDICINE

## 2020-01-07 PROCEDURE — 85025 COMPLETE CBC W/AUTO DIFF WBC: CPT | Performed by: INTERNAL MEDICINE

## 2020-01-07 PROCEDURE — 86359 T CELLS TOTAL COUNT: CPT | Performed by: INTERNAL MEDICINE

## 2020-01-07 PROCEDURE — G0463 HOSPITAL OUTPT CLINIC VISIT: HCPCS | Mod: ZF

## 2020-01-07 PROCEDURE — 86360 T CELL ABSOLUTE COUNT/RATIO: CPT | Performed by: INTERNAL MEDICINE

## 2020-01-07 PROCEDURE — 36415 COLL VENOUS BLD VENIPUNCTURE: CPT | Performed by: INTERNAL MEDICINE

## 2020-01-07 PROCEDURE — 87102 FUNGUS ISOLATION CULTURE: CPT | Performed by: INTERNAL MEDICINE

## 2020-01-07 PROCEDURE — 87070 CULTURE OTHR SPECIMN AEROBIC: CPT | Performed by: INTERNAL MEDICINE

## 2020-01-07 RX ORDER — ATOVAQUONE AND PROGUANIL HYDROCHLORIDE 250; 100 MG/1; MG/1
1 TABLET, FILM COATED ORAL DAILY
Qty: 30 TABLET | Refills: 0 | Status: SHIPPED | OUTPATIENT
Start: 2020-01-07 | End: 2020-04-07

## 2020-01-07 ASSESSMENT — MIFFLIN-ST. JEOR: SCORE: 1489.04

## 2020-01-07 ASSESSMENT — PAIN SCALES - GENERAL: PAINLEVEL: NO PAIN (0)

## 2020-01-07 NOTE — Clinical Note
1/7/2020      RE: Sara Benjamin  [address]  [facility] [address]         Follow-up HIV    Bartolo Amor MD

## 2020-01-07 NOTE — NURSING NOTE
"Chief Complaint   Patient presents with     RECHECK     Follow up B20     Blood pressure 122/75, pulse 69, temperature 97.7  F (36.5  C), temperature source Oral, height 1.727 m (5' 8\"), weight 65.5 kg (144 lb 4.8 oz), SpO2 98 %.    Daniela Wade on 1/7/2020 at 1:02 PM    "

## 2020-01-07 NOTE — LETTER
Date:January 24, 2020      Patient was self referred, no letter generated. Do not send.        [facility] Information

## 2020-01-08 LAB
CD3 CELLS # BLD: 2786 CELLS/UL (ref 603–2990)
CD3 CELLS NFR BLD: 81 % (ref 49–84)
CD3+CD4+ CELLS # BLD: 1655 CELLS/UL (ref 441–2156)
CD3+CD4+ CELLS NFR BLD: 48 % (ref 28–63)
CD3+CD4+ CELLS/CD3+CD8+ CLL BLD: 1.55 % (ref 1.4–2.6)
CD3+CD8+ CELLS # BLD: 1061 CELLS/UL (ref 125–1312)
CD3+CD8+ CELLS NFR BLD: 31 % (ref 10–40)
IFC SPECIMEN: NORMAL

## 2020-01-08 NOTE — PROGRESS NOTES
INFECTIOUS DISEASE CLINIC    REASON FOR VISIT:   Routine follow-up for HIV    SUBJECTIVE:    Sara Benjamin is a 50 y/o man originally from [facility].   He was most recently in [place], [place] and moved to [place] early 2019.  He first established care with me for HIV on 2/4/2019.   A professional  is used for this encounter.  The language is Tigrinya.    10/8/2019 - last absolute CD4 was 1287 with an undetectable HIV viral load.  Sara has been closely adherent to his HIV medication, which is Biktarvy  (tenofovir alafenamide/emtricitabine/bictegravir).  He switched to this regimen as of our first appointment 2/4/2019 and has tolerated it easily without any noted adverse effects.  He reports easy daily adherence.     Sara denies any interval sexual activity since our last visit, and therefore declines any sexually transmitted infection screening tests for today.    Sara complains of an ongoing sore throat for the past 2 weeks.  He feels dryness and discomfort in the mouth and nose generally.  No headaches, no sinus pain, no rhinorrhea, no SOB.  He felt a dry cough a few times, and once spit out some sputum with flecks of blood.  No rashes.  He asks if he has a fever today, but has not been aware of any fevers personally.   He is 36.5 degrees C in clinic without antipyretic.  He last took ibuprofen before bedtime.    Otherwise, I asked him about constipation.  He tells me that he still experiences this occasionally.  He has not refilled his senna/docusate.    Lastly, he is planning to travel home. He leaves 1/17/20 and will be gone for a month. He is meeting his family in \[facility]\"" and staying in the [place] [facility].  His family have started the process of getting ready to move to the [place], and this is the closest [place] embassy.  He will not be traveling to [facility].  He is unsure if he will leave [place] or not.  It has been 6 years since he last saw his wife and children.  His children are  7, 9, and 11 years of age.    Past Medical History:    Sara reports first learning of his HIV diagnosis about 18 years ago in [place].  Both he and his wife tested positive.  He did not start treatment right away, but was given medications eventually when his wife was pregnant for this first time. (The eldest child is 11 years old). He reports that he and his wife took the same medicine.  He cannot confirm, but from his description it seems most likely that this was Atripla, or the equivalent as dispensed in [facility].  He also remembers taking another medicine for awhile, and then he could stop that one. Again, he cannot confirm, but this sounds like Bactrim.  He reports being treated for active TB once in [place], and another time for a pneumonia.  He does not recall exact dates, and we do not have this documentation.  Other than that he does not recall any significant illnesses.  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    He came to the [facility] via [place] first six years ago.  He has a brother in [place] and came over for work opportunities.  His wife and three children (aged 11, 9, and 7) have not moved yet, but he hopes to bring them soon.  Wife took medications during each pregnancy and the children were not infected with HIV.       Sara was treated for latent TB while in [place], [place].  The source documentation is scanned into EPIC as of 10/8/2019.    Clinic:  [facility]  Dates of treatment:  7/22/2015 through 11/18/2015  (4 months)  Medication:  rifampin 600mg po daily   4/9/2015  Quantiferon Gold  Positive  4/20/2015  Chest XR - no signs of active TB  Three sputums were negative for AFB growth, 4/29/2015, 5/3/2015, 5/5/2015    Patient presented to me 2/4/2019 on Descovy (tenofovir alafenamide/emtricitabine) and dolutegravir. He reported taking this regimen for the previous 1 year.  He was  "tolerating well and reported close adherence.  We switched to Biktarvy then to simplify the regimen to a single tablet daily.   OBJECTIVE:    Current Outpatient Medications   Medication     bictegravir-emtricitabine-tenofovir (BIKTARVY) -25 MG per tablet     imiquimod (ALDARA) 5 % external cream     mupirocin (BACTROBAN) 2 % external ointment     SENNA-docusate sodium (SENNA S) 8.6-50 MG tablet      Allergies:  No Known Allergies    Physical Examination:    VITAL SIGNS: /75 (BP Location: Right arm, Patient Position: Sitting, Cuff Size: Adult Regular)   Pulse 69   Temp 97.7  F (36.5  C) (Oral)   Ht 1.727 m (5' 8\")   Wt 65.5 kg (144 lb 4.8 oz)   SpO2 98%   BMI 21.94 kg/m    GENERAL:   No acute distress, thin but not cachectic   HEENT: No icterus or injection. Oropharynx moist with some white tonsillar exudate (swabbed). Pharynx without significant erythema or swelling.   Poor dentition, several caries and missing teeth    NECK: Supple and nontender  LYMPH:  Some shotty cervical lymphadenopathy which is non-tender and freely moveable, no axillary or inguinal lymphadenopathy  LUNGS: Clear to ausculation bilaterally without any increased work of breathing  HEART: Regular rate and rhythm and no murmur, gallop or rub    ABDOMEN: Normoactive bowel sounds, soft, nontender, nondistended, no hepatosplenomegaly.    /GYN:  (previous exam demonstrated extensive large condyloma acuminata throughout groin and genital area)  Not repeated today.  EXTREMITIES: Warm and well perfused without clubbing, cyanosis, or edema  SKIN:  No other skin lesions or rashes  NEURO:  Awake, alert, no focal neurologic deficits.  PSYCH: Affect normal. Speech fluent and appropriate.     Laboratory Data:    10/8/2019 - last absolute CD4 was 1287 with an undetectable HIV viral load    ASSESSMENT AND PLAN:       1. HIV.    Sara is doing very well on once daily single tablet Biktarvy.  (tenofovir alafenamide/emtricitabine/bictegravir).  " He has never missed an appointment and is appropriately engaged in care.  He reports easy daily adherence.  We will continue Biktarvy.    Routine monitoring labs are ordered for today.     Orders Placed This Encounter   Procedures     CBC with platelets differential     Comprehensive metabolic panel     HIV-1 RNA quantitative     T cell subset profile     2. Throat pain - white tonsillar exudate without much erythema.  I did swab this for throat culture, but I do not have high concern.   I will call patient if culture shows something we need to treat.    3.  Upcoming travel to [place]  - patient plans on mainly staying in [facility].  This is at high elevation, so very low risk for malaria.    I did prescribe Malarone, and instructed the patient to start it 2 days before he plans to go out of [place] and continue for 7 days after he returns.      4.  Large condyloma acuminata of the groin and genital regions - very extensive.  Patient is followed by the [facility] clinic.  His next appointment is scheduled for 1/15/20.    5.  Occasional constipation.  Recommended to  a refill today for senna/docusate. I have reminded him that he can use this as needed to aid in regular bowel movements.        Follow-up:  Return in 3 months for routine HIV care.      Bartolo Amor MD, MS    Infectious Disease      Time:  I spent 30 minutes in direct care with this patient, including >50% of time face-to-face with the patient in counseling.

## 2020-01-09 ENCOUNTER — OFFICE VISIT (OUTPATIENT)
Dept: DERMATOLOGY | Facility: CLINIC | Age: 50
End: 2020-01-09
Payer: MEDICAID

## 2020-01-09 DIAGNOSIS — Z53.9 ERRONEOUS ENCOUNTER--DISREGARD: Primary | ICD-10-CM

## 2020-01-09 LAB
BACTERIA SPEC CULT: NORMAL
Lab: NORMAL
SPECIMEN SOURCE: NORMAL

## 2020-01-09 NOTE — PROGRESS NOTES
Patient not seen due to lack of interpretor. Will reschedule for next week. No charge visit.     Clark Gilliland MD    Department of Dermatology  [facility]: Phone: [phone], Fax:[phone]  [facility]: Phone: [phone], Fax: [phone]

## 2020-01-09 NOTE — LETTER
1/9/2020       RE: Sara Benjamin  [address]  [facility] [facility] [address]     Dear Colleague,    Thank you for referring your patient, Sara Benjamin, to the [facility] DERMATOLOGY at [facility]. Please see a copy of my visit note below.    Patient not seen due to lack of interpretor. Will reschedule for next week. No charge visit.     Clark Gilliland MD    Department of Dermatology  [facility]: Phone: [phone], Fax:[phone]  [facility]: Phone: [phone], Fax: [phone]      Again, thank you for allowing me to participate in the care of your patient.      Sincerely,    Clark Gilliland MD

## 2020-01-09 NOTE — PROGRESS NOTES
[facility] Dermatology Note      Dermatology Problem List:  1. Extensive large condyloma acuminata diffusely distributed in the groin and genital area, in context of HIV (VL undetectable; CD4 >1197; on antiretrovirals)              - multiple shave biopsies performed 3/4/19, 4/5/19 on largest lesions (no SCC identified)              - imiquimod started 3/4/19              - s/p extensive cryotherapy 8/12/19, 9/9/19, 11/4/19    Encounter Date: Jan 9, 2020    CC:  Chief Complaint   Patient presents with     Skin Check     Sara is here today for a skin check.  was not available.         History of Present Illness:  Mr. Sara Benjamin is a 49 year old male who presents for a skin check. He was last seen by Dr. Stanley 11/4/2019 for cryotherapy for 18 *** on the genital area and the postauricular sulcus. At today's visit,     Past Medical History:   Patient Active Problem List   Diagnosis     Condyloma acuminata     Past Medical History:   Diagnosis Date     Condyloma acuminata      HIV (human immunodeficiency virus infection) (H)     on Biktarvy     No past surgical history on file.    Social History:  Patient reports that he has quit smoking. He has never used smokeless tobacco. He reports previous alcohol use.    Family History:  No family history on file.    Medications:  Current Outpatient Medications   Medication Sig Dispense Refill     atovaquone-proguanil (MALARONE) 250-100 MG tablet Take 1 tablet by mouth daily Start 2 days before travel and continue 7 days after return. 30 tablet 0     bictegravir-emtricitabine-tenofovir (BIKTARVY) -25 MG per tablet Take 1 tablet by mouth daily 30 tablet 11     imiquimod (ALDARA) 5 % external cream Apply topically five times a week 48 packet 5     mupirocin (BACTROBAN) 2 % external ointment Use 2 times a day to affected area behind ear for 1 week 30 g 1     SENNA-docusate sodium (SENNA S) 8.6-50 MG tablet Take 1 tablet by mouth 2 times daily  60 tablet 11       No Known Allergies    Review of Systems:  -Constitutional: Otherwise feeling well today, in usual state of health.  -HEENT: Patient denies nonhealing oral sores.  -Skin: As above in HPI. No additional skin concerns.    Physical exam:  Vitals: There were no vitals taken for this visit.  GEN: This is a well developed, well-nourished male in no acute distress, in a pleasant mood.    SKIN: Focused examination of the genital area, buttock, and face was performed.  -Granger skin type: IV  -there are numerous verrucous/polyploid papules and plaques on the scrotum, shaft of the penis, and R crural fold   -hyperpigmented/erythematous patches on the inferior abdomen, penis, and perianal area with interval improvement  -there is a verrucous hyperpigmented plaque in the L postauricular sulcus with marked improvement   -No other lesions of concern on areas examined.       Impression/Plan:  1. Sun damaged skin with solar lentigines  - Recommend sunscreens SPF #30 or greater, protective clothing and avoidance of tanning beds.    2. Benign findings including: seborrheic keratoses, cherry angioma  - No further intervention required. Patient to report changes.   - Patient reassured of the benign nature of these lesions.    3. Multiple clinically benign nevi  - No further intervention required. Patient to report changes.   - Patient reassured of the benign nature of these lesions.    4. {Diagnosesderm:302603}  - {rfplan:581948}  - ***    5. {Diagnosesderm:222054}  - {rfplan:846991}  - ***        CC Bartolo Amor MD  [address] [place] [id number] on close of this encounter.  Follow-up in ***, earlier for new or changing lesions.       Staff Involved:  Scribe/Staff    Scribe Disclosure  I, Concepción Mancera, am serving as a scribe to document services personally performed by Dr. Clark Gilliland MD, based on data collection and the provider's statements to me.    ***

## 2020-01-13 LAB
Lab: NORMAL
SPECIMEN SOURCE: NORMAL
YEAST SPEC QL CULT: NORMAL

## 2020-01-15 ENCOUNTER — OFFICE VISIT (OUTPATIENT)
Dept: DERMATOLOGY | Facility: CLINIC | Age: 50
End: 2020-01-15
Payer: MEDICAID

## 2020-01-15 DIAGNOSIS — A63.0 CONDYLOMA ACUMINATA: ICD-10-CM

## 2020-01-15 RX ORDER — IMIQUIMOD 12.5 MG/.25G
CREAM TOPICAL
Qty: 48 PACKET | Refills: 5 | Status: SHIPPED | OUTPATIENT
Start: 2020-01-15 | End: 2021-06-24

## 2020-01-15 ASSESSMENT — PAIN SCALES - GENERAL: PAINLEVEL: NO PAIN (0)

## 2020-01-15 NOTE — LETTER
1/15/2020       RE: Sara Benjamin  [address]  [place] [address]     Dear Colleague,    Thank you for referring your patient, Sara Benjamin, to the [facility] DERMATOLOGY at [facility]. Please see a copy of my visit note below.    [facility] Dermatology Note      Dermatology Problem List:  1. Extensive large condyloma acuminata diffusely distributed in the groin and genital area, in context of HIV (VL undetectable; CD4 >1197; on antiretrovirals)              - multiple shave biopsies performed 3/4/19, 4/5/19 on largest lesions (no SCC identified)              - imiquimod started 3/4/19              - s/p extensive cryotherapy 8/12/19, 9/9/19, 11/4/19, 1/15/2020   - improved on exam 1/15/2020 with residual lesions on the penile shaft and left groin area.     - [facility]    Encounter Date: Joao 15, 2020    CC:  Chief Complaint   Patient presents with     Skin Check     Sara is here today for skin check.        History of Present Illness:  Mr. Sara Benjamin is a 49 year old male who presents as a follow-up for warts. The patient was last seen 11/4/2019 when he underwent cryotherapy and continued on imiquimod topically. At that time, he also discontinued mupirocin ointment topically for the lesion in his left postauricular sulcus. Today, the patients presents with an . He notes improvement to the warts, and has been using topical imiquimod with relief, though he notes he has run out since his last visit. Tomorrow, the patient will be visiting his wife overseas and will be back  ~2/17/2020. He inquires as to whether he can sexually transmit the virus to his wife. The patient voices no other concerns.    Past Medical History:   Patient Active Problem List   Diagnosis     Condyloma acuminata     Past Medical History:   Diagnosis Date     Condyloma acuminata      HIV (human immunodeficiency virus infection)  (H)     on Biktarvy     No past surgical history on file.    Social History:  Patient reports that he has quit smoking. He has never used smokeless tobacco. He reports previous alcohol use.    Family History:  No family history on file.    Medications:  Current Outpatient Medications   Medication Sig Dispense Refill     atovaquone-proguanil (MALARONE) 250-100 MG tablet Take 1 tablet by mouth daily Start 2 days before travel and continue 7 days after return. 30 tablet 0     bictegravir-emtricitabine-tenofovir (BIKTARVY) -25 MG per tablet Take 1 tablet by mouth daily 30 tablet 11     imiquimod (ALDARA) 5 % external cream Apply topically five times a week 48 packet 5     mupirocin (BACTROBAN) 2 % external ointment Use 2 times a day to affected area behind ear for 1 week 30 g 1     SENNA-docusate sodium (SENNA S) 8.6-50 MG tablet Take 1 tablet by mouth 2 times daily 60 tablet 11       No Known Allergies    Review of Systems:  -Constitutional: Otherwise feeling well today, in usual state of health.  -HEENT: Patient denies nonhealing oral sores.  -Skin: As above in HPI. No additional skin concerns.    Physical exam:  Vitals: There were no vitals taken for this visit.  GEN: This is a well developed, well-nourished male in no acute distress, in a pleasant mood.    SKIN: Focused examination of the genital area, buttock, and face was performed.  - Granger skin type: V  - Verrucous papules x 2 on the penile shaft and larger annular verrucous plaque x 1 on the left mons pubis/inguinal area  - hyperpigmented/hypopigmented on the inferior abdomen, penis, and perianal area with interval improvement.  - No active verruca on lower abdomen / perianal area except as discussed above  -No other lesions of concern on areas examined.       Impression/Plan:    1. Extensive and very large condyloma acuminata diffusely distributed on the groin, genital area, and left postauricular sulcus in the context of HIV. Improved  significantly on exam today with only few residuals lesions on the penile shaft and left mons pubis/inguinal area. Will treat residual lesions with cryotherapy as below. Follow-up in 4-6 weeks for repeat treatment. Otherwise continue home aldara five times weekly.       Continue Imiquimod 5% cream daily five days a week. Refill provided.     PROCEDURE NOTE  Cryotherapy procedure note: After verbal consent and discussion of risks and benefits including but no limited to dyspigmentation/scar, blister, and pain, 3 lesions were treated with 1-2mm freeze border for 2 cycles with liquid nitrogen. Post cryotherapy instructions were provided.     CC Bartolo Amor MD  [address] [place] [id number] on close of this encounter.    Follow-up in one month, earlier for new or changing lesions.     Staff Involved:  Scribe/Staff    Scribe Disclosure  I, Concepción Mancera, am serving as a scribe to document services personally performed by Dr. Clark Gilliland MD, based on data collection and the provider's statements to me.    Provider Disclosure:   The documentation recorded by the scribe accurately reflects the services I personally performed and the decisions made by me.    Clark Gilliland MD    Department of Dermatology  [facility]: Phone: [phone], Fax:[phone]  [facility]: Phone: [phone], Fax: [phone]

## 2020-01-15 NOTE — NURSING NOTE
Chief Complaint   Patient presents with     Skin Check     Mogos is here today for skin check.        Berta Retana LPN

## 2020-01-15 NOTE — PATIENT INSTRUCTIONS
Cryotherapy    What is it?    Use of a very cold liquid, such as liquid nitrogen, to freeze and destroy abnormal skin cells that need to be removed    What should I expect?    Tenderness and redness    A small blister that might grow and fill with dark purple blood. There may be crusting.    More than one treatment may be needed if the lesions do not go away.    How do I care for the treated area?    Gently wash the area with your hands when bathing.    Use a thin layer of Vaseline to help with healing. You may use a Band-Aid.     The area should heal within 7-10 days and may leave behind a pink or lighter color.     Do not use an antibiotic or Neosporin ointment.     You may take acetaminophen (Tylenol) for pain.     Call your Doctor if you have:    Severe pain    Signs of infection (warmth, redness, cloudy yellow drainage, and or a bad smell)    Questions or concerns    Who should I call with questions?       [facility]: [phone]       [facility]: [phone]       For urgent needs outside of business hours call the [facility] at [phone]        and ask for the dermatology resident on call

## 2020-01-15 NOTE — PROGRESS NOTES
[facility] Dermatology Note      Dermatology Problem List:  1. Extensive large condyloma acuminata diffusely distributed in the groin and genital area, in context of HIV (VL undetectable; CD4 >1197; on antiretrovirals)              - multiple shave biopsies performed 3/4/19, 4/5/19 on largest lesions (no SCC identified)              - imiquimod started 3/4/19              - s/p extensive cryotherapy 8/12/19, 9/9/19, 11/4/19, 1/15/2020   - improved on exam 1/15/2020 with residual lesions on the penile shaft and left groin area.     - LiqN2 1/15/2020 continuing home aldara five time weekly    Encounter Date: Joao 15, 2020    CC:  Chief Complaint   Patient presents with     Skin Check     Sara is here today for skin check.        History of Present Illness:  Mr. Sara Benjamin is a 49 year old male who presents as a follow-up for warts. The patient was last seen 11/4/2019 when he underwent cryotherapy and continued on imiquimod topically. At that time, he also discontinued mupirocin ointment topically for the lesion in his left postauricular sulcus. Today, the patients presents with an . He notes improvement to the warts, and has been using topical imiquimod with relief, though he notes he has run out since his last visit. Tomorrow, the patient will be visiting his wife overseas and will be back  ~2/17/2020. He inquires as to whether he can sexually transmit the virus to his wife. The patient voices no other concerns.    Past Medical History:   Patient Active Problem List   Diagnosis     Condyloma acuminata     Past Medical History:   Diagnosis Date     Condyloma acuminata      HIV (human immunodeficiency virus infection) (H)     on Biktarvy     No past surgical history on file.    Social History:  Patient reports that he has quit smoking. He has never used smokeless tobacco. He reports previous alcohol use.    Family History:  No family history on file.    Medications:  Current Outpatient  Medications   Medication Sig Dispense Refill     atovaquone-proguanil (MALARONE) 250-100 MG tablet Take 1 tablet by mouth daily Start 2 days before travel and continue 7 days after return. 30 tablet 0     bictegravir-emtricitabine-tenofovir (BIKTARVY) -25 MG per tablet Take 1 tablet by mouth daily 30 tablet 11     imiquimod (ALDARA) 5 % external cream Apply topically five times a week 48 packet 5     mupirocin (BACTROBAN) 2 % external ointment Use 2 times a day to affected area behind ear for 1 week 30 g 1     SENNA-docusate sodium (SENNA S) 8.6-50 MG tablet Take 1 tablet by mouth 2 times daily 60 tablet 11       No Known Allergies    Review of Systems:  -Constitutional: Otherwise feeling well today, in usual state of health.  -HEENT: Patient denies nonhealing oral sores.  -Skin: As above in HPI. No additional skin concerns.    Physical exam:  Vitals: There were no vitals taken for this visit.  GEN: This is a well developed, well-nourished male in no acute distress, in a pleasant mood.    SKIN: Focused examination of the genital area, buttock, and face was performed.  - [place]anger skin type: V  - Verrucous papules x 2 on the penile shaft and larger annular verrucous plaque x 1 on the left mons pubis/inguinal area  - hyperpigmented/hypopigmented on the inferior abdomen, penis, and perianal area with interval improvement.  - No active verruca on lower abdomen / perianal area except as discussed above  -No other lesions of concern on areas examined.       Impression/Plan:    1. Extensive and very large condyloma acuminata diffusely distributed on the groin, genital area, and left postauricular sulcus in the context of HIV. Improved significantly on exam today with only few residuals lesions on the penile shaft and left mons pubis/inguinal area. Will treat residual lesions with cryotherapy as below. Follow-up in 4-6 weeks for repeat treatment. Otherwise continue home aldara five times weekly.       Continue  Imiquimod 5% cream daily five days a week. Refill provided.     PROCEDURE NOTE  Cryotherapy procedure note: After verbal consent and discussion of risks and benefits including but no limited to dyspigmentation/scar, blister, and pain, 3 lesions were treated with 1-2mm freeze border for 2 cycles with liquid nitrogen. Post cryotherapy instructions were provided.     CC Bartolo Amor MD  [address] [place] [phone] on close of this encounter.    Follow-up in one month, earlier for new or changing lesions.     Staff Involved:  Scribe/Staff    Scribe Disclosure  I, Concepción Mancera, am serving as a scribe to document services personally performed by Dr. Clark Gilliland MD, based on data collection and the provider's statements to me.    Provider Disclosure:   The documentation recorded by the scribe accurately reflects the services I personally performed and the decisions made by me.    Clark Gilliland MD    Department of Dermatology  [facility]: Phone: [phone], Fax:[phone]  [facility]: Phone: [phone], Fax: [phone]

## 2020-02-20 ENCOUNTER — TELEPHONE (OUTPATIENT)
Dept: DERMATOLOGY | Facility: CLINIC | Age: 50
End: 2020-02-20

## 2020-02-20 NOTE — TELEPHONE ENCOUNTER
Patient came to clinic today but insurance is still pending. Deferred in-office consultation today. Recommended patient continue aldara cream five times weekly and will reschedule for about 4 weeks for follow-up. Patient is working with  at [facility] on insurance coverage.     Clark Gilliland MD  Pronouns: he/him/his    Department of Dermatology  [facility]: Phone: [phone], Fax:[phone]  [facility]: Phone: [phone] Fax: [phone]

## 2020-03-11 ENCOUNTER — HEALTH MAINTENANCE LETTER (OUTPATIENT)
Age: 50
End: 2020-03-11

## 2020-04-07 ENCOUNTER — VIRTUAL VISIT (OUTPATIENT)
Dept: INFECTIOUS DISEASES | Facility: CLINIC | Age: 50
End: 2020-04-07
Attending: INTERNAL MEDICINE
Payer: MEDICAID

## 2020-04-07 DIAGNOSIS — B20 HUMAN IMMUNODEFICIENCY VIRUS (HIV) DISEASE (H): Primary | ICD-10-CM

## 2020-04-07 NOTE — PROGRESS NOTES
"Sara Benjamin is a 49 year old male who is being evaluated via a billable telephone visit.      The patient has been notified of following:     \"This telephone visit will be conducted via a call between you and your physician/provider. We have found that certain health care needs can be provided without the need for a physical exam.  This service lets us provide the care you need with a short phone conversation.  If a prescription is necessary we can send it directly to your pharmacy.  If lab work is needed we can place an order for that and you can then stop by our lab to have the test done at a later time.    Telephone visits are billed at different rates depending on your insurance coverage. During this emergency period, for some insurers they may be billed the same as an in-person visit.  Please reach out to your insurance provider with any questions.    If during the course of the call the physician/provider feels a telephone visit is not appropriate, you will not be charged for this service.\"    Patient has given verbal consent for Telephone visit?  Yes    Start time:  1:57 PM    End time:  2:10 PM      Reason for visit:   Infectious Disease Return Visit, planned follow-up for HIV    SUBJECTIVE:    I connected with Sara by telephone, using the [employer]  line for [employer].    Sara reports feeling well.  He did go to [place] as planned.  He saw his wife and children.  He did not experience any illnesses while he was there.    He is not working currently.  It sounds like maybe he was laid off, but this was not clear.   He has only one tablet left of his Biktarvy, and our SW Girish Echeverria is helping with this insurance lapse, and they have a plan in place.  Otherwise, Sara reports no issues with daily adherence, and reports no adverse effects.    OBJECTIVE:    Current Outpatient Medications   Medication     bictegravir-emtricitabine-tenofovir (BIKTARVY) -25 MG per tablet     imiquimod " (ALDARA) 5 % external cream     mupirocin (BACTROBAN) 2 % external ointment     SENNA-docusate sodium (SENNA S) 8.6-50 MG tablet     Current Facility-Administered Medications   Medication     lidocaine 1% with EPINEPHrine 1:100,000 injection 3 mL     lidocaine 1% with EPINEPHrine 1:100,000 injection 3 mL       No Known Allergies      No new labs or imaging to review    Last had labs 1/7/2020.  Sara has stable labs, without signs of toxicity, with an undetectable viral load and a robust absolute CD4 of 1,655.    ASSESSMENT:    During this COVID pandemic, I will not enforce strict laboratory monitoring in this patient with stable and well controlled HIV.   He has tolerated Biktarvy, with many additional labs showing no toxicity prior to this.    Sara is encouraged to reach out to our clinic if he develops any concerns or issues.    PLAN:    - RTC in 3 months for labs and visit, scheduled for in person on Friday July 3, at 4:00pm.   If necessary to postpone labs further and have another virtual visit, I will permit this for up to one year after last labs (until January 2021).      Bartolo Amor MD, MS  Infectious Disease    Time:  I spent a total of 13 minutes on the telephone with the patient, including >50% of time with the patient in counseling.      Phone call duration: 13 minutes

## 2020-07-03 ENCOUNTER — VIRTUAL VISIT (OUTPATIENT)
Dept: INFECTIOUS DISEASES | Facility: CLINIC | Age: 50
End: 2020-07-03
Attending: INTERNAL MEDICINE
Payer: COMMERCIAL

## 2020-07-03 DIAGNOSIS — B20 HUMAN IMMUNODEFICIENCY VIRUS (HIV) DISEASE (H): Primary | ICD-10-CM

## 2020-07-03 ASSESSMENT — PAIN SCALES - GENERAL: PAINLEVEL: NO PAIN (0)

## 2020-07-03 NOTE — Clinical Note
"7/3/2020       RE: Sara Benjamin  [address]  [place] [address]     Dear Colleague,    Thank you for referring your patient, Sara Benjamin, to the [facility] AND INFECTIOUS DISEASES at [facility]. Please see a copy of my visit note below.      Sara Benjamin is a 49 year old male who is being evaluated via a billable telephone visit.      The patient has been notified of following:     \"This telephone visit will be conducted via a call between you and your physician/provider. We have found that certain health care needs can be provided without the need for a physical exam.  This service lets us provide the care you need with a short phone conversation.  If a prescription is necessary we can send it directly to your pharmacy.  If lab work is needed we can place an order for that and you can then stop by our lab to have the test done at a later time.    Telephone visits are billed at different rates depending on your insurance coverage. During this emergency period, for some insurers they may be billed the same as an in-person visit.  Please reach out to your insurance provider with any questions.    If during the course of the call the physician/provider feels a telephone visit is not appropriate, you will not be charged for this service.\"    Patient has given verbal consent for Telephone visit?  Yes    What phone number would you like to be contacted at? [phone]    How would you like to obtain your AVS? Mail a copy    Phone call duration: 11 minutes      Reason for visit:   Infectious Disease Return Visit, planned follow-up for HIV     services used over the telephone for language:  Riccardo    SUBJECTIVE:      I have reviewed and updated the patient's Past Medical History, Social History, Family History and Medication List.    OBJECTIVE:    Current Outpatient Medications   Medication     bictegravir-emtricitabine-tenofovir (BIKTARVY) -25 " MG per tablet     imiquimod (ALDARA) 5 % external cream     mupirocin (BACTROBAN) 2 % external ointment     SENNA-docusate sodium (SENNA S) 8.6-50 MG tablet     Current Facility-Administered Medications   Medication     lidocaine 1% with EPINEPHrine 1:100,000 injection 3 mL     lidocaine 1% with EPINEPHrine 1:100,000 injection 3 mL       No Known Allergies      Examination via telephone visit:     GENERAL: Patient is alert and does not seem to be in any acute distress    RESPIRATORY:  Breathing is not audible. No cough or labored breathing is noted.  PSYCH: Affect is bright. Speech fluent and appropriate.      The rest of a comprehensive physical examination is deferred due to the public health emergency telephone visit restrictions.      No new labs or imaging to review      ASSESSMENT:      PLAN:        Bartolo Amor MD, MS  Infectious Disease    Time:  I spent 11 total minutes on the telephone with the patient, including >50% of time in counseling.        Again, thank you for allowing me to participate in the care of your patient.      Sincerely,    Bartolo Amor MD

## 2020-07-03 NOTE — LETTER
Date:July 21, 2020      Patient was self referred, no letter generated. Do not send.        [facility] Health Information      
[phone]

## 2020-07-03 NOTE — PROGRESS NOTES
"  Sara Benjamin is a 49 year old male who is being evaluated via a billable telephone visit.      The patient has been notified of following:     \"This telephone visit will be conducted via a call between you and your physician/provider. We have found that certain health care needs can be provided without the need for a physical exam.  This service lets us provide the care you need with a short phone conversation.  If a prescription is necessary we can send it directly to your pharmacy.  If lab work is needed we can place an order for that and you can then stop by our lab to have the test done at a later time.    Telephone visits are billed at different rates depending on your insurance coverage. During this emergency period, for some insurers they may be billed the same as an in-person visit.  Please reach out to your insurance provider with any questions.    If during the course of the call the physician/provider feels a telephone visit is not appropriate, you will not be charged for this service.\"    Patient has given verbal consent for Telephone visit?  Yes    What phone number would you like to be contacted at? [phone]    Also used professional [employer]     How would you like to obtain your AVS? Mail a copy    Phone call duration: 11 minutes    Reason for visit:   Infectious Disease Return Visit, planned follow-up for HIV    SUBJECTIVE:    I connected with Sara by telephone, using the [employer]  line for [employer].    Sara reports feeling well.  He is still laid off and collecting unemployment.  The immigration process for his family is ongoing, and Sara recently submitted some new paperwork.    He is taking Biktarvy daily, and has not had any further issues or insurance lapses since our visit 3 months ago. Sara did not even miss one tablet, and is paying $3 per month for the refills.  No noted side effects.      OBJECTIVE:    Current Outpatient Medications   Medication     " bictegravir-emtricitabine-tenofovir (BIKTARVY) -25 MG per tablet     imiquimod (ALDARA) 5 % external cream     mupirocin (BACTROBAN) 2 % external ointment     SENNA-docusate sodium (SENNA S) 8.6-50 MG tablet     Current Facility-Administered Medications   Medication     lidocaine 1% with EPINEPHrine 1:100,000 injection 3 mL     lidocaine 1% with EPINEPHrine 1:100,000 injection 3 mL       No Known Allergies    Examination via telephone visit:     GENERAL: Patient is alert and does not seem to be in any acute distress    RESPIRATORY:  Breathing is not audible. No cough or labored breathing is noted.  PSYCH: Affect is bright. Speech fluent and appropriate.      The rest of a comprehensive physical examination is deferred due to the public health emergency telephone visit restrictions.      No new labs or imaging to review    Last had labs 1/7/2020.  Sara has stable labs, without signs of toxicity, with an undetectable viral load and a robust absolute CD4 of 1,655.      ASSESSMENT:    Well controlled HIV infection    During this COVID pandemic, I will not enforce strict laboratory monitoring in this patient with stable and well controlled HIV.   He has tolerated Biktarvy, with many additional labs showing no toxicity prior to this.    Sara is encouraged to reach out to our clinic if he develops any concerns or issues.    PLAN:    - continue Biktarvy    - RTC in 3 months for labs and visit, scheduled for in person on Friday September 18th at 1:00pm.   If necessary to postpone labs further and have another virtual visit, I will permit this for up to one year after last labs (until January 2021).      Bartolo Amor MD, MS  Infectious Disease    Time:  I spent a total of 11 minutes on the telephone with the patient, including >50% of time with the patient in counseling.      Phone call duration: 11 minutes

## 2020-08-06 DIAGNOSIS — B20 HUMAN IMMUNODEFICIENCY VIRUS (HIV) DISEASE (H): ICD-10-CM

## 2020-08-07 DIAGNOSIS — B20 HUMAN IMMUNODEFICIENCY VIRUS (HIV) DISEASE (H): ICD-10-CM

## 2020-10-02 PROBLEM — B20 HUMAN IMMUNODEFICIENCY VIRUS (HIV) DISEASE (H): Chronic | Status: ACTIVE | Noted: 2020-10-02

## 2021-01-03 ENCOUNTER — HEALTH MAINTENANCE LETTER (OUTPATIENT)
Age: 51
End: 2021-01-03

## 2021-04-25 ENCOUNTER — HEALTH MAINTENANCE LETTER (OUTPATIENT)
Age: 51
End: 2021-04-25

## 2021-04-27 ENCOUNTER — VIRTUAL VISIT (OUTPATIENT)
Dept: INFECTIOUS DISEASES | Facility: CLINIC | Age: 51
End: 2021-04-27
Attending: INTERNAL MEDICINE
Payer: COMMERCIAL

## 2021-04-27 DIAGNOSIS — B20 HUMAN IMMUNODEFICIENCY VIRUS (HIV) DISEASE (H): Primary | ICD-10-CM

## 2021-04-27 PROCEDURE — 99215 OFFICE O/P EST HI 40 MIN: CPT | Mod: 95 | Performed by: INTERNAL MEDICINE

## 2021-04-27 NOTE — LETTER
"4/27/2021       RE: Sara Benjamin  [address]  [place] [address]     Dear Colleague,    Thank you for referring your patient, Sara Benjamin, to the [facility] INFECTIOUS DISEASE CLINIC [facility]. Please see a copy of my visit note below.      Sara Benjamin is a 50 year old man who is being evaluated via a billable telephone visit.      What phone number would you like to be contacted at? [phone]  How would you like to obtain your AVS? Mail a copy     Phone call duration: 22 minutes    Professional  services were used for this telephone encounter.  I called the [employer]  line [phone].  Language used was Tigrinya.      Reason for visit:   Infectious Disease Return Visit, planned follow-up for HIV    SUBJECTIVE:    Sara Benjamin is a 49 y/o man originally from [place].   He lives with well controlled HIV infection.  Last labs were over 1 year ago, however, with some delays in return to care due to the COVID pandemic.   1/2020 with HIV viral load not detected, and absolute CD4 1,665.     Sara tells me that he is back working, and \"everything is OK.\"  His wife and children are still in [facility] waiting for the next step in the immigration process (which is an interview of some kind).  He visited them there last year 1/2020 and has not seen them since.  His children are now 8, 10, and 12 years of age.  No sexual activity since this trip over 1 year ago.     He is taking his Biktarvy every day without concerns for adverse effects.   He has not had COVID infection to his knowledge, nor have any close [place] friends or family.  He did have an illness of some kind about 5-6 months ago where he felt extremely dizzy.  If he tried to stand up, he would vomit.  He vomited several times.  A friend came over and gave him something to drink, and he felt better.  Therefore, he never sought medical attention.  Ever " since this illness, he has intermittent dizziness that comes and goes.   He denies that the room is spinning, but only that he is dizzy.  No loss of consciousness has occurred, and there is no headache.   No new medications.  Only OTC medication is occasional Advil.    I have reviewed and updated the patient's Past Medical History, Social History, Family History and Medication List.     Past Medical History:     Sara reports first learning of his HIV diagnosis about 20 years ago in [facility] in about 2001.  Both he and his wife tested positive.  He did not start treatment right away, but was given medications eventually when his wife was pregnant for this first time. (The eldest child is 12 years old). He reports that he and his wife took the same medicine. Wife took medications during each pregnancy and the children were not infected with HIV.   He cannot confirm, but from his description it seems most likely that this was Atripla, or the equivalent as dispensed in [facility].  He also remembers taking another medicine for awhile, and then he could stop that one. Again, he cannot confirm, but this sounds like Bactrim.  He reports being treated for active TB once in [place], and another time for a pneumonia.  He does not recall exact dates, and we do not have this documentation.  Other than that he does not recall any significant illnesses.  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     He came to the [facility] via [place] first about 2013.  He has a brother in [place] and came over for work opportunities.            Sara was treated for latent TB while in [place].  The source documentation is scanned into EPIC as of 10/8/2019.     Clinic:  [facility]  Dates of treatment:  7/22/2015 through 11/18/2015  (4 months)  Medication:  rifampin 600mg po daily   4/9/2015  Quantiferon Gold   "Positive  4/20/2015  Chest XR - no signs of active TB  Three sputums were negative for AFB growth, 4/29/2015, 5/3/2015, 5/5/2015     Patient presented to me 2/4/2019 on Descovy (tenofovir alafenamide/emtricitabine) and dolutegravir. He reported taking this regimen for the previous 1 year.  He was tolerating well and reported close adherence.  We switched to Biktarvy then to simplify the regimen to a single tablet daily.      OBJECTIVE:    Current Outpatient Medications   Medication     bictegravir-emtricitabine-tenofovir (BIKTARVY) -25 MG per tablet     imiquimod (ALDARA) 5 % external cream     mupirocin (BACTROBAN) 2 % external ointment     SENNA-docusate sodium (SENNA S) 8.6-50 MG tablet     Current Facility-Administered Medications   Medication     lidocaine 1% with EPINEPHrine 1:100,000 injection 3 mL     lidocaine 1% with EPINEPHrine 1:100,000 injection 3 mL       No Known Allergies      Examination via telephone visit:     GENERAL: Patient is alert and does not seem to be in any acute distress    RESPIRATORY:  No cough or labored breathing is noted.  PSYCH: Affect is bright. Speech fluent and appropriate.      The rest of a comprehensive physical examination is deferred due to the public health emergency telephone visit restrictions.      No new labs or imaging to review      ASSESSMENT and PLAN:    History of well controlled HIV infection, overdue for laboratory monitoring.  Patient denies the need for any STI screening.  Will refill Biktarvy now for 30 days, and then give a 6 month prescription if labs are all stable.    Orders Placed This Encounter   Procedures     Comprehensive metabolic panel     CBC with platelets differential     HIV-1 RNA quantitative     T cell subset profile     UA with Microscopic     Lipid Profile       Ongoing intermittent dizziness    The patient expressed a desire to be \"checked out\" in person.  He is wondering about this dizziness, and wants his blood pressure checked.  I " doubt that this is related to Biktarvy, or to his HIV given that it is so well controlled with very high absolute CD4.  The initial episode sounded like vertigo potentially, but he does deny a sensation of room spinning.  I suggested that he establish care with a PCP to work this up, but he preferred to be seen in this clinic.    I am not currently seeing patients in person for this clinic, so I requested a follow-up visit in person with one of my colleagues.    Please have the patient schedule a follow-up visit with me virtually in 6 months for routine HIV monitoring  (unless of course he desires otherwise).            Bartolo Amor MD, MS  Infectious Disease    Time:  A total of 47 minutes were spent in care of the patient today.   22 minutes were spent on the telephone.  10 minutes were spent on chart review, orders, and care coordination.  15 minutes were spent on documentation on the day of service.

## 2021-04-27 NOTE — PROGRESS NOTES
"  Sara Benjamin is a 50 year old man who is being evaluated via a billable telephone visit.      What phone number would you like to be contacted at? [phone]  How would you like to obtain your AVS? Mail a copy     Phone call duration: 22 minutes    Professional  services were used for this telephone encounter.  I called the [facility]  line [phone].  Language used was Tigrinya.      Reason for visit:   Infectious Disease Return Visit, planned follow-up for HIV    SUBJECTIVE:    Sara Benjamin is a 51 y/o man originally from [facility].   He lives with well controlled HIV infection.  Last labs were over 1 year ago, however, with some delays in return to care due to the COVID pandemic.   1/2020 with HIV viral load not detected, and absolute CD4 1,665.     Sara tells me that he is back working, and \"everything is OK.\"  His wife and children are still in [facility] waiting for the next step in the immigration process (which is an interview of some kind).  He visited them there last year 1/2020 and has not seen them since.  His children are now 8, 10, and 12 years of age.  No sexual activity since this trip over 1 year ago.     He is taking his Biktarvy every day without concerns for adverse effects.   He has not had COVID infection to his knowledge, nor have any close [place] friends or family.  He did have an illness of some kind about 5-6 months ago where he felt extremely dizzy.  If he tried to stand up, he would vomit.  He vomited several times.  A friend came over and gave him something to drink, and he felt better.  Therefore, he never sought medical attention.  Ever since this illness, he has intermittent dizziness that comes and goes.   He denies that the room is spinning, but only that he is dizzy.  No loss of consciousness has occurred, and there is no headache.   No new medications.  Only OTC medication is occasional Advil.    I have reviewed and updated the patient's Past " Medical History, Social History, Family History and Medication List.     Past Medical History:     Sara reports first learning of his HIV diagnosis about 20 years ago in [facility] in about 2001.  Both he and his wife tested positive.  He did not start treatment right away, but was given medications eventually when his wife was pregnant for this first time. (The eldest child is 12 years old). He reports that he and his wife took the same medicine. Wife took medications during each pregnancy and the children were not infected with HIV.   He cannot confirm, but from his description it seems most likely that this was Atripla, or the equivalent as dispensed in [facility].  He also remembers taking another medicine for awhile, and then he could stop that one. Again, he cannot confirm, but this sounds like Bactrim.  He reports being treated for active TB once in [place], and another time for a pneumonia.  He does not recall exact dates, and we do not have this documentation.  Other than that he does not recall any significant illnesses.  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     He came to the [facility] via [place] first about 2013.  He has a brother in [place] and came over for work opportunities.            Sara was treated for latent TB while in [place].  The source documentation is scanned into EPIC as of 10/8/2019.     Clinic:  [facility]  Dates of treatment:  7/22/2015 through 11/18/2015  (4 months)  Medication:  rifampin 600mg po daily   4/9/2015  Quantiferon Gold  Positive  4/20/2015  Chest XR - no signs of active TB  Three sputums were negative for AFB growth, 4/29/2015, 5/3/2015, 5/5/2015     Patient presented to me 2/4/2019 on Descovy (tenofovir alafenamide/emtricitabine) and dolutegravir. He reported taking this regimen for the previous 1 year.  He was tolerating well and  "reported close adherence.  We switched to Biktarvy then to simplify the regimen to a single tablet daily.      OBJECTIVE:    Current Outpatient Medications   Medication     bictegravir-emtricitabine-tenofovir (BIKTARVY) -25 MG per tablet     imiquimod (ALDARA) 5 % external cream     mupirocin (BACTROBAN) 2 % external ointment     SENNA-docusate sodium (SENNA S) 8.6-50 MG tablet     Current Facility-Administered Medications   Medication     lidocaine 1% with EPINEPHrine 1:100,000 injection 3 mL     lidocaine 1% with EPINEPHrine 1:100,000 injection 3 mL       No Known Allergies      Examination via telephone visit:     GENERAL: Patient is alert and does not seem to be in any acute distress    RESPIRATORY:  No cough or labored breathing is noted.  PSYCH: Affect is bright. Speech fluent and appropriate.      The rest of a comprehensive physical examination is deferred due to the public health emergency telephone visit restrictions.      No new labs or imaging to review      ASSESSMENT and PLAN:    History of well controlled HIV infection, overdue for laboratory monitoring.  Patient denies the need for any STI screening.  Will refill Biktarvy now for 30 days, and then give a 6 month prescription if labs are all stable.    Orders Placed This Encounter   Procedures     Comprehensive metabolic panel     CBC with platelets differential     HIV-1 RNA quantitative     T cell subset profile     UA with Microscopic     Lipid Profile       Ongoing intermittent dizziness    The patient expressed a desire to be \"checked out\" in person.  He is wondering about this dizziness, and wants his blood pressure checked.  I doubt that this is related to Biktarvy, or to his HIV given that it is so well controlled with very high absolute CD4.  The initial episode sounded like vertigo potentially, but he does deny a sensation of room spinning.  I suggested that he establish care with a PCP to work this up, but he preferred to be seen in " this clinic.    I am not currently seeing patients in person for this clinic, so I requested a follow-up visit in person with one of my colleagues.    Please have the patient schedule a follow-up visit with me virtually in 6 months for routine HIV monitoring  (unless of course he desires otherwise).            Bartolo Amor MD, MS  Infectious Disease    Time:  A total of 47 minutes were spent in care of the patient today.   22 minutes were spent on the telephone.  10 minutes were spent on chart review, orders, and care coordination.  15 minutes were spent on documentation on the day of service.

## 2021-05-06 DIAGNOSIS — B20 HUMAN IMMUNODEFICIENCY VIRUS (HIV) DISEASE (H): ICD-10-CM

## 2021-05-06 LAB
ALBUMIN SERPL-MCNC: 3.6 G/DL (ref 3.4–5)
ALBUMIN UR-MCNC: NEGATIVE MG/DL
ALP SERPL-CCNC: 86 U/L (ref 40–150)
ALT SERPL W P-5'-P-CCNC: 32 U/L (ref 0–70)
ANION GAP SERPL CALCULATED.3IONS-SCNC: 4 MMOL/L (ref 3–14)
APPEARANCE UR: CLEAR
AST SERPL W P-5'-P-CCNC: 17 U/L (ref 0–45)
BASOPHILS # BLD AUTO: 0.1 10E9/L (ref 0–0.2)
BASOPHILS NFR BLD AUTO: 1 %
BILIRUB SERPL-MCNC: 0.4 MG/DL (ref 0.2–1.3)
BILIRUB UR QL STRIP: NEGATIVE
BUN SERPL-MCNC: 18 MG/DL (ref 7–30)
CALCIUM SERPL-MCNC: 9 MG/DL (ref 8.5–10.1)
CD3 CELLS # BLD: 2128 CELLS/UL (ref 603–2990)
CD3 CELLS NFR BLD: 80 % (ref 49–84)
CD3+CD4+ CELLS # BLD: 1250 CELLS/UL (ref 441–2156)
CD3+CD4+ CELLS NFR BLD: 47 % (ref 28–63)
CD3+CD4+ CELLS/CD3+CD8+ CLL BLD: 1.47 % (ref 1.4–2.6)
CD3+CD8+ CELLS # BLD: 842 CELLS/UL (ref 125–1312)
CD3+CD8+ CELLS NFR BLD: 32 % (ref 10–40)
CHLORIDE SERPL-SCNC: 111 MMOL/L (ref 94–109)
CHOLEST SERPL-MCNC: 147 MG/DL
CO2 SERPL-SCNC: 25 MMOL/L (ref 20–32)
COLOR UR AUTO: YELLOW
CREAT SERPL-MCNC: 1 MG/DL (ref 0.66–1.25)
DIFFERENTIAL METHOD BLD: NORMAL
EOSINOPHIL # BLD AUTO: 0.1 10E9/L (ref 0–0.7)
EOSINOPHIL NFR BLD AUTO: 1.2 %
ERYTHROCYTE [DISTWIDTH] IN BLOOD BY AUTOMATED COUNT: 12.9 % (ref 10–15)
GFR SERPL CREATININE-BSD FRML MDRD: 87 ML/MIN/{1.73_M2}
GLUCOSE SERPL-MCNC: 101 MG/DL (ref 70–99)
GLUCOSE UR STRIP-MCNC: NEGATIVE MG/DL
HCT VFR BLD AUTO: 43.7 % (ref 40–53)
HDLC SERPL-MCNC: 24 MG/DL
HGB BLD-MCNC: 14.9 G/DL (ref 13.3–17.7)
HGB UR QL STRIP: NEGATIVE
IFC SPECIMEN: NORMAL
IMM GRANULOCYTES # BLD: 0 10E9/L (ref 0–0.4)
IMM GRANULOCYTES NFR BLD: 0.3 %
KETONES UR STRIP-MCNC: NEGATIVE MG/DL
LDLC SERPL CALC-MCNC: ABNORMAL MG/DL
LEUKOCYTE ESTERASE UR QL STRIP: NEGATIVE
LYMPHOCYTES # BLD AUTO: 2.5 10E9/L (ref 0.8–5.3)
LYMPHOCYTES NFR BLD AUTO: 37.1 %
MCH RBC QN AUTO: 30.5 PG (ref 26.5–33)
MCHC RBC AUTO-ENTMCNC: 34.1 G/DL (ref 31.5–36.5)
MCV RBC AUTO: 90 FL (ref 78–100)
MONOCYTES # BLD AUTO: 0.4 10E9/L (ref 0–1.3)
MONOCYTES NFR BLD AUTO: 5.8 %
MUCOUS THREADS #/AREA URNS LPF: PRESENT /LPF
NEUTROPHILS # BLD AUTO: 3.6 10E9/L (ref 1.6–8.3)
NEUTROPHILS NFR BLD AUTO: 54.6 %
NITRATE UR QL: NEGATIVE
NONHDLC SERPL-MCNC: 123 MG/DL
NRBC # BLD AUTO: 0 10*3/UL
NRBC BLD AUTO-RTO: 0 /100
PH UR STRIP: 5 PH (ref 5–7)
PLATELET # BLD AUTO: 239 10E9/L (ref 150–450)
POTASSIUM SERPL-SCNC: 4.4 MMOL/L (ref 3.4–5.3)
PROT SERPL-MCNC: 7.3 G/DL (ref 6.8–8.8)
RBC # BLD AUTO: 4.88 10E12/L (ref 4.4–5.9)
RBC #/AREA URNS AUTO: 0 /HPF (ref 0–2)
SODIUM SERPL-SCNC: 141 MMOL/L (ref 133–144)
SOURCE: ABNORMAL
SP GR UR STRIP: 1.02 (ref 1–1.03)
SQUAMOUS #/AREA URNS AUTO: <1 /HPF (ref 0–1)
TRIGL SERPL-MCNC: 437 MG/DL
UROBILINOGEN UR STRIP-MCNC: 0 MG/DL (ref 0–2)
WBC # BLD AUTO: 6.7 10E9/L (ref 4–11)
WBC #/AREA URNS AUTO: <1 /HPF (ref 0–5)

## 2021-05-06 PROCEDURE — 87536 HIV-1 QUANT&REVRSE TRNSCRPJ: CPT | Mod: 90 | Performed by: PATHOLOGY

## 2021-05-06 PROCEDURE — 81001 URINALYSIS AUTO W/SCOPE: CPT | Performed by: PATHOLOGY

## 2021-05-06 PROCEDURE — 80061 LIPID PANEL: CPT | Performed by: PATHOLOGY

## 2021-05-06 PROCEDURE — 85025 COMPLETE CBC W/AUTO DIFF WBC: CPT | Performed by: PATHOLOGY

## 2021-05-06 PROCEDURE — 80053 COMPREHEN METABOLIC PANEL: CPT | Performed by: PATHOLOGY

## 2021-05-06 PROCEDURE — 86359 T CELLS TOTAL COUNT: CPT | Mod: 90 | Performed by: PATHOLOGY

## 2021-05-06 PROCEDURE — 36415 COLL VENOUS BLD VENIPUNCTURE: CPT | Performed by: PATHOLOGY

## 2021-05-06 PROCEDURE — 86360 T CELL ABSOLUTE COUNT/RATIO: CPT | Mod: 90 | Performed by: PATHOLOGY

## 2021-05-06 PROCEDURE — 99000 SPECIMEN HANDLING OFFICE-LAB: CPT | Performed by: PATHOLOGY

## 2021-06-02 ENCOUNTER — OFFICE VISIT (OUTPATIENT)
Dept: INFECTIOUS DISEASES | Facility: CLINIC | Age: 51
End: 2021-06-02
Attending: STUDENT IN AN ORGANIZED HEALTH CARE EDUCATION/TRAINING PROGRAM
Payer: COMMERCIAL

## 2021-06-02 VITALS
SYSTOLIC BLOOD PRESSURE: 112 MMHG | HEART RATE: 84 BPM | BODY MASS INDEX: 26.52 KG/M2 | DIASTOLIC BLOOD PRESSURE: 74 MMHG | OXYGEN SATURATION: 98 % | WEIGHT: 174.4 LBS | TEMPERATURE: 97.3 F

## 2021-06-02 DIAGNOSIS — B20 HUMAN IMMUNODEFICIENCY VIRUS (HIV) DISEASE (H): Primary | ICD-10-CM

## 2021-06-02 PROCEDURE — 99213 OFFICE O/P EST LOW 20 MIN: CPT | Performed by: STUDENT IN AN ORGANIZED HEALTH CARE EDUCATION/TRAINING PROGRAM

## 2021-06-02 ASSESSMENT — PAIN SCALES - GENERAL: PAINLEVEL: NO PAIN (0)

## 2021-06-02 NOTE — PROGRESS NOTES
" [facility]  Dr. Clark Hsieh, [facility], Floor 3  [address] [id number]   Patient:  Sara Benjamin, Date of birth 1970, Medical record number [id number]  Date of Visit:  06/02/2021         Assessment and Recommendations:   Problem List  1. Dizziness, non-vertiginous, episodic  2. HIV, well-controlled    Assessment and Plan  Discussed Mr. Benjamin's dizziness with him, seems it occurs most commonly when he has been sitting or lying down for a long period of time and then changes position. He states that for the past two months, these dizziness episodes have been decreasing in frequency and severity. He denies \"room spinning\" sensation, denies concurrent dyspnea, chest pain, headache, change in vision. He does state that he has been drinking much less water x several months and that drinking water does tend to decrease his symptoms. He works as a  and [place] but denies any significant noxious chemical exposures. Given all of this, suspect there is an element of dehydration and/or BPPV at play here. Of note, blood pressure was great today (112/78).     Most recent CD4 1250 (5/6/21) and HIV VL undetectable (5/6/21).    1) Recommended increasing fluid intake throughout the day and seeing if this alleviates symptoms  2) Recommended getting BP cuff to occasionally check BP at home, particularly when having a dizzy spell.  3) Follow-up with Dr. Amor in 3 weeks (via telephone) to see if symptoms are relieved with increased fluid intake, otherwise may benefit from neuro eval.  4) Continue Biktarvy. Reviewed most recent labs with him.    Clark Hsieh MD  Division of Infectious Diseases and International Medicine  P: [phone]       History of Infectious Disease Illness:   Sara reports first learning of his HIV diagnosis about 20 years ago in [facility] in about 2001.  Both he and his wife tested positive.  He " did not start treatment right away, but was given medications eventually when his wife was pregnant for this first time. (The eldest child is 12 years old). He reports that he and his wife took the same medicine. Wife took medications during each pregnancy and the children were not infected with HIV.   He cannot confirm, but from his description it seems most likely that this was Atripla, or the equivalent as dispensed in [place].  He also remembers taking another medicine for awhile, and then he could stop that one. Again, he cannot confirm, but this sounds like Bactrim.  He reports being treated for active TB once in [place], and another time for a pneumonia.  He does not recall exact dates, and we do not have this documentation.  Other than that he does not recall any significant illnesses.  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     He came to the [facility] via [place] first about 2013.  He has a brother in [place] and came over for work opportunities.         Sara was treated for latent TB while in [place].  The source documentation is scanned into EPIC as of 10/8/2019.     Clinic:  [facility]  Dates of treatment:  7/22/2015 through 11/18/2015  (4 months)  Medication:  rifampin 600mg po daily   4/9/2015  Quantiferon Gold  Positive  4/20/2015  Chest XR - no signs of active TB  Three sputums were negative for AFB growth, 4/29/2015, 5/3/2015, 5/5/2015     Patient presented to Dr. Bartolo Amor on 2/4/2019 on Descovy (tenofovir alafenamide/emtricitabine) and dolutegravir. He reported taking this regimen for the previous 1 year.  He was tolerating well and reported close adherence.  We switched to Biktarvy then to simplify the regimen to a single tablet daily.        Past Medical and Surgical History:     Past Medical History:   Diagnosis Date     Condyloma acuminata       HIV (human immunodeficiency virus infection) (H)     on Biktarvy       No past surgical history on file.        Family History:   No family history on file.        Social History:     Social History     Tobacco Use     Smoking status: Former Smoker     Smokeless tobacco: Never Used   Substance Use Topics     Alcohol use: Not Currently     Drug use: Not Currently     Social History     Social History Narrative     Not on file          Review of Systems:   CONSTITUTIONAL:  No fevers or chills  EYES: negative for icterus  ENT:  negative for hearing loss, tinnitus, sore throat  RESPIRATORY:  negative for cough, sputum or dyspnea  CARDIOVASCULAR:  negative for chest pain, palpitations  GASTROINTESTINAL:  negative for nausea, vomiting, diarrhea or constipation  GENITOURINARY:  negative for dysuria  HEME:  No easy bruising  INTEGUMENT:  negative for rash or pruritus  NEURO:  Negative for headache. Episodes of dizziness without room-spinning sensation, decreasing for last 2 months (mostly occurring when changing position).         Current Medications:     Current Outpatient Medications   Medication Sig Dispense Refill     bictegravir-emtricitabine-tenofovir (BIKTARVY) -25 MG per tablet Take 1 tablet by mouth daily 30 tablet 0     mupirocin (BACTROBAN) 2 % external ointment Use 2 times a day to affected area behind ear for 1 week 30 g 1     SENNA-docusate sodium (SENNA S) 8.6-50 MG tablet Take 1 tablet by mouth 2 times daily 60 tablet 11     imiquimod (ALDARA) 5 % external cream Apply topically five times a week (Patient not taking: Reported on 6/2/2021) 48 packet 5            Immunization History:     Immunization History   Administered Date(s) Administered     HepB-Adult 01/29/2015, 04/09/2015, 07/23/2015, 12/07/2016     HepB-Dialysis 05/12/2016, 07/14/2016     Influenza Quad, Recombinant, p-free (RIV4) 10/08/2019     Influenza Vaccine, 6+MO IM (QUADRIVALENT W/PRESERVATIVES) 12/06/2017     Meningococcal (Menveo )  03/20/2018, 04/29/2019     Pneumo Conj 13-V (2010&after) 04/09/2015     Pneumococcal 23 valent 01/29/2015     Tdap (Adult) Unspecified Formulation 12/30/2014            Allergies:   No Known Allergies         Physical Exam:   Vital signs:  /74   Pulse 84   Temp 97.3  F (36.3  C)   Wt 79.1 kg (174 lb 6.4 oz)   SpO2 98%   BMI 26.52 kg/m      Physical Examination:  GENERAL:  well-developed, well-nourished, seated in no acute distress.  HEENT:  Head is normocephalic, atraumatic   EYES:  Eyes have anicteric sclerae without conjunctival injection   ENT:  Oropharynx is moist without exudates or ulcers. Tongue is midline  NECK:  Supple. No cervical lymphadenopathy  LUNGS:  Clear to auscultation bilateral.   CARDIOVASCULAR:  Regular rate and rhythm with no murmurs, gallops or rubs.  ABDOMEN:  Normal bowel sounds, soft, nontender. No appreciable hepatosplenomegaly.  SKIN:  No acute rashes.    NEUROLOGIC:  Grossly nonfocal. Active x4 extremities         Laboratory Data:     Metabolic Studies   Sodium   Date Value Ref Range Status   05/06/2021 141 133 - 144 mmol/L Final   01/07/2020 139 133 - 144 mmol/L Final     Potassium   Date Value Ref Range Status   05/06/2021 4.4 3.4 - 5.3 mmol/L Final   01/07/2020 3.8 3.4 - 5.3 mmol/L Final     Chloride   Date Value Ref Range Status   05/06/2021 111 (H) 94 - 109 mmol/L Final   01/07/2020 110 (H) 94 - 109 mmol/L Final     Carbon Dioxide   Date Value Ref Range Status   05/06/2021 25 20 - 32 mmol/L Final   01/07/2020 25 20 - 32 mmol/L Final     Anion Gap   Date Value Ref Range Status   05/06/2021 4 3 - 14 mmol/L Final   01/07/2020 4 3 - 14 mmol/L Final     Urea Nitrogen   Date Value Ref Range Status   05/06/2021 18 7 - 30 mg/dL Final   01/07/2020 14 7 - 30 mg/dL Final     Creatinine   Date Value Ref Range Status   05/06/2021 1.00 0.66 - 1.25 mg/dL Final   01/07/2020 0.85 0.66 - 1.25 mg/dL Final     GFR Estimate   Date Value Ref Range Status   05/06/2021 87 >60 mL/min/[1.73_m2]  Final     Comment:     Non  GFR Calc  Starting 12/18/2018, serum creatinine based estimated GFR (eGFR) will be   calculated using the Chronic Kidney Disease Epidemiology Collaboration   (CKD-EPI) equation.     01/07/2020 >90 >60 mL/min/[1.73_m2] Final     Comment:     Non  GFR Calc  Starting 12/18/2018, serum creatinine based estimated GFR (eGFR) will be   calculated using the Chronic Kidney Disease Epidemiology Collaboration   (CKD-EPI) equation.       Glucose   Date Value Ref Range Status   05/06/2021 101 (H) 70 - 99 mg/dL Final     Comment:     Non Fasting   01/07/2020 79 70 - 99 mg/dL Final     Calcium   Date Value Ref Range Status   05/06/2021 9.0 8.5 - 10.1 mg/dL Final   01/07/2020 9.3 8.5 - 10.1 mg/dL Final     Lactic Acid   Date Value Ref Range Status   03/18/2019 1.7 0.7 - 2.0 mmol/L Final     Hepatic Studies    Bilirubin Total   Date Value Ref Range Status   05/06/2021 0.4 0.2 - 1.3 mg/dL Final   01/07/2020 0.4 0.2 - 1.3 mg/dL Final     Alkaline Phosphatase   Date Value Ref Range Status   05/06/2021 86 40 - 150 U/L Final   01/07/2020 51 40 - 150 U/L Final     Albumin   Date Value Ref Range Status   05/06/2021 3.6 3.4 - 5.0 g/dL Final   01/07/2020 3.9 3.4 - 5.0 g/dL Final     AST   Date Value Ref Range Status   05/06/2021 17 0 - 45 U/L Final   01/07/2020 15 0 - 45 U/L Final     ALT   Date Value Ref Range Status   05/06/2021 32 0 - 70 U/L Final   01/07/2020 35 0 - 70 U/L Final       Hematology Studies      WBC   Date Value Ref Range Status   05/06/2021 6.7 4.0 - 11.0 10e9/L Final   01/07/2020 7.8 4.0 - 11.0 10e9/L Final     Absolute Neutrophil   Date Value Ref Range Status   05/06/2021 3.6 1.6 - 8.3 10e9/L Final   01/07/2020 3.9 1.6 - 8.3 10e9/L Final     Absolute Lymphocytes   Date Value Ref Range Status   05/06/2021 2.5 0.8 - 5.3 10e9/L Final   01/07/2020 3.2 0.8 - 5.3 10e9/L Final     Absolute Monocytes   Date Value Ref Range Status   05/06/2021 0.4 0.0 - 1.3 10e9/L Final    01/07/2020 0.5 0.0 - 1.3 10e9/L Final     Absolute Eosinophils   Date Value Ref Range Status   05/06/2021 0.1 0.0 - 0.7 10e9/L Final   01/07/2020 0.1 0.0 - 0.7 10e9/L Final     Hemoglobin   Date Value Ref Range Status   05/06/2021 14.9 13.3 - 17.7 g/dL Final   01/07/2020 14.5 13.3 - 17.7 g/dL Final     Hematocrit   Date Value Ref Range Status   05/06/2021 43.7 40.0 - 53.0 % Final   01/07/2020 42.0 40.0 - 53.0 % Final     Platelet Count   Date Value Ref Range Status   05/06/2021 239 150 - 450 10e9/L Final   01/07/2020 208 150 - 450 10e9/L Final

## 2021-06-02 NOTE — LETTER
"6/2/2021       RE: Sara Benjamin  [address]  [facility] [address]     Dear Colleague,    Thank you for referring your patient, Sara Benjamin, to the [facility] INFECTIOUS DISEASE CLINIC [place] at [facility]. Please see a copy of my visit note below.     [facility]ious Disease [facility]  Dr. Clark Hsieh, [facility], Floor [address]  [address] [place] [address]   Patient:  Sara Benjamin, Date of birth 1970, Medical record number [id number]  Date of Visit:  06/02/2021         Assessment and Recommendations:   Problem List  1. Dizziness, non-vertiginous, episodic  2. HIV, well-controlled    Assessment and Plan  Discussed Mr. Benjamin's dizziness with him, seems it occurs most commonly when he has been sitting or lying down for a long period of time and then changes position. He states that for the past two months, these dizziness episodes have been decreasing in frequency and severity. He denies \"room spinning\" sensation, denies concurrent dyspnea, chest pain, headache, change in vision. He does state that he has been drinking much less water x several months and that drinking water does tend to decrease his symptoms. He works as a  [place] but denies any significant noxious chemical exposures. Given all of this, suspect there is an element of dehydration and/or BPPV at play here. Of note, blood pressure was great today (112/78).     Most recent CD4 1250 (5/6/21) and HIV VL undetectable (5/6/21).    1) Recommended increasing fluid intake throughout the day and seeing if this alleviates symptoms  2) Recommended getting BP cuff to occasionally check BP at home, particularly when having a dizzy spell.  3) Follow-up with Dr. Amor in 3 weeks (via telephone) to see if symptoms are relieved with increased fluid intake, otherwise may benefit from neuro eval.  4) Continue Biktarvy. " Reviewed most recent labs with him.    Clark Hsieh MD  Division of Infectious Diseases and International Medicine  P: [phone]       History of Infectious Disease Illness:   Sara reports first learning of his HIV diagnosis about 20 years ago in [facility] in about 2001.  Both he and his wife tested positive.  He did not start treatment right away, but was given medications eventually when his wife was pregnant for this first time. (The eldest child is 12 years old). He reports that he and his wife took the same medicine. Wife took medications during each pregnancy and the children were not infected with HIV.   He cannot confirm, but from his description it seems most likely that this was Atripla, or the equivalent as dispensed in [facility].  He also remembers taking another medicine for awhile, and then he could stop that one. Again, he cannot confirm, but this sounds like Bactrim.  He reports being treated for active TB once in [place], and another time for a pneumonia.  He does not recall exact dates, and we do not have this documentation.  Other than that he does not recall any significant illnesses.  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     He came to the [facility] via [place], [place] first about 2013.  He has a brother in [place] and came over for work opportunities.         Sara was treated for latent TB while in [place], [place].  The source documentation is scanned into EPIC as of 10/8/2019.     Clinic:  [facility]  Dates of treatment:  7/22/2015 through 11/18/2015  (4 months)  Medication:  rifampin 600mg po daily   4/9/2015  Quantiferon Gold  Positive  4/20/2015  Chest XR - no signs of active TB  Three sputums were negative for AFB growth, 4/29/2015, 5/3/2015, 5/5/2015     Patient presented to Dr. Bartolo Amor on 2/4/2019 on Descovy (tenofovir alafenamide/emtricitabine) and  dolutegravir. He reported taking this regimen for the previous 1 year.  He was tolerating well and reported close adherence.  We switched to Biktarvy then to simplify the regimen to a single tablet daily.        Past Medical and Surgical History:     Past Medical History:   Diagnosis Date     Condyloma acuminata      HIV (human immunodeficiency virus infection) (H)     on Biktarvy       No past surgical history on file.        Family History:   No family history on file.        Social History:     Social History     Tobacco Use     Smoking status: Former Smoker     Smokeless tobacco: Never Used   Substance Use Topics     Alcohol use: Not Currently     Drug use: Not Currently     Social History     Social History Narrative     Not on file          Review of Systems:   CONSTITUTIONAL:  No fevers or chills  EYES: negative for icterus  ENT:  negative for hearing loss, tinnitus, sore throat  RESPIRATORY:  negative for cough, sputum or dyspnea  CARDIOVASCULAR:  negative for chest pain, palpitations  GASTROINTESTINAL:  negative for nausea, vomiting, diarrhea or constipation  GENITOURINARY:  negative for dysuria  HEME:  No easy bruising  INTEGUMENT:  negative for rash or pruritus  NEURO:  Negative for headache. Episodes of dizziness without room-spinning sensation, decreasing for last 2 months (mostly occurring when changing position).         Current Medications:     Current Outpatient Medications   Medication Sig Dispense Refill     bictegravir-emtricitabine-tenofovir (BIKTARVY) -25 MG per tablet Take 1 tablet by mouth daily 30 tablet 0     mupirocin (BACTROBAN) 2 % external ointment Use 2 times a day to affected area behind ear for 1 week 30 g 1     SENNA-docusate sodium (SENNA S) 8.6-50 MG tablet Take 1 tablet by mouth 2 times daily 60 tablet 11     imiquimod (ALDARA) 5 % external cream Apply topically five times a week (Patient not taking: Reported on 6/2/2021) 48 packet 5            Immunization History:      Immunization History   Administered Date(s) Administered     HepB-Adult 01/29/2015, 04/09/2015, 07/23/2015, 12/07/2016     HepB-Dialysis 05/12/2016, 07/14/2016     Influenza Quad, Recombinant, p-free (RIV4) 10/08/2019     Influenza Vaccine, 6+MO IM (QUADRIVALENT W/PRESERVATIVES) 12/06/2017     Meningococcal (Menveo ) 03/20/2018, 04/29/2019     Pneumo Conj 13-V (2010&after) 04/09/2015     Pneumococcal 23 valent 01/29/2015     Tdap (Adult) Unspecified Formulation 12/30/2014            Allergies:   No Known Allergies         Physical Exam:   Vital signs:  /74   Pulse 84   Temp 97.3  F (36.3  C)   Wt 79.1 kg (174 lb 6.4 oz)   SpO2 98%   BMI 26.52 kg/m      Physical Examination:  GENERAL:  well-developed, well-nourished, seated in no acute distress.  HEENT:  Head is normocephalic, atraumatic   EYES:  Eyes have anicteric sclerae without conjunctival injection   ENT:  Oropharynx is moist without exudates or ulcers. Tongue is midline  NECK:  Supple. No cervical lymphadenopathy  LUNGS:  Clear to auscultation bilateral.   CARDIOVASCULAR:  Regular rate and rhythm with no murmurs, gallops or rubs.  ABDOMEN:  Normal bowel sounds, soft, nontender. No appreciable hepatosplenomegaly.  SKIN:  No acute rashes.    NEUROLOGIC:  Grossly nonfocal. Active x4 extremities         Laboratory Data:     Metabolic Studies   Sodium   Date Value Ref Range Status   05/06/2021 141 133 - 144 mmol/L Final   01/07/2020 139 133 - 144 mmol/L Final     Potassium   Date Value Ref Range Status   05/06/2021 4.4 3.4 - 5.3 mmol/L Final   01/07/2020 3.8 3.4 - 5.3 mmol/L Final     Chloride   Date Value Ref Range Status   05/06/2021 111 (H) 94 - 109 mmol/L Final   01/07/2020 110 (H) 94 - 109 mmol/L Final     Carbon Dioxide   Date Value Ref Range Status   05/06/2021 25 20 - 32 mmol/L Final   01/07/2020 25 20 - 32 mmol/L Final     Anion Gap   Date Value Ref Range Status   05/06/2021 4 3 - 14 mmol/L Final   01/07/2020 4 3 - 14 mmol/L Final     Urea  Nitrogen   Date Value Ref Range Status   05/06/2021 18 7 - 30 mg/dL Final   01/07/2020 14 7 - 30 mg/dL Final     Creatinine   Date Value Ref Range Status   05/06/2021 1.00 0.66 - 1.25 mg/dL Final   01/07/2020 0.85 0.66 - 1.25 mg/dL Final     GFR Estimate   Date Value Ref Range Status   05/06/2021 87 >60 mL/min/[1.73_m2] Final     Comment:     Non  GFR Calc  Starting 12/18/2018, serum creatinine based estimated GFR (eGFR) will be   calculated using the Chronic Kidney Disease Epidemiology Collaboration   (CKD-EPI) equation.     01/07/2020 >90 >60 mL/min/[1.73_m2] Final     Comment:     Non  GFR Calc  Starting 12/18/2018, serum creatinine based estimated GFR (eGFR) will be   calculated using the Chronic Kidney Disease Epidemiology Collaboration   (CKD-EPI) equation.       Glucose   Date Value Ref Range Status   05/06/2021 101 (H) 70 - 99 mg/dL Final     Comment:     Non Fasting   01/07/2020 79 70 - 99 mg/dL Final     Calcium   Date Value Ref Range Status   05/06/2021 9.0 8.5 - 10.1 mg/dL Final   01/07/2020 9.3 8.5 - 10.1 mg/dL Final     Lactic Acid   Date Value Ref Range Status   03/18/2019 1.7 0.7 - 2.0 mmol/L Final     Hepatic Studies    Bilirubin Total   Date Value Ref Range Status   05/06/2021 0.4 0.2 - 1.3 mg/dL Final   01/07/2020 0.4 0.2 - 1.3 mg/dL Final     Alkaline Phosphatase   Date Value Ref Range Status   05/06/2021 86 40 - 150 U/L Final   01/07/2020 51 40 - 150 U/L Final     Albumin   Date Value Ref Range Status   05/06/2021 3.6 3.4 - 5.0 g/dL Final   01/07/2020 3.9 3.4 - 5.0 g/dL Final     AST   Date Value Ref Range Status   05/06/2021 17 0 - 45 U/L Final   01/07/2020 15 0 - 45 U/L Final     ALT   Date Value Ref Range Status   05/06/2021 32 0 - 70 U/L Final   01/07/2020 35 0 - 70 U/L Final       Hematology Studies      WBC   Date Value Ref Range Status   05/06/2021 6.7 4.0 - 11.0 10e9/L Final   01/07/2020 7.8 4.0 - 11.0 10e9/L Final     Absolute Neutrophil   Date Value Ref  Range Status   05/06/2021 3.6 1.6 - 8.3 10e9/L Final   01/07/2020 3.9 1.6 - 8.3 10e9/L Final     Absolute Lymphocytes   Date Value Ref Range Status   05/06/2021 2.5 0.8 - 5.3 10e9/L Final   01/07/2020 3.2 0.8 - 5.3 10e9/L Final     Absolute Monocytes   Date Value Ref Range Status   05/06/2021 0.4 0.0 - 1.3 10e9/L Final   01/07/2020 0.5 0.0 - 1.3 10e9/L Final     Absolute Eosinophils   Date Value Ref Range Status   05/06/2021 0.1 0.0 - 0.7 10e9/L Final   01/07/2020 0.1 0.0 - 0.7 10e9/L Final     Hemoglobin   Date Value Ref Range Status   05/06/2021 14.9 13.3 - 17.7 g/dL Final   01/07/2020 14.5 13.3 - 17.7 g/dL Final     Hematocrit   Date Value Ref Range Status   05/06/2021 43.7 40.0 - 53.0 % Final   01/07/2020 42.0 40.0 - 53.0 % Final     Platelet Count   Date Value Ref Range Status   05/06/2021 239 150 - 450 10e9/L Final   01/07/2020 208 150 - 450 10e9/L Final       Again, thank you for allowing me to participate in the care of your patient.      Sincerely,    Clark Hsieh MD

## 2021-06-02 NOTE — NURSING NOTE
"Chief Complaint   Patient presents with     RECHECK     Follow up B20     Vital signs:  Temp: 97.3  F (36.3  C)   BP: 112/74 Pulse: 84     SpO2: 98 %       Weight: 79.1 kg (174 lb 6.4 oz)  Estimated body mass index is 26.52 kg/m  as calculated from the following:    Height as of 1/7/20: 1.727 m (5' 8\").    Weight as of this encounter: 79.1 kg (174 lb 6.4 oz).        Rossana Velásquez, CMA    "

## 2021-06-24 ENCOUNTER — OFFICE VISIT (OUTPATIENT)
Dept: DERMATOLOGY | Facility: CLINIC | Age: 51
End: 2021-06-24
Payer: COMMERCIAL

## 2021-06-24 DIAGNOSIS — A63.0 CONDYLOMA ACUMINATA: ICD-10-CM

## 2021-06-24 PROCEDURE — 54065 DESTRUCTION PENIS LESION(S): CPT | Performed by: DERMATOLOGY

## 2021-06-24 RX ORDER — IMIQUIMOD 12.5 MG/.25G
CREAM TOPICAL
Qty: 48 PACKET | Refills: 5 | Status: SHIPPED | OUTPATIENT
Start: 2021-06-25 | End: 2023-11-01

## 2021-06-24 ASSESSMENT — PAIN SCALES - GENERAL: PAINLEVEL: NO PAIN (0)

## 2021-06-24 NOTE — PROGRESS NOTES
[facility] Dermatology Note  Encounter Date: Jun 24, 2021  Office Visit     Dermatology Problem List:  1. Extensive large condyloma acuminata diffusely distributed in the groin and genital area, in context of HIV (VL undetectable; CD4 >1197; on antiretrovirals)              - multiple shave biopsies performed 3/4/19, 4/5/19 on largest lesions (no SCC identified)              - imiquimod started 3/4/19              - s/p extensive cryotherapy 8/12/19, 9/9/19, 11/4/19, 1/15/2020              - improved on exam 1/15/2020 with residual lesions on the penile shaft and left groin area.                           - LiqN2 1/15/2020 continuing home aldara five time weekly  ____________________________________________    Assessment & Plan:     # Extensive large condyloma acuminata diffusely distributed in the groin and genital area, in context of HIV. Had prior improved with repeat cryotherapy and aldara, but flare since off treatment the last year. Recommended cryotherapy as below and restarting aldara three times per week.   - Cryotherapy performed today (see procedure note(s) below).   - Start aldara TIW    Procedures Performed:   - Cryotherapy procedure note, location(s): scrotum, penile shaft, groin area. After verbal consent and discussion of risks and benefits including, but not limited to, dyspigmentation/scar, blister, and pain, >15 lesion(s) was(were) treated with 1-2 mm freeze border for 1-2 cycles with liquid nitrogen. Post cryotherapy instructions were provided.    Follow-up: 6 week(s) in-person, or earlier for new or changing lesions    Staff:     Clark Gilliland MD  Pronouns: he/him/his    Department of Dermatology  [facility]: Phone: [phone], Fax:[phone]  [facility]: Phone: [phone] Fax: [phone]    ____________________________________________    CC:  Derm Problem (Sara is here today for a follow up on condyloma acuminata. He states that things seem to be better, but continues to have symptoms. )    HPI:  Mr. Sara Benjamin is a(n) 50 year old male who presents today as a return patient for condyloma/warts. Last seen 1/15/2020 when he had cryotherapy and continued on aldara.     He states he has developed many new warts since last visit. He did stop using aldara after last visit and has not been treating for the last year. He states HIV is well controlled and he is currently undetectable.     Patient is otherwise feeling well, without additional skin concerns.     Labs Reviewed:  N/A    Physical Exam:  Vitals: There were no vitals taken for this visit.  SKIN: Focused examination of genital area was performed.  - Numerous (>20) verrucous papules on the bilateral penile shaft, scrotum, and left inguinal area/mons pubis.   - No other lesions of concern on areas examined.     Medications:  Current Outpatient Medications   Medication     bictegravir-emtricitabine-tenofovir (BIKTARVY) -25 MG per tablet     mupirocin (BACTROBAN) 2 % external ointment     SENNA-docusate sodium (SENNA S) 8.6-50 MG tablet     imiquimod (ALDARA) 5 % external cream     Current Facility-Administered Medications   Medication     lidocaine 1% with EPINEPHrine 1:100,000 injection 3 mL     lidocaine 1% with EPINEPHrine 1:100,000 injection 3 mL      Past Medical History:   Patient Active Problem List   Diagnosis     Condyloma acuminata     Human immunodeficiency virus (HIV) disease (H)     Past Medical History:   Diagnosis Date     Condyloma acuminata      HIV (human immunodeficiency virus infection) (H)     on Biktarvy

## 2021-06-24 NOTE — NURSING NOTE
Dermatology Rooming Note    Sara Benjamin's goals for this visit include:   Chief Complaint   Patient presents with     Derm Problem     Sara is here today for a follow up on condyloma acuminata. He states that things seem to be better, but continues to have symptoms.      Mikie Moore, EMT

## 2021-06-24 NOTE — LETTER
6/24/2021       RE: Sara Benjamin  [address]  [facility] [address]     Dear Colleague,    Thank you for referring your patient, Sara Benjamin, to the [facility] DERMATOLOGY CLINIC [place] at [facility]. Please see a copy of my visit note below.    [facility] Dermatology Note  Encounter Date: Jun 24, 2021  Office Visit     Dermatology Problem List:  1. Extensive large condyloma acuminata diffusely distributed in the groin and genital area, in context of HIV (VL undetectable; CD4 >1197; on antiretrovirals)              - multiple shave biopsies performed 3/4/19, 4/5/19 on largest lesions (no SCC identified)              - imiquimod started 3/4/19              - s/p extensive cryotherapy 8/12/19, 9/9/19, 11/4/19, 1/15/2020              - improved on exam 1/15/2020 with residual lesions on the penile shaft and left groin area.                           - LiqN2 1/15/2020 continuing home aldara five time weekly  ____________________________________________    Assessment & Plan:     # Extensive large condyloma acuminata diffusely distributed in the groin and genital area, in context of HIV. Had prior improved with repeat cryotherapy and aldara, but flare since off treatment the last year. Recommended cryotherapy as below and restarting aldara three times per week.   - Cryotherapy performed today (see procedure note(s) below).   - Start aldara TIW    Procedures Performed:   - Cryotherapy procedure note, location(s): scrotum, penile shaft, groin area. After verbal consent and discussion of risks and benefits including, but not limited to, dyspigmentation/scar, blister, and pain, >15 lesion(s) was(were) treated with 1-2 mm freeze border for 1-2 cycles with liquid nitrogen. Post cryotherapy instructions were provided.    Follow-up: 6 week(s) in-person, or earlier for new or changing lesions    Staff:     Clark Gilliland MD  Pronouns:  he/him/his    Department of Dermatology  [facility]: Phone: [phone], Fax:[phone]  [facility]: Phone: [phone] Fax: [phone]    ____________________________________________    CC: Derm Problem (Sara is here today for a follow up on condyloma acuminata. He states that things seem to be better, but continues to have symptoms. )    HPI:  Mr. Sara Benjamin is a(n) 50 year old male who presents today as a return patient for condyloma/warts. Last seen 1/15/2020 when he had cryotherapy and continued on aldara.     He states he has developed many new warts since last visit. He did stop using aldara after last visit and has not been treating for the last year. He states HIV is well controlled and he is currently undetectable.     Patient is otherwise feeling well, without additional skin concerns.     Labs Reviewed:  N/A    Physical Exam:  Vitals: There were no vitals taken for this visit.  SKIN: Focused examination of genital area was performed.  - Numerous (>20) verrucous papules on the bilateral penile shaft, scrotum, and left inguinal area/mons pubis.   - No other lesions of concern on areas examined.     Medications:  Current Outpatient Medications   Medication     bictegravir-emtricitabine-tenofovir (BIKTARVY) -25 MG per tablet     mupirocin (BACTROBAN) 2 % external ointment     SENNA-docusate sodium (SENNA S) 8.6-50 MG tablet     imiquimod (ALDARA) 5 % external cream     Current Facility-Administered Medications   Medication     lidocaine 1% with EPINEPHrine 1:100,000 injection 3 mL     lidocaine 1% with EPINEPHrine 1:100,000 injection 3 mL      Past Medical History:   Patient Active Problem List   Diagnosis     Condyloma acuminata     Human immunodeficiency virus (HIV) disease (H)     Past Medical History:   Diagnosis Date     Condyloma acuminata      HIV (human  immunodeficiency virus infection) (H)     on Biktarvy

## 2021-07-20 ENCOUNTER — VIRTUAL VISIT (OUTPATIENT)
Dept: INFECTIOUS DISEASES | Facility: CLINIC | Age: 51
End: 2021-07-20
Attending: INTERNAL MEDICINE
Payer: COMMERCIAL

## 2021-07-20 DIAGNOSIS — B20 HUMAN IMMUNODEFICIENCY VIRUS (HIV) DISEASE (H): Primary | ICD-10-CM

## 2021-07-20 DIAGNOSIS — R42 DIZZINESS OF UNKNOWN CAUSE: ICD-10-CM

## 2021-07-20 DIAGNOSIS — E78.1 HYPERTRIGLYCERIDEMIA: ICD-10-CM

## 2021-07-20 PROCEDURE — 99214 OFFICE O/P EST MOD 30 MIN: CPT | Mod: 95 | Performed by: INTERNAL MEDICINE

## 2021-07-20 ASSESSMENT — PAIN SCALES - GENERAL: PAINLEVEL: NO PAIN (0)

## 2021-07-20 NOTE — PROGRESS NOTES
"    Sara Benjamin is a 50 year old man who is being evaluated via a billable video visit.      How would you like to obtain your AVS? Mail a copy  If the video visit is dropped, the invitation should be resent by: Text to cell phone: [phone]  Will anyone else be joining your video visit? No       Sara requested we use Doximity today, as he had trouble with AmWell in the past.    I used a professional  via the [employer] telephone line for the language Tigrinya.    Reason for visit:   Infectious Disease Return Visit, planned follow-up for HIV    SUBJECTIVE:    Sara Benjamin is a 51 y/o man originally from [facility].   He lives with well controlled HIV infection.  Last labs were 5/6/2021 with HIV viral load not detected, and absolute CD4 1,250.   He continues to have close daily adherence to his Biktarvy, and no reports of side effects.    Today's appointment was scheduled to follow-up dizziness specifically.  He was evaluated in person by Dr. Hsieh, my colleague int ID clinic,and volume status and orthostatic hypotension was thought to be the most likely cause.    Dizziness did resolve, and Sara thinks increasing his fluid intake was the fix.   He has not had any recent dizziness or headaches.   He is feeling quite well.    He also had an interval visit with the Dermatologist, and the condylomas are much better, nearly gone with topical imiquimod treatment and cryotherapy.    Sara tells me that he is back working, and \"everything is OK.\"  His wife and children are still in [facility] waiting for the next step in the immigration process (which is an interview of some kind).  He visited them there last year 1/2020 and has not seen them since.  His children are now 8, 10, and 12 years of age.  No sexual activity since this trip now 1.5 years ago.       I have reviewed and updated the patient's Past Medical History, Social History, Family History and Medication List.     Past Medical " History:     Sara reports first learning of his HIV diagnosis about 20 years ago in [facility] in about 2001.  Both he and his wife tested positive.  He did not start treatment right away, but was given medications eventually when his wife was pregnant for this first time. (The eldest child is 12 years old). He reports that he and his wife took the same medicine. Wife took medications during each pregnancy and the children were not infected with HIV.   He cannot confirm, but from his description it seems most likely that this was Atripla, or the equivalent as dispensed in [facility].  He also remembers taking another medicine for awhile, and then he could stop that one. Again, he cannot confirm, but this sounds like Bactrim.  He reports being treated for active TB once in [place], and another time for a pneumonia.  He does not recall exact dates, and we do not have this documentation.  Other than that he does not recall any significant illnesses.  He has longstanding HPV-related condyloma acuminata skin lesions in his groin and genitals that were seen here at [facility] by Dermatology.     He came to the [facility] via [place] first about 2013.  He has a brother in [place] and came over for work opportunities.            Sara was treated for latent TB while in [place].  The source documentation is scanned into EPIC as of 10/8/2019.     Clinic:  [facility]  Dates of treatment:  7/22/2015 through 11/18/2015  (4 months)  Medication:  rifampin 600mg po daily   4/9/2015  Quantiferon Gold  Positive  4/20/2015  Chest XR - no signs of active TB  Three sputums were negative for AFB growth, 4/29/2015, 5/3/2015, 5/5/2015     Patient presented to me 2/4/2019 on Descovy (tenofovir alafenamide/emtricitabine) and dolutegravir. He reported taking this regimen for the previous 1 year.  He was tolerating well and reported close adherence.  We switched to Biktarvy then to simplify the regimen to a  single tablet daily.        OBJECTIVE:         Current Outpatient Medications   Medication     bictegravir-emtricitabine-tenofovir (BIKTARVY) -25 MG per tablet     imiquimod (ALDARA) 5 % external cream     mupirocin (BACTROBAN) 2 % external ointment     SENNA-docusate sodium (SENNA S) 8.6-50 MG tablet          Current Facility-Administered Medications   Medication     lidocaine 1% with EPINEPHrine 1:100,000 injection 3 mL     lidocaine 1% with EPINEPHrine 1:100,000 injection 3 mL         No Known Allergies        Examination via telephone visit:     GENERAL: Patient is alert and does not seem to be in any acute distress    RESPIRATORY:  No cough or labored breathing is noted.  PSYCH: Affect is bright. Speech fluent and appropriate.      The rest of a comprehensive physical examination is deferred due to the public health emergency telephone visit restrictions.        Recent labs    5/6/2021  HIV viral load - not detected  Absolute CD4 1250  CBC with Diff all OK  CMP with glucose 101, non fasting  Chloride 111  Urinalysis without proteinuria  Triglycerides 437, could not calculate LDL with this degree of triglyceridemia        ASSESSMENT and PLAN:     History of well controlled HIV infection    Doing well on Biktarvy without concerns for side effects.  HIV viral load not detected.  Absolute CD4 1250, high and stable.   Labs q6months is appropriate for monitoring.  Per guidelines, CD4 monitoring is not needed in this case.  However, I find the majority of my patients expect this to be checked and followed, and feel reassured with stable numbers.      Biktarvy refilled x 4 months to get us to the next lab interval.       Orders Placed This Encounter   Procedures     Comprehensive metabolic panel     CBC with platelets differential     HIV-1 RNA quantitative     T cell subset profile         Intermittent dizziness, resolved     Initial episode sounded most consistent with vertigo when he felt extremely dizzy and  vomited if he tried to stand up.  However, subsequent intermittent episodes are more consistent with orthostatic hypotension and lower volume status.   Resolved now with increased fluid intake.  Will continue to check in with the patient about this.    I would consider an inner ear issue/benign paroxysmal positional vertigo if this were to return.    Hypertriglyceridemia    Very high at 437 on last check, and not able to calculate LDL.  Will recheck fasting with next labs.  Given the dizziness and the high triglycerides, it did also make me wonder if they could be related.  Triglycerides in themselves are not necessarily atherogenic.  Will need to get LDL as well to sort this out.    Biktarvy may contribute to hyperlipidemia and hypertriglyceridemia but would not be considered the worst of the ARV culprits.    Last checked 10/8/2019 with LDL 68 and TGs 198.  Switched to Biktarvy earlier that same year.  Those are the only two data points we have for cholesterol.    Orders Placed This Encounter   Procedures     Lipid Profile         Video-Visit Details    Type of service:  Video Visit    Video Start Time: 1:50 PM  Video End Time:  2:05 PM    Originating Location (pt. Location): Home    Distant Location (provider location):  [facility] INFECTI[facility] CLINIC [place]     Platform used for Video Visit: Lily Amor MD, MS  Infectious Disease    Time:  A total of 25 minutes was spent in care of the patient today. 15 minutes were spent on the video, with an additional 10 minutes spent on chart review, orders, and care coordination.    [facility] billing:  Level 4    (3 total problems)  1 chronic stable problem  1 acute problem, resolved  1 new problem, not yet diagnosed    Reviewed >3 labs  Ordered >3 labs  Prescription drug management

## 2021-07-20 NOTE — LETTER
"7/20/2021       RE: Sara Benjamin  [address]  [facility] [address]     Dear Colleague,    Thank you for referring your patient, Sara Benjamin, to the [facility]ECTI[facility] CLINIC [facility]. Please see a copy of my visit note below.        Sara Benjamin is a 50 year old man who is being evaluated via a billable video visit.      How would you like to obtain your AVS? Mail a copy  If the video visit is dropped, the invitation should be resent by: Text to cell phone: [phone]  Will anyone else be joining your video visit? No       Sara requested we use Doximity today, as he had trouble with AmWell in the past.    I used a professional  via the [employer] telephone line for the language Tigrinya.    Reason for visit:   Infectious Disease Return Visit, planned follow-up for HIV    SUBJECTIVE:    Sara Benjamin is a 51 y/o man originally from [facility].   He lives with well controlled HIV infection.  Last labs were 5/6/2021 with HIV viral load not detected, and absolute CD4 1,250.   He continues to have close daily adherence to his Biktarvy, and no reports of side effects.    Today's appointment was scheduled to follow-up dizziness specifically.  He was evaluated in person by Dr. Hsieh, my colleague inthe ID clinic,and volume status and orthostatic hypotension was thought to be the most likely cause.    Dizziness did resolve, and Sara thinks increasing his fluid intake was the fix.   He has not had any recent dizziness or headaches.   He is feeling quite well.    He also had an interval visit with the Dermatologist, and the condylomas are much better, nearly gone with topical imiquimod treatment and cryotherapy.    Sara tells me that he is back working, and \"everything is OK.\"  His wife and children are still in [place] waiting for the next step in the immigration process (which is an interview of some kind).  He " visited them there last year 1/2020 and has not seen them since.  His children are now 8, 10, and 12 years of age.  No sexual activity since this trip now 1.5 years ago.       I have reviewed and updated the patient's Past Medical History, Social History, Family History and Medication List.     Past Medical History:     Sara reports first learning of his HIV diagnosis about 20 years ago in [facility] in about 2001.  Both he and his wife tested positive.  He did not start treatment right away, but was given medications eventually when his wife was pregnant for this first time. (The eldest child is 12 years old). He reports that he and his wife took the same medicine. Wife took medications during each pregnancy and the children were not infected with HIV.   He cannot confirm, but from his description it seems most likely that this was Atripla, or the equivalent as dispensed in [facility].  He also remembers taking another medicine for awhile, and then he could stop that one. Again, he cannot confirm, but this sounds like Bactrim.  He reports being treated for active TB once in [place], and another time for a pneumonia.  He does not recall exact dates, and we do not have this documentation.  Other than that he does not recall any significant illnesses.  He has longstanding HPV-related condyloma acuminata skin lesions in his groin and genitals that were seen here at [facility] by Dermatology.     He came to the [facility] via [place] first about 2013.  He has a brother in [place] and came over for work opportunities.            Sara was treated for latent TB while in [place].  The source documentation is scanned into EPIC as of 10/8/2019.     Clinic:  [facility]  Dates of treatment:  7/22/2015 through 11/18/2015  (4 months)  Medication:  rifampin 600mg po daily   4/9/2015  Quantiferon Gold  Positive  4/20/2015  Chest XR - no signs of active TB  Three sputums were negative for AFB  growth, 4/29/2015, 5/3/2015, 5/5/2015     Patient presented to me 2/4/2019 on Descovy (tenofovir alafenamide/emtricitabine) and dolutegravir. He reported taking this regimen for the previous 1 year.  He was tolerating well and reported close adherence.  We switched to Biktarvy then to simplify the regimen to a single tablet daily.        OBJECTIVE:         Current Outpatient Medications   Medication     bictegravir-emtricitabine-tenofovir (BIKTARVY) -25 MG per tablet     imiquimod (ALDARA) 5 % external cream     mupirocin (BACTROBAN) 2 % external ointment     SENNA-docusate sodium (SENNA S) 8.6-50 MG tablet          Current Facility-Administered Medications   Medication     lidocaine 1% with EPINEPHrine 1:100,000 injection 3 mL     lidocaine 1% with EPINEPHrine 1:100,000 injection 3 mL         No Known Allergies        Examination via telephone visit:     GENERAL: Patient is alert and does not seem to be in any acute distress    RESPIRATORY:  No cough or labored breathing is noted.  PSYCH: Affect is bright. Speech fluent and appropriate.      The rest of a comprehensive physical examination is deferred due to the public health emergency telephone visit restrictions.        Recent labs    5/6/2021  HIV viral load - not detected  Absolute CD4 1250  CBC with Diff all OK  CMP with glucose 101, non fasting  Chloride 111  Urinalysis without proteinuria  Triglycerides 437, could not calculate LDL with this degree of triglyceridemia        ASSESSMENT and PLAN:     History of well controlled HIV infection    Doing well on Biktarvy without concerns for side effects.  HIV viral load not detected.  Absolute CD4 1250, high and stable.   Labs q6months is appropriate for monitoring.  Per guidelines, CD4 monitoring is not needed in this case.  However, I find the majority of my patients expect this to be checked and followed, and feel reassured with stable numbers.      Biktarvy refilled x 4 months to get us to the next lab  interval.       Orders Placed This Encounter   Procedures     Comprehensive metabolic panel     CBC with platelets differential     HIV-1 RNA quantitative     T cell subset profile         Intermittent dizziness, resolved     Initial episode sounded most consistent with vertigo when he felt extremely dizzy and vomited if he tried to stand up.  However, subsequent intermittent episodes are more consistent with orthostatic hypotension and lower volume status.   Resolved now with increased fluid intake.  Will continue to check in with the patient about this.    I would consider an inner ear issue/benign paroxysmal positional vertigo if this were to return.    Hypertriglyceridemia    Very high at 437 on last check, and not able to calculate LDL.  Will recheck fasting with next labs.  Given the dizziness and the high triglycerides, it did also make me wonder if they could be related.  Triglycerides in themselves are not necessarily atherogenic.  Will need to get LDL as well to sort this out.    Biktarvy may contribute to hyperlipidemia and hypertriglyceridemia but would not be considered the worst of the ARV culprits.    Last checked 10/8/2019 with LDL 68 and TGs 198.  Switched to Biktarvy earlier that same year.  Those are the only two data points we have for cholesterol.    Orders Placed This Encounter   Procedures     Lipid Profile         Video-Visit Details    Type of service:  Video Visit    Video Start Time: 1:50 PM  Video End Time:  2:05 PM    Originating Location (pt. Location): Home    Distant Location (provider location):  [facility] INFECTIOUS DISEASE CLINIC [place]     Platform used for Video Visit: Lily Amor MD, MS  Infectious Disease    Time:  A total of 25 minutes was spent in care of the patient today. 15 minutes were spent on the video, with an additional 10 minutes spent on chart review, orders, and care coordination.    [facility] billing:  Level 4    (3 total problems)  1  chronic stable problem  1 acute problem, resolved  1 new problem, not yet diagnosed    Reviewed >3 labs  Ordered >3 labs  Prescription drug management

## 2021-08-11 ENCOUNTER — OFFICE VISIT (OUTPATIENT)
Dept: DERMATOLOGY | Facility: CLINIC | Age: 51
End: 2021-08-11
Payer: COMMERCIAL

## 2021-08-11 DIAGNOSIS — A63.0 CONDYLOMA: Primary | ICD-10-CM

## 2021-08-11 PROCEDURE — 99213 OFFICE O/P EST LOW 20 MIN: CPT | Performed by: DERMATOLOGY

## 2021-08-11 ASSESSMENT — PAIN SCALES - GENERAL: PAINLEVEL: NO PAIN (0)

## 2021-08-11 NOTE — LETTER
8/11/2021       RE: Sara Benjamin  [address]  [place] [address]     Dear Colleague,    Thank you for referring your patient, Sara Benjamin, to the [facility] DERMATOLOGY CLINIC [place] at [facility]. Please see a copy of my visit note below.    [facility] Dermatology Note  Encounter Date: Aug 11, 2021  Office Visit     Dermatology Problem List:  #. Extensive large condyloma acuminata diffusely distributed in the groin and genital area, in context of HIV (VL undetectable; CD4 >1197; on antiretrovirals)  - multiple shave biopsies performed 3/4/19, 4/5/19 on largest lesions (no SCC identified)  - imiquimod started 3/4/19  - s/p extensive cryotherapy 8/12/19, 9/9/19, 11/4/19, 1/15/2020  - improved on exam 1/15/2020 with residual lesions on the penile shaft and left groin area.    - LiqN2 1/15/2020.   - Continuing home Aldara three times per week 8/11/2021.  ____________________________________________    Assessment & Plan:    #. Extensive condyloma acuminata diffusely distributed in the groin and genital area, in context of HIV. No active lesions on exam 8/11/2021.   - Continue Aldara cream three times per week for 2 additional months, then discontinue if not developing any new lesions.    - Due to patient developing irritation from the Aldara cream, I recommended to use a thinner layer when applying (1/2 packet)    Procedures Performed:   None    Follow-up: prn for new or changing lesions    Staff and Scribe:     Provider Disclosure:   The documentation recorded by the scribe accurately reflects the services I personally performed and the decisions made by me.    Clark Gilliland MD    Department of Dermatology  [facility]: Phone: [phone], Fax:[phone]  [facility]: Phone: [phone] Fax:  [phone]    Scribe Disclosure:  I, Karina Padilla, am serving as a scribe to document services personally performed by Clark Gilliland MD based on data collection and the provider's statements to me.   ____________________________________________    CC: Derm Problem (Sara states that he is here today for a follow up )      HPI:  Mr. Sara Benjamin is a(n) 50 year old male who presents today as a return patient for wart follow-up.    Last seen by me on 6/24/21 at which time the patient was treated with cryotherapy for >15 large condyloma acuminata. In addition, the patient was also restarted on Aldara three times per week.      Today, the patient reports improvement in his warts and no new warts. The patient endorses occasionally using the cream. He also notes developing irritation to his skin from using the cream.    Patient is otherwise feeling well, without additional skin concerns.    Labs Reviewed:  N/A    Physical Exam:  Vitals: There were no vitals taken for this visit.  SKIN: Focused examination of genital area was performed.  - Hyperpigmented macules on the left mons pubis inguinal area and penial shaft, but no active verrucous lesions appreciated on exam.   - No other lesions of concern on areas examined.     Medications:  Current Outpatient Medications   Medication     bictegravir-emtricitabine-tenofovir (BIKTARVY) -25 MG per tablet     imiquimod (ALDARA) 5 % external cream     mupirocin (BACTROBAN) 2 % external ointment     SENNA-docusate sodium (SENNA S) 8.6-50 MG tablet     Current Facility-Administered Medications   Medication     lidocaine 1% with EPINEPHrine 1:100,000 injection 3 mL     lidocaine 1% with EPINEPHrine 1:100,000 injection 3 mL        Past Medical History:   Patient Active Problem List   Diagnosis     Condyloma acuminata     Human immunodeficiency virus (HIV) disease (H)     Past Medical History:   Diagnosis Date     Condyloma acuminata      HIV (human immunodeficiency virus  infection) (H)     on Biktarvy

## 2021-08-11 NOTE — NURSING NOTE
Dermatology Rooming Note    Sara Benjamin's goals for this visit include:   Chief Complaint   Patient presents with     Derm Problem     Sara states that he is here today for a follow up      Dali Gerardo LPN

## 2021-08-11 NOTE — PROGRESS NOTES
[facility] Dermatology Note  Encounter Date: Aug 11, 2021  Office Visit     Dermatology Problem List:  #. Extensive large condyloma acuminata diffusely distributed in the groin and genital area, in context of HIV (VL undetectable; CD4 >1197; on antiretrovirals)  - multiple shave biopsies performed 3/4/19, 4/5/19 on largest lesions (no SCC identified)  - imiquimod started 3/4/19  - s/p extensive cryotherapy 8/12/19, 9/9/19, 11/4/19, 1/15/2020  - improved on exam 1/15/2020 with residual lesions on the penile shaft and left groin area.    - LiqN2 1/15/2020.   - Continuing home Aldara three times per week 8/11/2021.  ____________________________________________    Assessment & Plan:    #. Extensive condyloma acuminata diffusely distributed in the groin and genital area, in context of HIV. No active lesions on exam 8/11/2021.   - Continue Aldara cream three times per week for 2 additional months, then discontinue if not developing any new lesions.    - Due to patient developing irritation from the Aldara cream, I recommended to use a thinner layer when applying (1/2 packet)    Procedures Performed:   None    Follow-up: prn for new or changing lesions    Staff and Scribe:     Provider Disclosure:   The documentation recorded by the scribe accurately reflects the services I personally performed and the decisions made by me.    Clark Gilliland MD    Department of Dermatology  [facility]: Phone: [phone], Fax:[phone]  [facility]: Phone: [phone] Fax: [phone]    Scribe Disclosure:  I, Karina Padilla, am serving as a scribe to document services personally performed by Clark Gilliland MD based on data collection and the provider's statements to me.   ____________________________________________    CC: Derm Problem (Sara states that he is here today for a follow up  )      HPI:  Mr. Sara Benjamin is a(n) 50 year old male who presents today as a return patient for wart follow-up.    Last seen by me on 6/24/21 at which time the patient was treated with cryotherapy for >15 large condyloma acuminata. In addition, the patient was also restarted on Aldara three times per week.      Today, the patient reports improvement in his warts and no new warts. The patient endorses occasionally using the cream. He also notes developing irritation to his skin from using the cream.    Patient is otherwise feeling well, without additional skin concerns.    Labs Reviewed:  N/A    Physical Exam:  Vitals: There were no vitals taken for this visit.  SKIN: Focused examination of genital area was performed.  - Hyperpigmented macules on the left mons pubis inguinal area and penial shaft, but no active verrucous lesions appreciated on exam.   - No other lesions of concern on areas examined.     Medications:  Current Outpatient Medications   Medication     bictegravir-emtricitabine-tenofovir (BIKTARVY) -25 MG per tablet     imiquimod (ALDARA) 5 % external cream     mupirocin (BACTROBAN) 2 % external ointment     SENNA-docusate sodium (SENNA S) 8.6-50 MG tablet     Current Facility-Administered Medications   Medication     lidocaine 1% with EPINEPHrine 1:100,000 injection 3 mL     lidocaine 1% with EPINEPHrine 1:100,000 injection 3 mL        Past Medical History:   Patient Active Problem List   Diagnosis     Condyloma acuminata     Human immunodeficiency virus (HIV) disease (H)     Past Medical History:   Diagnosis Date     Condyloma acuminata      HIV (human immunodeficiency virus infection) (H)     on Biktarvy

## 2021-10-10 ENCOUNTER — HEALTH MAINTENANCE LETTER (OUTPATIENT)
Age: 51
End: 2021-10-10

## 2021-12-31 ENCOUNTER — LAB (OUTPATIENT)
Dept: LAB | Facility: CLINIC | Age: 51
End: 2021-12-31
Payer: COMMERCIAL

## 2021-12-31 DIAGNOSIS — E78.1 HYPERTRIGLYCERIDEMIA: ICD-10-CM

## 2021-12-31 DIAGNOSIS — B20 HUMAN IMMUNODEFICIENCY VIRUS (HIV) DISEASE (H): ICD-10-CM

## 2021-12-31 LAB
ALBUMIN SERPL-MCNC: 3.7 G/DL (ref 3.4–5)
ALP SERPL-CCNC: 104 U/L (ref 40–150)
ALT SERPL W P-5'-P-CCNC: 34 U/L (ref 0–70)
ANION GAP SERPL CALCULATED.3IONS-SCNC: 10 MMOL/L (ref 3–14)
AST SERPL W P-5'-P-CCNC: 16 U/L (ref 0–45)
BASOPHILS # BLD AUTO: 0.1 10E3/UL (ref 0–0.2)
BASOPHILS NFR BLD AUTO: 1 %
BILIRUB SERPL-MCNC: 0.3 MG/DL (ref 0.2–1.3)
BUN SERPL-MCNC: 10 MG/DL (ref 7–30)
CALCIUM SERPL-MCNC: 9.2 MG/DL (ref 8.5–10.1)
CD3 CELLS # BLD: 1883 CELLS/UL (ref 603–2990)
CD3 CELLS NFR BLD: 80 % (ref 49–84)
CD3+CD4+ CELLS # BLD: 1104 CELLS/UL (ref 441–2156)
CD3+CD4+ CELLS NFR BLD: 47 % (ref 28–63)
CD3+CD4+ CELLS/CD3+CD8+ CLL BLD: 1.52 % (ref 1.4–2.6)
CD3+CD8+ CELLS # BLD: 727 CELLS/UL (ref 125–1312)
CD3+CD8+ CELLS NFR BLD: 31 % (ref 10–40)
CHLORIDE BLD-SCNC: 109 MMOL/L (ref 94–109)
CHOLEST SERPL-MCNC: 162 MG/DL
CO2 SERPL-SCNC: 20 MMOL/L (ref 20–32)
CREAT SERPL-MCNC: 0.86 MG/DL (ref 0.66–1.25)
EOSINOPHIL # BLD AUTO: 0.2 10E3/UL (ref 0–0.7)
EOSINOPHIL NFR BLD AUTO: 2 %
ERYTHROCYTE [DISTWIDTH] IN BLOOD BY AUTOMATED COUNT: 12.9 % (ref 10–15)
FASTING STATUS PATIENT QL REPORTED: YES
GFR SERPL CREATININE-BSD FRML MDRD: >90 ML/MIN/1.73M2
GLUCOSE BLD-MCNC: 124 MG/DL (ref 70–99)
HCT VFR BLD AUTO: 43 % (ref 40–53)
HDLC SERPL-MCNC: 22 MG/DL
HGB BLD-MCNC: 15.1 G/DL (ref 13.3–17.7)
IMM GRANULOCYTES # BLD: 0 10E3/UL
IMM GRANULOCYTES NFR BLD: 0 %
LDLC SERPL CALC-MCNC: ABNORMAL MG/DL
LYMPHOCYTES # BLD AUTO: 2.7 10E3/UL (ref 0.8–5.3)
LYMPHOCYTES NFR BLD AUTO: 34 %
MCH RBC QN AUTO: 30 PG (ref 26.5–33)
MCHC RBC AUTO-ENTMCNC: 35.1 G/DL (ref 31.5–36.5)
MCV RBC AUTO: 86 FL (ref 78–100)
MONOCYTES # BLD AUTO: 0.5 10E3/UL (ref 0–1.3)
MONOCYTES NFR BLD AUTO: 6 %
NEUTROPHILS # BLD AUTO: 4.5 10E3/UL (ref 1.6–8.3)
NEUTROPHILS NFR BLD AUTO: 57 %
NONHDLC SERPL-MCNC: 140 MG/DL
NRBC # BLD AUTO: 0 10E3/UL
NRBC BLD AUTO-RTO: 0 /100
PLATELET # BLD AUTO: 312 10E3/UL (ref 150–450)
POTASSIUM BLD-SCNC: 4.1 MMOL/L (ref 3.4–5.3)
PROT SERPL-MCNC: 7.6 G/DL (ref 6.8–8.8)
RBC # BLD AUTO: 5.03 10E6/UL (ref 4.4–5.9)
SODIUM SERPL-SCNC: 139 MMOL/L (ref 133–144)
T CELL COMMENT: NORMAL
TRIGL SERPL-MCNC: 496 MG/DL
WBC # BLD AUTO: 7.9 10E3/UL (ref 4–11)

## 2021-12-31 PROCEDURE — 85025 COMPLETE CBC W/AUTO DIFF WBC: CPT | Performed by: PATHOLOGY

## 2021-12-31 PROCEDURE — 80053 COMPREHEN METABOLIC PANEL: CPT | Performed by: PATHOLOGY

## 2021-12-31 PROCEDURE — 80061 LIPID PANEL: CPT | Performed by: PATHOLOGY

## 2021-12-31 PROCEDURE — 36415 COLL VENOUS BLD VENIPUNCTURE: CPT | Performed by: PATHOLOGY

## 2021-12-31 PROCEDURE — 86359 T CELLS TOTAL COUNT: CPT | Mod: 90 | Performed by: PATHOLOGY

## 2021-12-31 PROCEDURE — 86360 T CELL ABSOLUTE COUNT/RATIO: CPT | Mod: 90 | Performed by: PATHOLOGY

## 2021-12-31 PROCEDURE — 99000 SPECIMEN HANDLING OFFICE-LAB: CPT | Performed by: PATHOLOGY

## 2021-12-31 PROCEDURE — 87536 HIV-1 QUANT&REVRSE TRNSCRPJ: CPT | Mod: 90 | Performed by: PATHOLOGY

## 2021-12-31 NOTE — TELEPHONE ENCOUNTER
Patient presented to clinic for medication refill. [place]os completed lab work today and one month refill sent to pharmacy. Patient scheduled for follow up appointment 12/15/22 at 1:30 in person with Dr. Amor. Patient verbalized understanding of appt time and date, states no plans to travel at this time.

## 2022-01-04 LAB — HIV1 RNA # PLAS NAA DL=20: NOT DETECTED COPIES/ML

## 2022-01-09 DIAGNOSIS — B20 HUMAN IMMUNODEFICIENCY VIRUS (HIV) DISEASE (H): ICD-10-CM

## 2022-03-15 ENCOUNTER — OFFICE VISIT (OUTPATIENT)
Dept: INFECTIOUS DISEASES | Facility: CLINIC | Age: 52
End: 2022-03-15
Attending: INTERNAL MEDICINE
Payer: COMMERCIAL

## 2022-03-15 VITALS
WEIGHT: 179.1 LBS | HEART RATE: 89 BPM | OXYGEN SATURATION: 96 % | TEMPERATURE: 98 F | SYSTOLIC BLOOD PRESSURE: 122 MMHG | BODY MASS INDEX: 27.23 KG/M2 | DIASTOLIC BLOOD PRESSURE: 80 MMHG

## 2022-03-15 DIAGNOSIS — Z91.09 ENVIRONMENTAL ALLERGIES: ICD-10-CM

## 2022-03-15 DIAGNOSIS — E78.1 HYPERTRIGLYCERIDEMIA: ICD-10-CM

## 2022-03-15 DIAGNOSIS — K59.04 CHRONIC IDIOPATHIC CONSTIPATION: ICD-10-CM

## 2022-03-15 DIAGNOSIS — Z12.11 SCREENING FOR COLON CANCER: ICD-10-CM

## 2022-03-15 DIAGNOSIS — B20 HUMAN IMMUNODEFICIENCY VIRUS (HIV) DISEASE (H): Primary | ICD-10-CM

## 2022-03-15 PROCEDURE — 99214 OFFICE O/P EST MOD 30 MIN: CPT | Performed by: INTERNAL MEDICINE

## 2022-03-15 RX ORDER — SENNA AND DOCUSATE SODIUM 50; 8.6 MG/1; MG/1
1 TABLET, FILM COATED ORAL 2 TIMES DAILY PRN
Qty: 60 TABLET | Refills: 11 | Status: SHIPPED | OUTPATIENT
Start: 2022-03-15

## 2022-03-15 RX ORDER — LORATADINE 10 MG/1
10 TABLET ORAL DAILY
Qty: 30 TABLET | Refills: 11 | Status: SHIPPED | OUTPATIENT
Start: 2022-03-15

## 2022-03-15 ASSESSMENT — PAIN SCALES - GENERAL: PAINLEVEL: NO PAIN (0)

## 2022-03-15 NOTE — LETTER
3/15/2022       RE: Sara Benjamin  [address]  [place] [address]     Dear Colleague,    Thank you for referring your patient, Sara Benjamin, to the [facility] INFECTIOUS DISEASE CLINIC [place] at [facility]. Please see a copy of my visit note below.        INFECTIOUS DISEASE CLINIC NOTE -   RETURN patient for HIV    SUBJECTIVE:    Sara Benjamin is a 50 y/o man originally from [facility].   He lives with well controlled HIV infection.  Last labs were 12/31/2021 with HIV viral load not detected, and absolute CD4 1,104.   He continues to have close daily adherence to his Biktarvy, and no reports of side effects.    Today we are joyous as Sara is introducing me to his wife Jyoti.  She and their three children (aged 13, 11, and 9) have recently immigrated to the  to live with Sara in [place].  They were in [facility] for the last couple of years pursuing the immigration process.   It is a very happy day to see Sara and his wife together.    Sara was last seen in Dermatology for his condylomas on 8/11/2021.  He was still using the Aldara cream, but no further treatments were planned.  Sara tells me that the lesions are now gone, and he has not needed to use the cream lately.  He can return to Dermatology just PRN.    Dizziness improved and has not returned.  Sara feels that increasing his fluid intake was the fix. He has not had any recent headaches.       Today Sara has a couple of minor concerns:  - Detects a foul smell and taste if he clears his throat and produces something, and he thinks this is draining from his sinuses.  This is happening on and off, and he is not sure if it is seasonal.  No fevers, no sinus pain or pressure.  - Will have abdominal pain fairly constant for 2-3 days, and then it will just go away for awhile.  Sometimes it can last for up to 1 week.  No fevers. He has history of diverticulosis and does  get constipated intermittently as well.    I have reviewed and updated the patient's Past Medical History, Social History, Family History and Medication List.    Past Medical History:    Sara reports first learning of his HIV diagnosis about 20 years ago in [facility] in about 2001.  Both he and his wife tested positive.  He did not start treatment right away, but was given medications eventually when his wife was pregnant for this first time. (The eldest child is 13 years old). He reports that he and his wife took the same medicine. Wife took medications during each pregnancy and the children were not infected with HIV.   He cannot confirm, but from his description it seems most likely that this was Atripla, or the equivalent as dispensed in [facility]. He also remembers taking another medicine for awhile, and then he could stop that one. Again, he cannot confirm, but this sounds like Bactrim.  He reports being treated for active TB once in [place], and another time for a pneumonia.  He does not recall exact dates, and we do not have this documentation.  Other than that he does not recall any significant illnesses.  He has longstanding HPV-related condyloma acuminata skin lesions in his groin and genitals that were seen here at [facility] by Dermatology.    He came to the [facility] via [place] first about 2013.  He has a brother in [place] and came over for work opportunities.      Sara was treated for latent TB while in [place].  The source documentation is scanned into EPIC as of 10/8/2019.    Clinic:  [facility]  Dates of treatment:  7/22/2015 through 11/18/2015  (4 months)  Medication:  rifampin 600mg po daily   4/9/2015  Quantiferon Gold  Positive  4/20/2015  Chest XR - no signs of active TB  Three sputums were negative for AFB growth, 4/29/2015, 5/3/2015, 5/5/2015    Patient presented to me 2/4/2019 on Descovy (tenofovir alafenamide/emtricitabine) and dolutegravir. He  reported taking this regimen for the previous 1 year.  He was tolerating well and reported close adherence.  We switched to Biktarvy then to simplify the regimen to a single tablet daily.    OBJECTIVE:    Current Outpatient Medications   Medication     Biktarvy     imiquimod (ALDARA) 5 % external cream     SENNA-docusate sodium (SENNA S) 8.6-50 MG tablet     mupirocin (BACTROBAN) 2 % external ointment      No Known Allergies     Physical Examination:  VITAL SIGNS: /80   Pulse 89   Temp 98  F (36.7  C) (Oral)   Wt 81.2 kg (179 lb 1.6 oz)   SpO2 96%   BMI 27.23 kg/m    GENERAL:   No acute distress    HEENT: No icterus or injection. Oropharynx moist and clear without lesions or exudate.    NECK: Supple and nontender  LYMPH:  No cervical or axillary lymphadenopathy  LUNGS: Clear to ausculation bilaterally without any increased work of breathing  HEART: Regular rate and rhythm and no murmur, gallop or rub    ABDOMEN: Normoactive bowel sounds, soft, nontender, nondistended, no hepatosplenomegaly.    EXTREMITIES: Warm and well perfused without clubbing, cyanosis, or edema  SKIN:  No rashes  NEURO:  Awake, alert, no focal neurologic deficits.  PSYCH: Affect normal. Speech seemingly fluent and appropriate. ( used for [employer])    Recent labs  12/30/2021  HIV viral load - not detected  Absolute CD4 1,104  CBC with Diff all OK  CMP with glucose 124, non fasting    Triglycerides 496, could not calculate LDL with this degree of triglyceridemia    ASSESSMENT and PLAN:    History of well controlled HIV infection    Doing well on Biktarvy without concerns for side effects.  HIV viral load not detected.  Absolute CD4 of 1104 is high and stable.   I am seeing Sara's wife who is establishing care with me today.  We are updating her regimen to 2 drug regimen Dovato, and Sara is also a good candidate for that.  I will prescribe as a switch in his ART regimen to Dovato.    Labs are needed to repeat 1 month after  ART change for HIV viral load, CBC with Diff, CMP.  Per guidelines, CD4 monitoring is not needed in this case.  However, I find the majority of my patients expect this to be checked and followed, and feel reassured with stable numbers.   In fact, Sara asked me what his CD4 was when I had not specifically mentioned yet today.       Hypertriglyceridemia  Very high at 496 on last check, and not able to calculate LDL.  Will recheck fasting with next labs.  Biktarvy may contribute to hyperlipidemia and hypertriglyceridemia but would not be considered the worst of the ARV culprits.    10/8/2019 with LDL 68 and TGs 198.  Switched to Biktarvy earlier that same year.        Intermittent abdominal pain with history of diverticulosis and constipation  Now over 50 years of age  - Will refill senna/docusate for PRN use  - screening colonoscopy referral placed      Post-nasal drip  Suspect environmental allergies.  We will start with some Claritin and see how he responds.      Bartolo Amor MD, MS  Infectious Disease    Time:  A total of 35 minutes was spent in care of the patient today. 25 minutes were spent in the room with the patient on history, examination, and counseling.  10 additional minutes on chart review, orders, and care coordination.        Again, thank you for allowing me to participate in the care of your patient.      Sincerely,    Bartolo Amor MD

## 2022-03-15 NOTE — LETTER
Date:March 28, 2022      Patient was self referred, no letter generated. Do not send.        [facility] Health Information

## 2022-03-16 ENCOUNTER — TELEPHONE (OUTPATIENT)
Dept: GASTROENTEROLOGY | Facility: CLINIC | Age: 52
End: 2022-03-16
Payer: COMMERCIAL

## 2022-03-16 NOTE — PROGRESS NOTES
INFECTIOUS DISEASE CLINIC NOTE -   RETURN patient for HIV    SUBJECTIVE:    Sara Benjamin is a 52 y/o man originally from [facility].   He lives with well controlled HIV infection.  Last labs were 12/31/2021 with HIV viral load not detected, and absolute CD4 1,104.   He continues to have close daily adherence to his Biktarvy, and no reports of side effects.    Today we are joyous as Sara is introducing me to his wife Jyoti.  She and their three children (aged 13, 11, and 9) have recently immigrated to the  to live with Sara in [place].  They were in [facility] for the last couple of years pursuing the immigration process.   It is a very happy day to see Sara and his wife together.    Sara was last seen in Dermatology for his condylomas on 8/11/2021.  He was still using the Aldara cream, but no further treatments were planned.  Sara tells me that the lesions are now gone, and he has not needed to use the cream lately.  He can return to Dermatology just PRN.    Dizziness improved and has not returned.  Sara feels that increasing his fluid intake was the fix. He has not had any recent headaches.       Today Sara has a couple of minor concerns:  - Detects a foul smell and taste if he clears his throat and produces something, and he thinks this is draining from his sinuses.  This is happening on and off, and he is not sure if it is seasonal.  No fevers, no sinus pain or pressure.  - Will have abdominal pain fairly constant for 2-3 days, and then it will just go away for awhile.  Sometimes it can last for up to 1 week.  No fevers. He has history of diverticulosis and does get constipated intermittently as well.    I have reviewed and updated the patient's Past Medical History, Social History, Family History and Medication List.    Past Medical History:    Sara reports first learning of his HIV diagnosis about 20 years ago in [facility] in about 2001.  Both he and his wife tested positive.   He did not start treatment right away, but was given medications eventually when his wife was pregnant for this first time. (The eldest child is 13 years old). He reports that he and his wife took the same medicine. Wife took medications during each pregnancy and the children were not infected with HIV.   He cannot confirm, but from his description it seems most likely that this was Atripla, or the equivalent as dispensed [place]. He also remembers taking another medicine for awhile, and then he could stop that one. Again, he cannot confirm, but this sounds like Bactrim.  He reports being treated for active TB once in [place], and another time for a pneumonia.  He does not recall exact dates, and we do not have this documentation.  Other than that he does not recall any significant illnesses.  He has longstanding HPV-related condyloma acuminata skin lesions in his groin and genitals that were seen here at [facility] by Dermatology.    He came to the [facility] via [place] first about 2013.  He has a brother in [place] and came over for work opportunities.      Sara was treated for latent TB while in [place].  The source documentation is scanned into EPIC as of 10/8/2019.    Clinic:  [facility]  Dates of treatment:  7/22/2015 through 11/18/2015  (4 months)  Medication:  rifampin 600mg po daily   4/9/2015  Quantiferon Gold  Positive  4/20/2015  Chest XR - no signs of active TB  Three sputums were negative for AFB growth, 4/29/2015, 5/3/2015, 5/5/2015    Patient presented to me 2/4/2019 on Descovy (tenofovir alafenamide/emtricitabine) and dolutegravir. He reported taking this regimen for the previous 1 year.  He was tolerating well and reported close adherence.  We switched to Biktarvy then to simplify the regimen to a single tablet daily.    OBJECTIVE:    Current Outpatient Medications   Medication     Biktarvy     imiquimod (ALDARA) 5 % external cream     SENNA-docusate  sodium (SENNA S) 8.6-50 MG tablet     mupirocin (BACTROBAN) 2 % external ointment      No Known Allergies     Physical Examination:  VITAL SIGNS: /80   Pulse 89   Temp 98  F (36.7  C) (Oral)   Wt 81.2 kg (179 lb 1.6 oz)   SpO2 96%   BMI 27.23 kg/m    GENERAL:   No acute distress    HEENT: No icterus or injection. Oropharynx moist and clear without lesions or exudate.    NECK: Supple and nontender  LYMPH:  No cervical or axillary lymphadenopathy  LUNGS: Clear to ausculation bilaterally without any increased work of breathing  HEART: Regular rate and rhythm and no murmur, gallop or rub    ABDOMEN: Normoactive bowel sounds, soft, nontender, nondistended, no hepatosplenomegaly.    EXTREMITIES: Warm and well perfused without clubbing, cyanosis, or edema  SKIN:  No rashes  NEURO:  Awake, alert, no focal neurologic deficits.  PSYCH: Affect normal. Speech seemingly fluent and appropriate. ( used for [employer])    Recent labs  12/30/2021  HIV viral load - not detected  Absolute CD4 1,104  CBC with Diff all OK  CMP with glucose 124, non fasting    Triglycerides 496, could not calculate LDL with this degree of triglyceridemia    ASSESSMENT and PLAN:    History of well controlled HIV infection    Doing well on Biktarvy without concerns for side effects.  HIV viral load not detected.  Absolute CD4 of 1104 is high and stable.   I am seeing Sara's wife who is establishing care with me today.  We are updating her regimen to 2 drug regimen Dovato, and Sara is also a good candidate for that.  I will prescribe as a switch in his ART regimen to Dovato.    Labs are needed to repeat 1 month after ART change for HIV viral load, CBC with Diff, CMP.  Per guidelines, CD4 monitoring is not needed in this case.  However, I find the majority of my patients expect this to be checked and followed, and feel reassured with stable numbers.   In fact, Sara asked me what his CD4 was when I had not specifically mentioned yet  today.       Hypertriglyceridemia  Very high at 496 on last check, and not able to calculate LDL.  Will recheck fasting with next labs.  Biktarvy may contribute to hyperlipidemia and hypertriglyceridemia but would not be considered the worst of the ARV culprits.    10/8/2019 with LDL 68 and TGs 198.  Switched to Biktarvy earlier that same year.        Intermittent abdominal pain with history of diverticulosis and constipation  Now over 49 years of age  - Will refill senna/docusate for PRN use  - screening colonoscopy referral placed      Post-nasal drip  Suspect environmental allergies.  We will start with some Claritin and see how he responds.      Bartolo Amor MD, MS  Infectious Disease    Time:  A total of 35 minutes was spent in care of the patient today. 25 minutes were spent in the room with the patient on history, examination, and counseling.  10 additional minutes on chart review, orders, and care coordination.

## 2022-04-05 ENCOUNTER — TELEPHONE (OUTPATIENT)
Dept: GASTROENTEROLOGY | Facility: CLINIC | Age: 52
End: 2022-04-05
Payer: COMMERCIAL

## 2022-04-05 NOTE — TELEPHONE ENCOUNTER
Screening Questions    BlueKIND OF PREP RedLOCATION [review exclusion criteria] GreenSEDATION TYPE    1. Are you active on mychart? 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 What insurance is in the chart? [employer]      3.   Ordering/Referring Provider: Bartolo Amor MD    4. BMI   (If greater than 40 review exclusion criteria [PAC APPT IF [MAC] @ [facility])  28.0  [If yes, BMI OVER 40-EXTENDED PREP]      5. Have you had a positive covid test in the last 90 days?   N     6.  Are you currently on dialysis?   N [ If yes, G-PREP & HOSPITAL setting ONLY]     7.  Do you have chronic kidney disease?  N [ If yes, G-PREP ]    8.   Do you have a diagnosis of diabetes?   N   [ If yes, G-PREP ]    9.  On a regular basis do you go 3-5 days between bowel movements?   N   [ If yes, EXTENDED PREP]    10.  Are you taking any prescription pain medications on a routine schedule?    N  [ If yes, EXTENDED PREP] [If yes, MAC]      11.   Do you have any chemical dependencies such as alcohol, street drugs, or methadone?    N [If yes, MAC]    12.   Do you have any history of post-traumatic stress syndrome, severe anxiety or history of psychosis?    N  [If yes, MAC]    13.   Do you have a significant intellectual disability?    N [If yes, MAC]    14.  [FEMALES] Are you currently pregnant?     If yes, how many weeks?       Respiratory Screening:  [If yes to any of the following HOSPITAL setting only]     15. Do you have Pulmonary Hypertension [Lungs]?   N       16. Do you have UNCONTROLLED asthma?   N     17. Do you use daily home oxygen?   N     18. Do you have mod to severe Obstructive Sleep Apnea?           (OKAY @ [facility]  [facility]  SH  PH  RI  MG - if pt is not on OXYGEN)  N         19.   Have you had a heart or lung transplant?   N      20.   Have you had a stroke or Transient ischemic attack (TIA - aka  mini stroke ) within 6 months?  (If yes, please review exclusion criteria)  N     Additional comments:     CALL DROPPED -- AWAITING PT CALL BACK TO COMPLETE  SCREENING AND SCHEDULE.    NH

## 2022-04-18 ENCOUNTER — LAB (OUTPATIENT)
Dept: LAB | Facility: CLINIC | Age: 52
End: 2022-04-18
Attending: INTERNAL MEDICINE
Payer: COMMERCIAL

## 2022-04-18 DIAGNOSIS — B20 HUMAN IMMUNODEFICIENCY VIRUS (HIV) DISEASE (H): ICD-10-CM

## 2022-04-18 DIAGNOSIS — E78.1 HYPERTRIGLYCERIDEMIA: ICD-10-CM

## 2022-04-18 LAB
ALBUMIN SERPL-MCNC: 4 G/DL (ref 3.4–5)
ALP SERPL-CCNC: 103 U/L (ref 40–150)
ALT SERPL W P-5'-P-CCNC: 30 U/L (ref 0–70)
ANION GAP SERPL CALCULATED.3IONS-SCNC: 8 MMOL/L (ref 3–14)
AST SERPL W P-5'-P-CCNC: 18 U/L (ref 0–45)
BASOPHILS # BLD AUTO: 0.1 10E3/UL (ref 0–0.2)
BASOPHILS NFR BLD AUTO: 1 %
BILIRUB SERPL-MCNC: 0.5 MG/DL (ref 0.2–1.3)
BUN SERPL-MCNC: 13 MG/DL (ref 7–30)
CALCIUM SERPL-MCNC: 9.4 MG/DL (ref 8.5–10.1)
CHLORIDE BLD-SCNC: 108 MMOL/L (ref 94–109)
CHOLEST SERPL-MCNC: 137 MG/DL
CO2 SERPL-SCNC: 26 MMOL/L (ref 20–32)
CREAT SERPL-MCNC: 0.97 MG/DL (ref 0.66–1.25)
EOSINOPHIL # BLD AUTO: 0.1 10E3/UL (ref 0–0.7)
EOSINOPHIL NFR BLD AUTO: 1 %
ERYTHROCYTE [DISTWIDTH] IN BLOOD BY AUTOMATED COUNT: 13.8 % (ref 10–15)
FASTING STATUS PATIENT QL REPORTED: ABNORMAL
GFR SERPL CREATININE-BSD FRML MDRD: >90 ML/MIN/1.73M2
GLUCOSE BLD-MCNC: 105 MG/DL (ref 70–99)
HCT VFR BLD AUTO: 45.4 % (ref 40–53)
HDLC SERPL-MCNC: 23 MG/DL
HGB BLD-MCNC: 15.3 G/DL (ref 13.3–17.7)
IMM GRANULOCYTES # BLD: 0 10E3/UL
IMM GRANULOCYTES NFR BLD: 0 %
LDLC SERPL CALC-MCNC: 51 MG/DL
LYMPHOCYTES # BLD AUTO: 2.9 10E3/UL (ref 0.8–5.3)
LYMPHOCYTES NFR BLD AUTO: 32 %
MCH RBC QN AUTO: 29.4 PG (ref 26.5–33)
MCHC RBC AUTO-ENTMCNC: 33.7 G/DL (ref 31.5–36.5)
MCV RBC AUTO: 87 FL (ref 78–100)
MONOCYTES # BLD AUTO: 0.7 10E3/UL (ref 0–1.3)
MONOCYTES NFR BLD AUTO: 7 %
NEUTROPHILS # BLD AUTO: 5.4 10E3/UL (ref 1.6–8.3)
NEUTROPHILS NFR BLD AUTO: 59 %
NONHDLC SERPL-MCNC: 114 MG/DL
NRBC # BLD AUTO: 0 10E3/UL
NRBC BLD AUTO-RTO: 0 /100
PLATELET # BLD AUTO: 273 10E3/UL (ref 150–450)
POTASSIUM BLD-SCNC: 4 MMOL/L (ref 3.4–5.3)
PROT SERPL-MCNC: 8.2 G/DL (ref 6.8–8.8)
RBC # BLD AUTO: 5.21 10E6/UL (ref 4.4–5.9)
SODIUM SERPL-SCNC: 142 MMOL/L (ref 133–144)
TRIGL SERPL-MCNC: 317 MG/DL
WBC # BLD AUTO: 9.1 10E3/UL (ref 4–11)

## 2022-04-18 PROCEDURE — 85025 COMPLETE CBC W/AUTO DIFF WBC: CPT | Performed by: PATHOLOGY

## 2022-04-18 PROCEDURE — 80053 COMPREHEN METABOLIC PANEL: CPT | Performed by: PATHOLOGY

## 2022-04-18 PROCEDURE — 80061 LIPID PANEL: CPT | Performed by: PATHOLOGY

## 2022-04-18 PROCEDURE — 87536 HIV-1 QUANT&REVRSE TRNSCRPJ: CPT | Mod: 90 | Performed by: PATHOLOGY

## 2022-04-18 PROCEDURE — 99000 SPECIMEN HANDLING OFFICE-LAB: CPT | Performed by: PATHOLOGY

## 2022-04-18 PROCEDURE — 36415 COLL VENOUS BLD VENIPUNCTURE: CPT | Performed by: PATHOLOGY

## 2022-04-19 LAB — HIV1 RNA # PLAS NAA DL=20: NOT DETECTED COPIES/ML

## 2022-04-26 ENCOUNTER — OFFICE VISIT (OUTPATIENT)
Dept: INFECTIOUS DISEASES | Facility: CLINIC | Age: 52
End: 2022-04-26
Attending: INTERNAL MEDICINE
Payer: COMMERCIAL

## 2022-04-26 VITALS
OXYGEN SATURATION: 98 % | TEMPERATURE: 98 F | SYSTOLIC BLOOD PRESSURE: 124 MMHG | WEIGHT: 179 LBS | HEART RATE: 87 BPM | BODY MASS INDEX: 27.22 KG/M2 | DIASTOLIC BLOOD PRESSURE: 77 MMHG | RESPIRATION RATE: 16 BRPM

## 2022-04-26 DIAGNOSIS — B20 HUMAN IMMUNODEFICIENCY VIRUS (HIV) DISEASE (H): Primary | ICD-10-CM

## 2022-04-26 DIAGNOSIS — E78.1 HYPERTRIGLYCERIDEMIA: ICD-10-CM

## 2022-04-26 PROCEDURE — G0463 HOSPITAL OUTPT CLINIC VISIT: HCPCS

## 2022-04-26 PROCEDURE — 99214 OFFICE O/P EST MOD 30 MIN: CPT | Performed by: INTERNAL MEDICINE

## 2022-04-26 ASSESSMENT — PAIN SCALES - GENERAL: PAINLEVEL: NO PAIN (0)

## 2022-04-26 NOTE — NURSING NOTE
Chief Complaint   Patient presents with     Consult     Follow Up - 1 month     /77   Pulse 87   Temp 98  F (36.7  C)   Resp 16   Wt 81.2 kg (179 lb)   SpO2 98%   BMI 27.22 kg/m    Omid Aguirre on 4/26/2022 at 1:15 PM

## 2022-04-27 NOTE — PROGRESS NOTES
INFECTIOUS DISEASE CLINIC NOTE -   RETURN patient for HIV    SUBJECTIVE:    Sara Benjamin is a 50 y/o man originally from [facility].   He lives with well controlled HIV infection.  In 3/2022, we switched ART from Biktarvy > Dovato to move to a 2 drug regimen and reduce toxicity.  He reports no new side effects or issues, and likes the med.  Repeat labs 4/18 with HIV viral load not detected and no evidence of toxicity.    Triglycerides remain high, but better with fasting.  LDL is 51.       I have reviewed and updated the patient's Past Medical History, Social History, Family History and Medication List.    Past Medical History:    Sara reports first learning of his HIV diagnosis about 20 years ago in [facility] in about 2001.  Both he and his wife tested positive.  He did not start treatment right away, but was given medications eventually when his wife was pregnant for this first time. (The eldest child is 13 years old). He reports that he and his wife took the same medicine. Wife took medications during each pregnancy and the children were not infected with HIV.   He cannot confirm, but from his description it seems most likely that this was Atripla, or the equivalent as dispensed in [facility]. He also remembers taking another medicine for awhile, and then he could stop that one. Again, he cannot confirm, but this sounds like Bactrim.  He reports being treated for active TB once in [facility], and another time for a pneumonia.  He does not recall exact dates, and we do not have this documentation.  Other than that he does not recall any significant illnesses.  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    He came to the [facility] via [place], [place] first about 2013.  He has a brother in [place] and came over for work opportunities.      Sara was treated for latent TB while in [place].  The source documentation is scanned into EPIC as of 10/8/2019.    Clinic:  [facility]  Dates of treatment:  7/22/2015 through 11/18/2015  (4 months)  Medication:  rifampin 600mg po daily   4/9/2015  Quantiferon Gold  Positive  4/20/2015  Chest XR - no signs of active TB  Three sputums were negative for AFB growth, 4/29/2015, 5/3/2015, 5/5/2015    Patient presented to me 2/4/2019 on Descovy (tenofovir alafenamide/emtricitabine) and dolutegravir. He reported taking this regimen for the previous 1 year.  He was tolerating well and reported close adherence.  We switched to Biktarvy then to simplify the regimen to a single tablet daily.  We switched to Dovato 3/2022 to move to a 2 drug regimen and reduce toxicity     OBJECTIVE:    Current Outpatient Medications   Medication     Dolutegravir-lamiVUDine  MG TABS     loratadine (CLARITIN) 10 MG tablet     SENNA-docusate sodium (SENNA S) 8.6-50 MG tablet     mupirocin (BACTROBAN) 2 % external ointment        No Known Allergies     Physical Examination:  VITAL SIGNS: /77   Pulse 87   Temp 98  F (36.7  C)   Resp 16   Wt 81.2 kg (179 lb)   SpO2 98%   BMI 27.22 kg/m    GENERAL:   No acute distress    HEENT: No icterus or injection. Oropharynx moist and clear without lesions or exudate.    NECK: Supple and nontender  LYMPH:  No cervical or axillary lymphadenopathy  LUNGS: Clear to ausculation bilaterally without any increased work of breathing  HEART: Regular rate and rhythm and no murmur, gallop or rub    ABDOMEN: Normoactive bowel sounds, soft, nontender, nondistended, no hepatosplenomegaly.    EXTREMITIES: Warm and well perfused without clubbing, cyanosis, or edema  SKIN:  No rashes  NEURO:  Awake, alert, no focal neurologic deficits.  PSYCH: Affect normal. Speech seemingly fluent and appropriate. ( used for [employer])    Recent labs    Lab on 04/18/2022   Component Date Value Ref Range Status     Sodium 04/18/2022 142  133 -  144 mmol/L Final     Potassium 04/18/2022 4.0  3.4 - 5.3 mmol/L Final     Chloride 04/18/2022 108  94 - 109 mmol/L Final     Carbon Dioxide (CO2) 04/18/2022 26  20 - 32 mmol/L Final     Anion Gap 04/18/2022 8  3 - 14 mmol/L Final     Urea Nitrogen 04/18/2022 13  7 - 30 mg/dL Final     Creatinine 04/18/2022 0.97  0.66 - 1.25 mg/dL Final     Calcium 04/18/2022 9.4  8.5 - 10.1 mg/dL Final     Glucose 04/18/2022 105 (A) 70 - 99 mg/dL Final     Alkaline Phosphatase 04/18/2022 103  40 - 150 U/L Final     AST 04/18/2022 18  0 - 45 U/L Final     ALT 04/18/2022 30  0 - 70 U/L Final     Protein Total 04/18/2022 8.2  6.8 - 8.8 g/dL Final     Albumin 04/18/2022 4.0  3.4 - 5.0 g/dL Final     Bilirubin Total 04/18/2022 0.5  0.2 - 1.3 mg/dL Final     GFR Estimate 04/18/2022 >90  >60 mL/min/1.73m2 Final    Effective December 21, 2021 eGFRcr in adults is calculated using the 2021 CKD-EPI creatinine equation which includes age and gender (Abby et al., NEJ, DOI: 10.1056/BOEDpy7259529)     HIV-1 RNA copies/mL 04/18/2022 Not Detected  Not Detected Copies/mL Final     Cholesterol 04/18/2022 137  <200 mg/dL Final     Triglycerides 04/18/2022 317 (A) <150 mg/dL Final     Direct Measure HDL 04/18/2022 23 (A) >=40 mg/dL Final     LDL Cholesterol Calculated 04/18/2022 51  <=100 mg/dL Final     Non HDL Cholesterol 04/18/2022 114  <130 mg/dL Final     Patient Fasting > 8hrs? 04/18/2022 Unknown   Final     WBC Count 04/18/2022 9.1  4.0 - 11.0 10e3/uL Final     RBC Count 04/18/2022 5.21  4.40 - 5.90 10e6/uL Final     Hemoglobin 04/18/2022 15.3  13.3 - 17.7 g/dL Final     Hematocrit 04/18/2022 45.4  40.0 - 53.0 % Final     MCV 04/18/2022 87  78 - 100 fL Final     MCH 04/18/2022 29.4  26.5 - 33.0 pg Final     MCHC 04/18/2022 33.7  31.5 - 36.5 g/dL Final     RDW 04/18/2022 13.8  10.0 - 15.0 % Final     Platelet Count 04/18/2022 273  150 - 450 10e3/uL Final     % Neutrophils 04/18/2022 59  % Final     % Lymphocytes 04/18/2022 32  % Final     %  Monocytes 04/18/2022 7  % Final     % Eosinophils 04/18/2022 1  % Final     % Basophils 04/18/2022 1  % Final     % Immature Granulocytes 04/18/2022 0  % Final     NRBCs per 100 WBC 04/18/2022 0  <1 /100 Final     Absolute Neutrophils 04/18/2022 5.4  1.6 - 8.3 10e3/uL Final     Absolute Lymphocytes 04/18/2022 2.9  0.8 - 5.3 10e3/uL Final     Absolute Monocytes 04/18/2022 0.7  0.0 - 1.3 10e3/uL Final     Absolute Eosinophils 04/18/2022 0.1  0.0 - 0.7 10e3/uL Final     Absolute Basophils 04/18/2022 0.1  0.0 - 0.2 10e3/uL Final     Absolute Immature Granulocytes 04/18/2022 0.0  <=0.4 10e3/uL Final     Absolute NRBCs 04/18/2022 0.0  10e3/uL Final         ASSESSMENT and PLAN:    History of well controlled HIV infection    Doing well on Dovato after switch without concerns for side effects.  HIV viral load not detected.    Routine monitoring labs will be due next in 6 months.   We will include HgA1C as his fasting glucose was still a little bit high at 105.      Hypertriglyceridemia  Fasting lipids with triglyceride levels better at 317 and LDL just 51.   No intervention is indicated.  May improve more with switch to Dovato.      Intermittent abdominal pain with history of diverticulosis and constipation  Now over 50 years of age  - senna/docusate for PRN use  - screening colonoscopy referral placed      Post-nasal drip  Suspect environmental allergies.  We started some Claritin in 3/2022, but we did not talk about this today.  Will make sure to ask again next visit.       Bartolo Amor MD, MS  Infectious Disease    Time:  A total of 35 minutes was spent in care of the patient today. 25 minutes were spent in the room with the patient on history, examination, and counseling.  10 additional minutes on chart review, orders, and care coordination.

## 2022-05-21 ENCOUNTER — HEALTH MAINTENANCE LETTER (OUTPATIENT)
Age: 52
End: 2022-05-21

## 2022-08-09 DIAGNOSIS — R73.9 HYPERGLYCEMIA: ICD-10-CM

## 2022-08-09 DIAGNOSIS — B20 HUMAN IMMUNODEFICIENCY VIRUS (HIV) DISEASE (H): Primary | ICD-10-CM

## 2022-08-23 DIAGNOSIS — B20 HUMAN IMMUNODEFICIENCY VIRUS (HIV) DISEASE (H): ICD-10-CM

## 2022-09-18 ENCOUNTER — HEALTH MAINTENANCE LETTER (OUTPATIENT)
Age: 52
End: 2022-09-18

## 2022-10-10 ENCOUNTER — TELEPHONE (OUTPATIENT)
Dept: INFECTIOUS DISEASES | Facility: CLINIC | Age: 52
End: 2022-10-10

## 2022-10-11 ENCOUNTER — OFFICE VISIT (OUTPATIENT)
Dept: INFECTIOUS DISEASES | Facility: CLINIC | Age: 52
End: 2022-10-11
Attending: INTERNAL MEDICINE
Payer: COMMERCIAL

## 2022-10-11 VITALS
OXYGEN SATURATION: 97 % | SYSTOLIC BLOOD PRESSURE: 124 MMHG | HEIGHT: 68 IN | BODY MASS INDEX: 25.25 KG/M2 | RESPIRATION RATE: 18 BRPM | DIASTOLIC BLOOD PRESSURE: 73 MMHG | TEMPERATURE: 97.7 F | HEART RATE: 85 BPM | WEIGHT: 166.6 LBS

## 2022-10-11 DIAGNOSIS — B20 HUMAN IMMUNODEFICIENCY VIRUS (HIV) DISEASE (H): Primary | ICD-10-CM

## 2022-10-11 DIAGNOSIS — K59.04 CHRONIC IDIOPATHIC CONSTIPATION: ICD-10-CM

## 2022-10-11 DIAGNOSIS — Z91.09 ENVIRONMENTAL ALLERGIES: ICD-10-CM

## 2022-10-11 DIAGNOSIS — E78.1 HYPERTRIGLYCERIDEMIA: ICD-10-CM

## 2022-10-11 PROCEDURE — 99214 OFFICE O/P EST MOD 30 MIN: CPT | Performed by: INTERNAL MEDICINE

## 2022-10-11 PROCEDURE — G0463 HOSPITAL OUTPT CLINIC VISIT: HCPCS

## 2022-10-11 ASSESSMENT — PAIN SCALES - GENERAL: PAINLEVEL: NO PAIN (0)

## 2022-10-11 NOTE — NURSING NOTE
"Chief Complaint   Patient presents with     RECHECK     B20     Vital signs:  Temp: 97.7  F (36.5  C) Temp src: Oral BP: 124/73 Pulse: 85   Resp: 18 SpO2: 97 %     Height: 172.7 cm (5' 7.99\") Weight: 75.6 kg (166 lb 9.6 oz)  Estimated body mass index is 25.34 kg/m  as calculated from the following:    Height as of this encounter: 1.727 m (5' 7.99\").    Weight as of this encounter: 75.6 kg (166 lb 9.6 oz).        Haily Benz, [facility]  10/11/2022 4:56 PM      "

## 2022-10-11 NOTE — LETTER
Date:October 25, 2022      Patient was self referred, no letter generated. Do not send.        [facility] Health Information

## 2022-10-11 NOTE — LETTER
10/11/2022       RE: Sara Benjamin  [address]  [place] [address]     Dear Colleague,    Thank you for referring your patient, Sara Benjamin, to the [facility] INFECTIOUS DISEASE CLINIC [place] at [facility]. Please see a copy of my visit note below.      INFECTIOUS DISEASE CLINIC NOTE -   seen in person    RETURN patient for HIV    SUBJECTIVE:    Sara Benjamin is a 51 y/o man originally from [facility].   He lives with well controlled HIV infection.  In 3/2022, we switched ART from Biktarvy > Dovato to move to a 2 drug regimen and reduce toxicity.  He reports no new side effects or issues, and likes the tablet.  Last labs were from 4/18/22 with HIV viral load not detected and no evidence of toxicity.  Due for 6 month labs now.    Last visit we added Claritin for the post nasal drip symptoms he was having, and this helped a lot.  He is now using this as needed.   He is also using the senna/docusate as needed for constipation and likes having this as an option.    No new concerns.      I have reviewed and updated the patient's Past Medical History, Social History, Family History and Medication List.    Past Medical History:    Sara reports first learning of his HIV diagnosis about 20 years ago in [facility] in about 2001.  Both he and his wife tested positive.  He did not start treatment right away, but was given medications eventually when his wife was pregnant for this first time. (The eldest child is 13 years old). He reports that he and his wife took the same medicine. Wife took medications during each pregnancy and the children were not infected with HIV.   He cannot confirm, but from his description it seems most likely that this was Atripla, or the equivalent as dispensed in [facility]. He also remembers taking another medicine for awhile, and then he could stop that one. Again, he cannot confirm, but this sounds like Bactrim.  He reports being  "treated for active TB once in [place], and another time for a pneumonia.  He does not recall exact dates, and we do not have this documentation.  Other than that he does not recall any significant illnesses.  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    He came to the [facility] via [place] first about 2013.  He has a brother in [place] and came over for work opportunities.      Sara was treated for latent TB while in [place].  The source documentation is scanned into EPIC as of 10/8/2019.    Clinic:  [facility]  Dates of treatment:  7/22/2015 through 11/18/2015  (4 months)  Medication:  rifampin 600mg po daily   4/9/2015  Quantiferon Gold  Positive  4/20/2015  Chest XR - no signs of active TB  Three sputums were negative for AFB growth, 4/29/2015, 5/3/2015, 5/5/2015    Patient presented to me 2/4/2019 on Descovy (tenofovir alafenamide/emtricitabine) and dolutegravir. He reported taking this regimen for the previous 1 year.  He was tolerating well and reported close adherence.  We switched to Biktarvy then to simplify the regimen to a single tablet daily.  We switched to Dovato 3/2022 to move to a 2 drug regimen and reduce toxicity     OBJECTIVE:    Current Outpatient Medications   Medication     Dolutegravir-lamiVUDine  MG TABS     loratadine (CLARITIN) 10 MG tablet     SENNA-docusate sodium (SENNA S) 8.6-50 MG tablet     mupirocin (BACTROBAN) 2 % external ointment        No Known Allergies     Physical Examination:  VITAL SIGNS: /73 (BP Location: Right arm, Patient Position: Sitting, Cuff Size: Adult Regular)   Pulse 85   Temp 97.7  F (36.5  C) (Oral)   Resp 18   Ht 1.727 m (5' 7.99\")   Wt 75.6 kg (166 lb 9.6 oz)   SpO2 97%   BMI 25.34 kg/m    GENERAL:   No acute distress    HEENT: No icterus or injection. Oropharynx moist and clear without lesions or " exudate.    NECK: Supple and nontender  LYMPH:  No cervical or axillary lymphadenopathy  LUNGS: Clear to ausculation bilaterally without any increased work of breathing  HEART: Regular rate and rhythm and no murmur, gallop or rub    ABDOMEN: Normoactive bowel sounds, soft, nontender, nondistended, no hepatosplenomegaly.    EXTREMITIES: Warm and well perfused without clubbing, cyanosis, or edema  SKIN:  No rashes  NEURO:  Awake, alert, no focal neurologic deficits.  PSYCH: Affect normal. Speech seemingly fluent and appropriate. ( used for [employer])    Recent labs    Lab on 04/18/2022   Component Date Value Ref Range Status     Sodium 04/18/2022 142  133 - 144 mmol/L Final     Potassium 04/18/2022 4.0  3.4 - 5.3 mmol/L Final     Chloride 04/18/2022 108  94 - 109 mmol/L Final     Carbon Dioxide (CO2) 04/18/2022 26  20 - 32 mmol/L Final     Anion Gap 04/18/2022 8  3 - 14 mmol/L Final     Urea Nitrogen 04/18/2022 13  7 - 30 mg/dL Final     Creatinine 04/18/2022 0.97  0.66 - 1.25 mg/dL Final     Calcium 04/18/2022 9.4  8.5 - 10.1 mg/dL Final     Glucose 04/18/2022 105 (A) 70 - 99 mg/dL Final     Alkaline Phosphatase 04/18/2022 103  40 - 150 U/L Final     AST 04/18/2022 18  0 - 45 U/L Final     ALT 04/18/2022 30  0 - 70 U/L Final     Protein Total 04/18/2022 8.2  6.8 - 8.8 g/dL Final     Albumin 04/18/2022 4.0  3.4 - 5.0 g/dL Final     Bilirubin Total 04/18/2022 0.5  0.2 - 1.3 mg/dL Final     GFR Estimate 04/18/2022 >90  >60 mL/min/1.73m2 Final    Effective December 21, 2021 eGFRcr in adults is calculated using the 2021 CKD-EPI creatinine equation which includes age and gender (Abby thorpe al., NEJM, DOI: 10.1056/PZETcp8359762)     HIV-1 RNA copies/mL 04/18/2022 Not Detected  Not Detected Copies/mL Final     Cholesterol 04/18/2022 137  <200 mg/dL Final     Triglycerides 04/18/2022 317 (A) <150 mg/dL Final     Direct Measure HDL 04/18/2022 23 (A) >=40 mg/dL Final     LDL Cholesterol Calculated 04/18/2022 51  <=100  mg/dL Final     Non HDL Cholesterol 04/18/2022 114  <130 mg/dL Final     Patient Fasting > 8hrs? 04/18/2022 Unknown   Final     WBC Count 04/18/2022 9.1  4.0 - 11.0 10e3/uL Final     RBC Count 04/18/2022 5.21  4.40 - 5.90 10e6/uL Final     Hemoglobin 04/18/2022 15.3  13.3 - 17.7 g/dL Final     Hematocrit 04/18/2022 45.4  40.0 - 53.0 % Final     MCV 04/18/2022 87  78 - 100 fL Final     MCH 04/18/2022 29.4  26.5 - 33.0 pg Final     MCHC 04/18/2022 33.7  31.5 - 36.5 g/dL Final     RDW 04/18/2022 13.8  10.0 - 15.0 % Final     Platelet Count 04/18/2022 273  150 - 450 10e3/uL Final     % Neutrophils 04/18/2022 59  % Final     % Lymphocytes 04/18/2022 32  % Final     % Monocytes 04/18/2022 7  % Final     % Eosinophils 04/18/2022 1  % Final     % Basophils 04/18/2022 1  % Final     % Immature Granulocytes 04/18/2022 0  % Final     NRBCs per 100 WBC 04/18/2022 0  <1 /100 Final     Absolute Neutrophils 04/18/2022 5.4  1.6 - 8.3 10e3/uL Final     Absolute Lymphocytes 04/18/2022 2.9  0.8 - 5.3 10e3/uL Final     Absolute Monocytes 04/18/2022 0.7  0.0 - 1.3 10e3/uL Final     Absolute Eosinophils 04/18/2022 0.1  0.0 - 0.7 10e3/uL Final     Absolute Basophils 04/18/2022 0.1  0.0 - 0.2 10e3/uL Final     Absolute Immature Granulocytes 04/18/2022 0.0  <=0.4 10e3/uL Final     Absolute NRBCs 04/18/2022 0.0  10e3/uL Final         ASSESSMENT and PLAN:    History of well controlled HIV infection    Doing well on Dovato after switch without concerns for side effects.  HIV viral load not detected.    Due now for q6 month monitoring labs. I have put the orders in and Mogos will do them tomorrow morning so we can do the fasting lipids (as below).   We will also include HgA1C as his fasting glucose was still a little bit high at 105.      Hypertriglyceridemia    On last check we saw that fasting lipids with triglyceride levels better at 317 and LDL just 51.    No intervention was indicated. We will repeat fasting lipids now for tomorrow as  triglyceride levels may improve more with switch to Dovato.      Intermittent abdominal pain with history of diverticulosis and constipation  Now over 49 years of age  - senna/docusate for PRN use  - screening colonoscopy referral placed      Post-nasal drip  Suspect environmental allergies.  We started some Claritin in 3/2022 and he is seeing benefit from PRN use.        If labs result from tomorrow without concerns, Mogos can RTC with me in 6 months with routine labs once again.      Bartolo Amor MD, MS  Infectious Disease    [facility] billing  Level 4  More than 2 illnesses addressed  Ordered more than 3 tests  Prescription drug management        Again, thank you for allowing me to participate in the care of your patient.      Sincerely,    Bartolo Amor MD

## 2022-10-12 ENCOUNTER — LAB (OUTPATIENT)
Dept: LAB | Facility: CLINIC | Age: 52
End: 2022-10-12
Payer: COMMERCIAL

## 2022-10-12 DIAGNOSIS — R73.9 HYPERGLYCEMIA: ICD-10-CM

## 2022-10-12 DIAGNOSIS — B20 HUMAN IMMUNODEFICIENCY VIRUS (HIV) DISEASE (H): ICD-10-CM

## 2022-10-12 LAB
ALBUMIN SERPL BCG-MCNC: 4.1 G/DL (ref 3.5–5.2)
ALBUMIN UR-MCNC: NEGATIVE MG/DL
ALP SERPL-CCNC: 88 U/L (ref 40–129)
ALT SERPL W P-5'-P-CCNC: 14 U/L (ref 10–50)
ANION GAP SERPL CALCULATED.3IONS-SCNC: 9 MMOL/L (ref 7–15)
APPEARANCE UR: CLEAR
AST SERPL W P-5'-P-CCNC: 19 U/L (ref 10–50)
BASOPHILS # BLD AUTO: 0.1 10E3/UL (ref 0–0.2)
BASOPHILS NFR BLD AUTO: 1 %
BILIRUB SERPL-MCNC: 0.5 MG/DL
BILIRUB UR QL STRIP: NEGATIVE
BUN SERPL-MCNC: 13.4 MG/DL (ref 6–20)
CALCIUM SERPL-MCNC: 9.3 MG/DL (ref 8.6–10)
CD3 CELLS # BLD: 2763 CELLS/UL (ref 603–2990)
CD3 CELLS NFR BLD: 82 % (ref 49–84)
CD3+CD4+ CELLS # BLD: 1632 CELLS/UL (ref 441–2156)
CD3+CD4+ CELLS NFR BLD: 49 % (ref 28–63)
CD3+CD4+ CELLS/CD3+CD8+ CLL BLD: 1.61 % (ref 1.4–2.6)
CD3+CD8+ CELLS # BLD: 1017 CELLS/UL (ref 125–1312)
CD3+CD8+ CELLS NFR BLD: 30 % (ref 10–40)
CHLORIDE SERPL-SCNC: 106 MMOL/L (ref 98–107)
CHOLEST SERPL-MCNC: 143 MG/DL
COLOR UR AUTO: YELLOW
CREAT SERPL-MCNC: 0.91 MG/DL (ref 0.67–1.17)
CREAT UR-MCNC: 274 MG/DL
DEPRECATED HCO3 PLAS-SCNC: 22 MMOL/L (ref 22–29)
EOSINOPHIL # BLD AUTO: 0.1 10E3/UL (ref 0–0.7)
EOSINOPHIL NFR BLD AUTO: 1 %
ERYTHROCYTE [DISTWIDTH] IN BLOOD BY AUTOMATED COUNT: 13.2 % (ref 10–15)
GFR SERPL CREATININE-BSD FRML MDRD: >90 ML/MIN/1.73M2
GLUCOSE SERPL-MCNC: 102 MG/DL (ref 70–99)
GLUCOSE UR STRIP-MCNC: NEGATIVE MG/DL
HBA1C MFR BLD: 5.4 %
HCT VFR BLD AUTO: 42.4 % (ref 40–53)
HDLC SERPL-MCNC: 28 MG/DL
HGB BLD-MCNC: 14.8 G/DL (ref 13.3–17.7)
HGB UR QL STRIP: NEGATIVE
IMM GRANULOCYTES # BLD: 0 10E3/UL
IMM GRANULOCYTES NFR BLD: 0 %
KETONES UR STRIP-MCNC: NEGATIVE MG/DL
LDLC SERPL CALC-MCNC: 75 MG/DL
LEUKOCYTE ESTERASE UR QL STRIP: NEGATIVE
LYMPHOCYTES # BLD AUTO: 3.4 10E3/UL (ref 0.8–5.3)
LYMPHOCYTES NFR BLD AUTO: 34 %
MCH RBC QN AUTO: 30.6 PG (ref 26.5–33)
MCHC RBC AUTO-ENTMCNC: 34.9 G/DL (ref 31.5–36.5)
MCV RBC AUTO: 88 FL (ref 78–100)
MICROALBUMIN UR-MCNC: <12 MG/L
MICROALBUMIN/CREAT UR: NORMAL MG/G{CREAT}
MONOCYTES # BLD AUTO: 0.5 10E3/UL (ref 0–1.3)
MONOCYTES NFR BLD AUTO: 5 %
MUCOUS THREADS #/AREA URNS LPF: PRESENT /LPF
NEUTROPHILS # BLD AUTO: 5.8 10E3/UL (ref 1.6–8.3)
NEUTROPHILS NFR BLD AUTO: 59 %
NITRATE UR QL: NEGATIVE
NONHDLC SERPL-MCNC: 115 MG/DL
NRBC # BLD AUTO: 0 10E3/UL
NRBC BLD AUTO-RTO: 0 /100
PH UR STRIP: 6 [PH] (ref 5–7)
PLATELET # BLD AUTO: 263 10E3/UL (ref 150–450)
POTASSIUM SERPL-SCNC: 4.1 MMOL/L (ref 3.4–5.3)
PROT SERPL-MCNC: 6.9 G/DL (ref 6.4–8.3)
RBC # BLD AUTO: 4.84 10E6/UL (ref 4.4–5.9)
RBC URINE: 1 /HPF
SODIUM SERPL-SCNC: 137 MMOL/L (ref 136–145)
SP GR UR STRIP: 1.02 (ref 1–1.03)
SPERM #/AREA URNS HPF: PRESENT /HPF
T CELL COMMENT: NORMAL
TRIGL SERPL-MCNC: 202 MG/DL
UROBILINOGEN UR STRIP-MCNC: NORMAL MG/DL
WBC # BLD AUTO: 9.9 10E3/UL (ref 4–11)
WBC URINE: 1 /HPF

## 2022-10-12 PROCEDURE — 83036 HEMOGLOBIN GLYCOSYLATED A1C: CPT | Performed by: INTERNAL MEDICINE

## 2022-10-12 PROCEDURE — 86360 T CELL ABSOLUTE COUNT/RATIO: CPT | Performed by: INTERNAL MEDICINE

## 2022-10-12 PROCEDURE — 85025 COMPLETE CBC W/AUTO DIFF WBC: CPT | Performed by: PATHOLOGY

## 2022-10-12 PROCEDURE — 81001 URINALYSIS AUTO W/SCOPE: CPT | Performed by: PATHOLOGY

## 2022-10-12 PROCEDURE — 80053 COMPREHEN METABOLIC PANEL: CPT | Performed by: PATHOLOGY

## 2022-10-12 PROCEDURE — 80061 LIPID PANEL: CPT | Performed by: INTERNAL MEDICINE

## 2022-10-12 PROCEDURE — 36415 COLL VENOUS BLD VENIPUNCTURE: CPT | Performed by: PATHOLOGY

## 2022-10-12 PROCEDURE — 87536 HIV-1 QUANT&REVRSE TRNSCRPJ: CPT | Performed by: INTERNAL MEDICINE

## 2022-10-12 PROCEDURE — 82043 UR ALBUMIN QUANTITATIVE: CPT | Performed by: INTERNAL MEDICINE

## 2022-10-12 NOTE — PROGRESS NOTES
INFECTIOUS DISEASE CLINIC NOTE -   seen in person    RETURN patient for HIV    SUBJECTIVE:    Sara Benjamin is a 51 y/o man originally from [facility].   He lives with well controlled HIV infection.  In 3/2022, we switched ART from Biktarvy > Dovato to move to a 2 drug regimen and reduce toxicity.  He reports no new side effects or issues, and likes the tablet.  Last labs were from 4/18/22 with HIV viral load not detected and no evidence of toxicity.  Due for 6 month labs now.    Last visit we added Claritin for the post nasal drip symptoms he was having, and this helped a lot.  He is now using this as needed.   He is also using the senna/docusate as needed for constipation and likes having this as an option.    No new concerns.      I have reviewed and updated the patient's Past Medical History, Social History, Family History and Medication List.    Past Medical History:    Sara reports first learning of his HIV diagnosis about 20 years ago in [facility] in about 2001.  Both he and his wife tested positive.  He did not start treatment right away, but was given medications eventually when his wife was pregnant for this first time. (The eldest child is 13 years old). He reports that he and his wife took the same medicine. Wife took medications during each pregnancy and the children were not infected with HIV.   He cannot confirm, but from his description it seems most likely that this was Atripla, or the equivalent as dispensed in [facility]. He also remembers taking another medicine for awhile, and then he could stop that one. Again, he cannot confirm, but this sounds like Bactrim.  He reports being treated for active TB once in [facility], and another time for a pneumonia.  He does not recall exact dates, and we do not have this documentation.  Other than that he does not recall any significant illnesses.  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 by Dermatology.    He came to the [facility] via [place], [place] first about 2013.  He has a brother in [place] and came over for work opportunities.      Sara was treated for latent TB while in [place], [place].  The source documentation is scanned into EPIC as of 10/8/2019.    Clinic:  [facility]  Dates of treatment:  7/22/2015 through 11/18/2015  (4 months)  Medication:  rifampin 600mg po daily   4/9/2015  Quantiferon Gold  Positive  4/20/2015  Chest XR - no signs of active TB  Three sputums were negative for AFB growth, 4/29/2015, 5/3/2015, 5/5/2015    Patient presented to me 2/4/2019 on Descovy (tenofovir alafenamide/emtricitabine) and dolutegravir. He reported taking this regimen for the previous 1 year.  He was tolerating well and reported close adherence.  We switched to Biktarvy then to simplify the regimen to a single tablet daily.  We switched to Dovato 3/2022 to move to a 2 drug regimen and reduce toxicity     OBJECTIVE:    Current Outpatient Medications   Medication     Dolutegravir-lamiVUDine  MG TABS     loratadine (CLARITIN) 10 MG tablet     SENNA-docusate sodium (SENNA S) 8.6-50 MG tablet     mupirocin (BACTROBAN) 2 % external ointment        No Known Allergies     Physical Examination:  VITAL SIGNS: /73 (BP Location: Right arm, Patient Position: Sitting, Cuff Size: Adult Regular)   Pulse 85   Temp 97.7  F (36.5  C) (Oral)   Resp 18   Ht 1.727 m (5' 7.99\")   Wt 75.6 kg (166 lb 9.6 oz)   SpO2 97%   BMI 25.34 kg/m    GENERAL:   No acute distress    HEENT: No icterus or injection. Oropharynx moist and clear without lesions or exudate.    NECK: Supple and nontender  LYMPH:  No cervical or axillary lymphadenopathy  LUNGS: Clear to ausculation bilaterally without any increased work of breathing  HEART: Regular rate and rhythm and no murmur, gallop or rub    ABDOMEN: Normoactive bowel sounds, soft, nontender, nondistended, no hepatosplenomegaly.  "   EXTREMITIES: Warm and well perfused without clubbing, cyanosis, or edema  SKIN:  No rashes  NEURO:  Awake, alert, no focal neurologic deficits.  PSYCH: Affect normal. Speech seemingly fluent and appropriate. ( used for Riccardo)    Recent labs    Lab on 04/18/2022   Component Date Value Ref Range Status     Sodium 04/18/2022 142  133 - 144 mmol/L Final     Potassium 04/18/2022 4.0  3.4 - 5.3 mmol/L Final     Chloride 04/18/2022 108  94 - 109 mmol/L Final     Carbon Dioxide (CO2) 04/18/2022 26  20 - 32 mmol/L Final     Anion Gap 04/18/2022 8  3 - 14 mmol/L Final     Urea Nitrogen 04/18/2022 13  7 - 30 mg/dL Final     Creatinine 04/18/2022 0.97  0.66 - 1.25 mg/dL Final     Calcium 04/18/2022 9.4  8.5 - 10.1 mg/dL Final     Glucose 04/18/2022 105 (A) 70 - 99 mg/dL Final     Alkaline Phosphatase 04/18/2022 103  40 - 150 U/L Final     AST 04/18/2022 18  0 - 45 U/L Final     ALT 04/18/2022 30  0 - 70 U/L Final     Protein Total 04/18/2022 8.2  6.8 - 8.8 g/dL Final     Albumin 04/18/2022 4.0  3.4 - 5.0 g/dL Final     Bilirubin Total 04/18/2022 0.5  0.2 - 1.3 mg/dL Final     GFR Estimate 04/18/2022 >90  >60 mL/min/1.73m2 Final    Effective December 21, 2021 eGFRcr in adults is calculated using the 2021 CKD-EPI creatinine equation which includes age and gender (Abby et al., NEJM, DOI: 10.1056/VLSPbq0754340)     HIV-1 RNA copies/mL 04/18/2022 Not Detected  Not Detected Copies/mL Final     Cholesterol 04/18/2022 137  <200 mg/dL Final     Triglycerides 04/18/2022 317 (A) <150 mg/dL Final     Direct Measure HDL 04/18/2022 23 (A) >=40 mg/dL Final     LDL Cholesterol Calculated 04/18/2022 51  <=100 mg/dL Final     Non HDL Cholesterol 04/18/2022 114  <130 mg/dL Final     Patient Fasting > 8hrs? 04/18/2022 Unknown   Final     WBC Count 04/18/2022 9.1  4.0 - 11.0 10e3/uL Final     RBC Count 04/18/2022 5.21  4.40 - 5.90 10e6/uL Final     Hemoglobin 04/18/2022 15.3  13.3 - 17.7 g/dL Final     Hematocrit 04/18/2022 45.4   40.0 - 53.0 % Final     MCV 04/18/2022 87  78 - 100 fL Final     MCH 04/18/2022 29.4  26.5 - 33.0 pg Final     MCHC 04/18/2022 33.7  31.5 - 36.5 g/dL Final     RDW 04/18/2022 13.8  10.0 - 15.0 % Final     Platelet Count 04/18/2022 273  150 - 450 10e3/uL Final     % Neutrophils 04/18/2022 59  % Final     % Lymphocytes 04/18/2022 32  % Final     % Monocytes 04/18/2022 7  % Final     % Eosinophils 04/18/2022 1  % Final     % Basophils 04/18/2022 1  % Final     % Immature Granulocytes 04/18/2022 0  % Final     NRBCs per 100 WBC 04/18/2022 0  <1 /100 Final     Absolute Neutrophils 04/18/2022 5.4  1.6 - 8.3 10e3/uL Final     Absolute Lymphocytes 04/18/2022 2.9  0.8 - 5.3 10e3/uL Final     Absolute Monocytes 04/18/2022 0.7  0.0 - 1.3 10e3/uL Final     Absolute Eosinophils 04/18/2022 0.1  0.0 - 0.7 10e3/uL Final     Absolute Basophils 04/18/2022 0.1  0.0 - 0.2 10e3/uL Final     Absolute Immature Granulocytes 04/18/2022 0.0  <=0.4 10e3/uL Final     Absolute NRBCs 04/18/2022 0.0  10e3/uL Final         ASSESSMENT and PLAN:    History of well controlled HIV infection    Doing well on Dovato after switch without concerns for side effects.  HIV viral load not detected.    Due now for q6 month monitoring labs. I have put the orders in and Chonos will do them tomorrow morning so we can do the fasting lipids (as below).   We will also include HgA1C as his fasting glucose was still a little bit high at 105.      Hypertriglyceridemia    On last check we saw that fasting lipids with triglyceride levels better at 317 and LDL just 51.    No intervention was indicated. We will repeat fasting lipids now for tomorrow as triglyceride levels may improve more with switch to Dovato.      Intermittent abdominal pain with history of diverticulosis and constipation  Now over 50 years of age  - senna/docusate for PRN use  - screening colonoscopy referral placed      Post-nasal drip  Suspect environmental allergies.  We started some Claritin in  3/2022 and he is seeing benefit from PRN use.        If labs result from tomorrow without concerns, Mogos can RTC with me in 6 months with routine labs once again.      Bartolo Amor MD, MS  Infectious Disease    MDM billing  Level 4  More than 2 illnesses addressed  Ordered more than 3 tests  Prescription drug management

## 2022-10-14 LAB — HIV1 RNA # PLAS NAA DL=20: NOT DETECTED COPIES/ML

## 2022-11-11 ENCOUNTER — TELEPHONE (OUTPATIENT)
Dept: INFECTIOUS DISEASES | Facility: CLINIC | Age: 52
End: 2022-11-11

## 2022-11-15 ENCOUNTER — ALLIED HEALTH/NURSE VISIT (OUTPATIENT)
Dept: INFECTIOUS DISEASES | Facility: CLINIC | Age: 52
End: 2022-11-15
Attending: INTERNAL MEDICINE
Payer: COMMERCIAL

## 2022-11-15 DIAGNOSIS — B20 HUMAN IMMUNODEFICIENCY VIRUS (HIV) DISEASE (H): ICD-10-CM

## 2022-11-15 DIAGNOSIS — Z23 NEED FOR INFLUENZA VACCINATION: ICD-10-CM

## 2022-11-15 DIAGNOSIS — Z23 NEED FOR COVID-19 VACCINE: Primary | ICD-10-CM

## 2022-11-15 PROCEDURE — G0008 ADMIN INFLUENZA VIRUS VAC: HCPCS | Performed by: INTERNAL MEDICINE

## 2022-11-15 PROCEDURE — 90682 RIV4 VACC RECOMBINANT DNA IM: CPT | Performed by: INTERNAL MEDICINE

## 2022-11-15 PROCEDURE — 91312 HC RX IP 250 OP 636: CPT | Performed by: INTERNAL MEDICINE

## 2022-11-15 PROCEDURE — 0124A HC ADMIN COVID VAC PFIZER 12+ BIVAL ADDITIONAL: CPT | Performed by: INTERNAL MEDICINE

## 2022-11-15 PROCEDURE — 250N000011 HC RX IP 250 OP 636: Performed by: INTERNAL MEDICINE

## 2022-11-15 RX ADMIN — INFLUENZA A VIRUS A/WISCONSIN/588/2019 (H1N1) RECOMBINANT HEMAGGLUTININ ANTIGEN, INFLUENZA A VIRUS A/DARWIN/6/2021 (H3N2) RECOMBINANT HEMAGGLUTININ ANTIGEN, INFLUENZA B VIRUS B/AUSTRIA/1359417/2021 RECOMBINANT HEMAGGLUTININ ANTIGEN, AND INFLUENZA B VIRUS B/PHUKET/3073/2013 RECOMBINANT HEMAGGLUTININ ANTIGEN 0.5 ML: 45; 45; 45; 45 INJECTION INTRAMUSCULAR at 16:37

## 2022-11-15 RX ADMIN — BNT162B2 ORIGINAL AND OMICRON BA.4/BA.5 30 MCG: .1125; .1125 INJECTION, SUSPENSION INTRAMUSCULAR at 16:36

## 2022-11-15 NOTE — NURSING NOTE
Flublok and Pfizer Bivalent COVID-19 vaccine given today. See MAR for details.    Odin Corona, EMT on 11/15/2022 at 4:38 PM

## 2023-03-07 ENCOUNTER — TELEPHONE (OUTPATIENT)
Dept: INFECTIOUS DISEASES | Facility: CLINIC | Age: 53
End: 2023-03-07
Payer: COMMERCIAL

## 2023-03-07 NOTE — TELEPHONE ENCOUNTER
EP + interp LVM 3/7 to let pt know of cancellations on 4/5 (lab) and 4/11 (with Dr. Amor) appts; provider not avail.

## 2023-04-11 NOTE — PROGRESS NOTES
Infectious Disease Clinic Note    Mr. Sara Benjamin returns today for short interval follow-up of his HIV, to review immunization history.    He requested this appointment because he needed a physician to complete an immigration form that required a history and physical, along with immunization history.  Unfortunately, Sara did not think to bring this form with him today.  I attempted to call the center that is working with him on his immigration status, but they were closed for the day at the time of our appointment.   Sara decided that he will come back soon, and bring his form with him the next time.    At our last appointment, Sara signed a BIJU and so I was able to obtain his medial records from the [facility] in [place], [place] where he last received HIV care.   The following is his update vaccination history.  We administered his 2nd Menveo today, and ordered some titers with the goal of having as complete information as possible.    PLAN:    - Return in 1-2 weeks for a short visit to fill out the immigration form.   Sara will need to see one of my colleagues as I will be out on leave for the month of May.       Here is the information for his immunization history, now updated.    Immunization History   Administered Date(s) Administered     HepB-Adult 01/29/2015, 04/09/2015, 07/23/2015, 12/07/2016     HepB-Dialysis 05/12/2016, 07/14/2016     Influenza Vaccine, 3 YRS +, IM (QUADRIVALENT W/PRESERVATIVES) 12/06/2017     Meningococcal (Menveo ) 03/20/2018, 04/29/2019     Pneumo Conj 13-V (2010&after) 04/09/2015     Pneumococcal 23 valent 01/29/2015     Tdap (Adult) Unspecified Formulation 12/30/2014 12/11/2018 Hepatitis B Surface Antibody nonreactive  [place] [place] 2 rounds of immunization as above[place]       4/12/2019 Hepatitis A IgG positive  4/29/2019 Measles IgG positive  4/29/2019 Mumps IgG positive  4/29/2019 Rubella IgG positive    4/12/2019  Quantiferon TB Gold Plus positive   He has a fever, sore throat and had abdominal pain that started today,       (completed treatment for latent TB in [place], [place] in [id number])      Please have Mogos return to see me in July for routine HIV care.      Bartolo Amor MD, MS  Infectious Disease    Time:  I spent 20 minutes in direct care with this patient, including >50% of time face-to-face with the patient counseling about the plan of care and discussing his vaccine history.

## 2023-04-28 DIAGNOSIS — B20 HUMAN IMMUNODEFICIENCY VIRUS (HIV) DISEASE (H): Primary | ICD-10-CM

## 2023-05-01 ENCOUNTER — TELEPHONE (OUTPATIENT)
Dept: INFECTIOUS DISEASES | Facility: CLINIC | Age: 53
End: 2023-05-01
Payer: COMMERCIAL

## 2023-05-01 NOTE — TELEPHONE ENCOUNTER
EP + interp LVM 5/1 to let pt and wife know about 5/9 lab appts at  and 5/16 appts with Dr. Amor and to call ID if day and time doesn't work.       ----- Message from Bartolo Amor MD sent at 5/1/2023  1:12 PM CDT -----  Regarding: scheduling for this  couple    Sara and his wife Jyoti Pierce MRN:  [id number]    We had to cancel their appts a few weeks ago when I was out of clinic, but they were not rescheduled.    I see that Jyoti has an appt with primary care coming up 5/9.   Sara often accompanies her to appointments.    Please call them using a [employer] .     Offer Sara and Jyoti a lab appt at [facility] on 5/9.  They both can get labs done together that day when Jyoti is there for her appt.    Then please also make an appt with me in person for both of them. They each need 30 mins and can be scheduled back-to-back, roomed together.  Follow-up B20.      May 16th we could do 12:30pm add on and then 1:00pm AMADA spot to achieve this ASAP.    Thanks!  -Bartolo

## 2023-05-17 ENCOUNTER — TELEPHONE (OUTPATIENT)
Dept: INFECTIOUS DISEASES | Facility: CLINIC | Age: 53
End: 2023-05-17
Payer: COMMERCIAL

## 2023-05-17 NOTE — TELEPHONE ENCOUNTER
EP + interp called 5/17 to let pt know about 5/24 lab appt and 5/30 follow up with Dr. Amor. Patient was okay with days and times.       ----- Message from Bartolo Amor MD sent at 5/16/2023  5:03 PM CDT -----  Regarding: needs labs and visit      Mehrdad  is needed.    Sara missed his appt with me today.  His wife came for her visit with me, but she said he did not know that he also had a visit.    Please call him to set up labs (first) and visit with me about 1 week later.   This needs to be ASAP as he is overdue.    Thank you ,    -Bartolo

## 2023-05-24 ENCOUNTER — LAB (OUTPATIENT)
Dept: LAB | Facility: CLINIC | Age: 53
End: 2023-05-24
Payer: COMMERCIAL

## 2023-05-24 DIAGNOSIS — B20 HUMAN IMMUNODEFICIENCY VIRUS (HIV) DISEASE (H): ICD-10-CM

## 2023-05-24 LAB
ALBUMIN SERPL BCG-MCNC: 4.3 G/DL (ref 3.5–5.2)
ALP SERPL-CCNC: 74 U/L (ref 40–129)
ALT SERPL W P-5'-P-CCNC: 17 U/L (ref 10–50)
ANION GAP SERPL CALCULATED.3IONS-SCNC: 9 MMOL/L (ref 7–15)
AST SERPL W P-5'-P-CCNC: 19 U/L (ref 10–50)
BASOPHILS # BLD AUTO: 0.1 10E3/UL (ref 0–0.2)
BASOPHILS NFR BLD AUTO: 1 %
BILIRUB SERPL-MCNC: 0.3 MG/DL
BUN SERPL-MCNC: 13.7 MG/DL (ref 6–20)
CALCIUM SERPL-MCNC: 9.6 MG/DL (ref 8.6–10)
CHLORIDE SERPL-SCNC: 108 MMOL/L (ref 98–107)
CREAT SERPL-MCNC: 0.84 MG/DL (ref 0.67–1.17)
DEPRECATED HCO3 PLAS-SCNC: 22 MMOL/L (ref 22–29)
EOSINOPHIL # BLD AUTO: 0.2 10E3/UL (ref 0–0.7)
EOSINOPHIL NFR BLD AUTO: 2 %
ERYTHROCYTE [DISTWIDTH] IN BLOOD BY AUTOMATED COUNT: 13.2 % (ref 10–15)
GFR SERPL CREATININE-BSD FRML MDRD: >90 ML/MIN/1.73M2
GLUCOSE SERPL-MCNC: 118 MG/DL (ref 70–99)
HCT VFR BLD AUTO: 43 % (ref 40–53)
HGB BLD-MCNC: 15.5 G/DL (ref 13.3–17.7)
IMM GRANULOCYTES # BLD: 0 10E3/UL
IMM GRANULOCYTES NFR BLD: 0 %
LYMPHOCYTES # BLD AUTO: 3.3 10E3/UL (ref 0.8–5.3)
LYMPHOCYTES NFR BLD AUTO: 37 %
MCH RBC QN AUTO: 31.4 PG (ref 26.5–33)
MCHC RBC AUTO-ENTMCNC: 36 G/DL (ref 31.5–36.5)
MCV RBC AUTO: 87 FL (ref 78–100)
MONOCYTES # BLD AUTO: 0.6 10E3/UL (ref 0–1.3)
MONOCYTES NFR BLD AUTO: 6 %
NEUTROPHILS # BLD AUTO: 4.7 10E3/UL (ref 1.6–8.3)
NEUTROPHILS NFR BLD AUTO: 54 %
NRBC # BLD AUTO: 0 10E3/UL
NRBC BLD AUTO-RTO: 0 /100
PLATELET # BLD AUTO: 264 10E3/UL (ref 150–450)
POTASSIUM SERPL-SCNC: 4.2 MMOL/L (ref 3.4–5.3)
PROT SERPL-MCNC: 7.3 G/DL (ref 6.4–8.3)
RBC # BLD AUTO: 4.93 10E6/UL (ref 4.4–5.9)
SODIUM SERPL-SCNC: 139 MMOL/L (ref 136–145)
WBC # BLD AUTO: 8.8 10E3/UL (ref 4–11)

## 2023-05-24 PROCEDURE — 85025 COMPLETE CBC W/AUTO DIFF WBC: CPT | Performed by: PATHOLOGY

## 2023-05-24 PROCEDURE — 80053 COMPREHEN METABOLIC PANEL: CPT | Performed by: PATHOLOGY

## 2023-05-24 PROCEDURE — 86360 T CELL ABSOLUTE COUNT/RATIO: CPT | Mod: 90 | Performed by: PATHOLOGY

## 2023-05-24 PROCEDURE — 86359 T CELLS TOTAL COUNT: CPT | Mod: 90 | Performed by: PATHOLOGY

## 2023-05-24 PROCEDURE — 99000 SPECIMEN HANDLING OFFICE-LAB: CPT | Performed by: PATHOLOGY

## 2023-05-24 PROCEDURE — 36415 COLL VENOUS BLD VENIPUNCTURE: CPT | Performed by: PATHOLOGY

## 2023-05-24 PROCEDURE — 87536 HIV-1 QUANT&REVRSE TRNSCRPJ: CPT | Mod: 90 | Performed by: PATHOLOGY

## 2023-05-25 LAB
CD3 CELLS # BLD: 2567 CELLS/UL (ref 603–2990)
CD3 CELLS NFR BLD: 80 % (ref 49–84)
CD3+CD4+ CELLS # BLD: 1465 CELLS/UL (ref 441–2156)
CD3+CD4+ CELLS NFR BLD: 46 % (ref 28–63)
CD3+CD4+ CELLS/CD3+CD8+ CLL BLD: 1.43 % (ref 1.4–2.6)
CD3+CD8+ CELLS # BLD: 1024 CELLS/UL (ref 125–1312)
CD3+CD8+ CELLS NFR BLD: 32 % (ref 10–40)
HIV1 RNA # PLAS NAA DL=20: NOT DETECTED COPIES/ML
T CELL COMMENT: NORMAL

## 2023-06-04 ENCOUNTER — HEALTH MAINTENANCE LETTER (OUTPATIENT)
Age: 53
End: 2023-06-04

## 2023-06-27 ENCOUNTER — OFFICE VISIT (OUTPATIENT)
Dept: INFECTIOUS DISEASES | Facility: CLINIC | Age: 53
End: 2023-06-27
Attending: INTERNAL MEDICINE
Payer: COMMERCIAL

## 2023-06-27 VITALS
SYSTOLIC BLOOD PRESSURE: 122 MMHG | DIASTOLIC BLOOD PRESSURE: 73 MMHG | OXYGEN SATURATION: 97 % | BODY MASS INDEX: 24.03 KG/M2 | HEART RATE: 74 BPM | TEMPERATURE: 98 F | WEIGHT: 158 LBS

## 2023-06-27 DIAGNOSIS — B20 HUMAN IMMUNODEFICIENCY VIRUS (HIV) DISEASE (H): ICD-10-CM

## 2023-06-27 DIAGNOSIS — B20 HUMAN IMMUNODEFICIENCY VIRUS (HIV) DISEASE (H): Primary | ICD-10-CM

## 2023-06-27 DIAGNOSIS — Z12.11 SCREENING FOR COLON CANCER: ICD-10-CM

## 2023-06-27 PROCEDURE — 99214 OFFICE O/P EST MOD 30 MIN: CPT | Performed by: INTERNAL MEDICINE

## 2023-06-27 PROCEDURE — G0463 HOSPITAL OUTPT CLINIC VISIT: HCPCS | Performed by: INTERNAL MEDICINE

## 2023-06-27 ASSESSMENT — PAIN SCALES - GENERAL: PAINLEVEL: NO PAIN (0)

## 2023-06-27 NOTE — Clinical Note
6/27/2023       RE: Sara Benjamin  [address]  [place] [address]     Dear Colleague,    Thank you for referring your patient, Sara Benjamin, to the [facility] INFECTI[facility] DISEASE CLINIC [facility]. Please see a copy of my visit note below.    No notes on file    Again, thank you for allowing me to participate in the care of your patient.      Sincerely,    Bartolo Amor MD

## 2023-06-27 NOTE — PROGRESS NOTES
Infectious Disease Clinic Visit - seen in person      SUBJECTIVE and Interval History:    Sara Benjamin is a 53 y/o man originally from [facility].   He lives with well controlled HIV infection.    In 3/2022 (now more than 1 year ago) we switched ART from Biktarvy > Dovato to move to a 2 drug regimen and to reduce toxicity risk.  He reports no new side effects or issues, and he likes the tablet.  Last labs were from 5/24/23 with HIV viral load not detected and no evidence of toxicity.  Absolute CD4 robust at 1465 cells/uL    He continues to use Claritin PRN for the post nasal drip symptoms he was having, and this has helped a lot.  We again reinforced need for senna/docusate as needed for constipation.  Patient is welcoming of colon cancer screening now and asks for me to order for him again.    Otherwise, no new concerns.      I have reviewed and updated the patient's Past Medical History, Social History, Family History and Medication List.    Past Medical History:    Sara reports first learning of his HIV diagnosis about 20 years ago in [facility] in about 2001.  Both he and his wife tested positive.  He did not start treatment right away, but was given medications eventually when his wife was pregnant for this first time. (The eldest child is 13 years old). He reports that he and his wife took the same medicine. Wife took medications during each pregnancy and the children were not infected with HIV.   He cannot confirm, but from his description it seems most likely that this was Atripla, or the equivalent as dispensed in [facility]. He also remembers taking another medicine for awhile, and then he could stop that one. Again, he cannot confirm, but this sounds like Bactrim.  He reports being treated for active TB once in [place], and another time for a pneumonia.  He does not recall exact dates, and we do not have this documentation.  Other than that he does not recall any significant illnesses.  He has  longstanding HPV-related condyloma acuminata skin lesions in his groin and genitals that were seen here at [facility] by Dermatology.    He came to the [facility] via [place] first about 2013.  He has a brother in [place] and came over for work opportunities.      Sara was treated for latent TB while in [place].  The source documentation is scanned into EPIC as of 10/8/2019.    Clinic:  [facility]  Dates of treatment:  7/22/2015 through 11/18/2015  (4 months)  Medication:  rifampin 600mg po daily   4/9/2015  Quantiferon Gold  Positive  4/20/2015  Chest XR - no signs of active TB  Three sputums were negative for AFB growth, 4/29/2015, 5/3/2015, 5/5/2015    Patient presented to me 2/4/2019 on Descovy (tenofovir alafenamide/emtricitabine) and dolutegravir. He reported taking this regimen for the previous 1 year.  He was tolerating well and reported close adherence.  We switched to Biktarvy then to simplify the regimen to a single tablet daily.  Next we switched to Dovato 3/2022 to move to a 2 drug regimen and reduce toxicity risk.     OBJECTIVE:    Current Outpatient Medications   Medication     Dolutegravir-lamiVUDine  MG TABS     loratadine (CLARITIN) 10 MG tablet     SENNA-docusate sodium (SENNA S) 8.6-50 MG tablet     mupirocin (BACTROBAN) 2 % external ointment        No Known Allergies     Physical Examination:  VITAL SIGNS: /73 (BP Location: Right arm, Patient Position: Sitting, Cuff Size: Adult Regular)   Pulse 74   Temp 98  F (36.7  C) (Oral)   Wt 71.7 kg (158 lb)   SpO2 97%   BMI 24.03 kg/m    GENERAL:   No acute distress    HEENT: No icterus or injection. Oropharynx moist and clear without lesions or exudate.    NECK: Supple and nontender  LYMPH:  No cervical or axillary lymphadenopathy  LUNGS: Clear to ausculation bilaterally without any increased work of breathing  HEART: Regular rate and rhythm and no murmur, gallop or rub    ABDOMEN: Normoactive  bowel sounds, soft, nontender, nondistended, no hepatosplenomegaly.    EXTREMITIES: Warm and well perfused without clubbing, cyanosis, or edema  SKIN:  No rashes  NEURO:  Awake, alert, no focal neurologic deficits.  PSYCH: Affect normal. Speech seemingly fluent and appropriate. ( used for Riccardo)    Recent labs    Lab on 05/24/2023   Component Date Value Ref Range Status     Sodium 05/24/2023 139  136 - 145 mmol/L Final     Potassium 05/24/2023 4.2  3.4 - 5.3 mmol/L Final     Chloride 05/24/2023 108 (H)  98 - 107 mmol/L Final     Carbon Dioxide (CO2) 05/24/2023 22  22 - 29 mmol/L Final     Anion Gap 05/24/2023 9  7 - 15 mmol/L Final     Urea Nitrogen 05/24/2023 13.7  6.0 - 20.0 mg/dL Final     Creatinine 05/24/2023 0.84  0.67 - 1.17 mg/dL Final     Calcium 05/24/2023 9.6  8.6 - 10.0 mg/dL Final     Glucose 05/24/2023 118 (H)  70 - 99 mg/dL Final     Alkaline Phosphatase 05/24/2023 74  40 - 129 U/L Final     AST 05/24/2023 19  10 - 50 U/L Final     ALT 05/24/2023 17  10 - 50 U/L Final     Protein Total 05/24/2023 7.3  6.4 - 8.3 g/dL Final     Albumin 05/24/2023 4.3  3.5 - 5.2 g/dL Final     Bilirubin Total 05/24/2023 0.3  <=1.2 mg/dL Final     GFR Estimate 05/24/2023 >90  >60 mL/min/1.73m2 Final    eGFR calculated using 2021 CKD-EPI equation.     HIV-1 RNA copies/mL 05/24/2023 Not Detected  Not Detected Copies/mL Final     CD3% Total T Cells 05/24/2023 80  49 - 84 % Final     Absolute CD3, Total T Cells 05/24/2023 2,567  603 - 2,990 cells/uL Final     CD4% Julian T Cells 05/24/2023 46  28 - 63 % Final     Absolute CD4, Julian T Cells 05/24/2023 1,465  441 - 2,156 cells/uL Final     CD8% Suppressor T Cells 05/24/2023 32  10 - 40 % Final     Absolute CD8, Suppressor T Cells 05/24/2023 1,024  125 - 1,312 cells/uL Final     CD4:CD8 Ratio 05/24/2023 1.43  1.40 - 2.60 Final     WBC Count 05/24/2023 8.8  4.0 - 11.0 10e3/uL Final     RBC Count 05/24/2023 4.93  4.40 - 5.90 10e6/uL Final     Hemoglobin 05/24/2023  15.5  13.3 - 17.7 g/dL Final     Hematocrit 05/24/2023 43.0  40.0 - 53.0 % Final     MCV 05/24/2023 87  78 - 100 fL Final     MCH 05/24/2023 31.4  26.5 - 33.0 pg Final     MCHC 05/24/2023 36.0  31.5 - 36.5 g/dL Final     RDW 05/24/2023 13.2  10.0 - 15.0 % Final     Platelet Count 05/24/2023 264  150 - 450 10e3/uL Final     % Neutrophils 05/24/2023 54  % Final     % Lymphocytes 05/24/2023 37  % Final     % Monocytes 05/24/2023 6  % Final     % Eosinophils 05/24/2023 2  % Final     % Basophils 05/24/2023 1  % Final     % Immature Granulocytes 05/24/2023 0  % Final     NRBCs per 100 WBC 05/24/2023 0  <1 /100 Final     Absolute Neutrophils 05/24/2023 4.7  1.6 - 8.3 10e3/uL Final     Absolute Lymphocytes 05/24/2023 3.3  0.8 - 5.3 10e3/uL Final     Absolute Monocytes 05/24/2023 0.6  0.0 - 1.3 10e3/uL Final     Absolute Eosinophils 05/24/2023 0.2  0.0 - 0.7 10e3/uL Final     Absolute Basophils 05/24/2023 0.1  0.0 - 0.2 10e3/uL Final     Absolute Immature Granulocytes 05/24/2023 0.0  <=0.4 10e3/uL Final     Absolute NRBCs 05/24/2023 0.0  10e3/uL Final           ASSESSMENT and PLAN:    History of well controlled HIV infection    Doing well on Dovato without concerns for side effects.  HIV viral load not detected.    Recommend to continue with q6 month monitoring labs.      Hypertriglyceridemia    Last checked 10/12/2022 and triglycerides were 202  (downtrending).   Improved with switch to Dovato and ensuring that we had fasting labs.  Plan to just monitor annually and not intervene if stable.  HDL a bit low at 28.  LDL normal range at 75.        Intermittent abdominal pain with history of diverticulosis and constipation  Now over 50 years of age  - senna/docusate for PRN use  - screening colonoscopy referral placed once again, patient now more motivated to pursue this      Post-nasal drip  Suspect environmental allergies.  We started some Claritin in 3/2022 and he is seeing benefit from PRN use.        RTC in 6 months with  routine labs the week prior.   Will transfer care to another HIV provider in the practice as I am transitioning to a [place] of state.  Patient and his wife prefer to have the same HIV provider.  I have had them both scheduled in 10/2023.  They have separate appt times but liked to be roomed together.      Bartolo Amor MD, MS  Infectious Disease    MDM billing  Level 4  More than 2 illnesses addressed  Ordered more than 3 tests  Prescription drug management

## 2023-06-27 NOTE — NURSING NOTE
Chief Complaint   Patient presents with     RECHECK     /73 (BP Location: Right arm, Patient Position: Sitting, Cuff Size: Adult Regular)   Pulse 74   Temp 98  F (36.7  C) (Oral)   Wt 71.7 kg (158 lb)   SpO2 97%   BMI 24.03 kg/m

## 2023-08-11 ENCOUNTER — TELEPHONE (OUTPATIENT)
Dept: GASTROENTEROLOGY | Facility: CLINIC | Age: 53
End: 2023-08-11
Payer: COMMERCIAL

## 2023-08-11 NOTE — TELEPHONE ENCOUNTER
"Endoscopy Scheduling Screen    Have you had a positive Covid test in the last 14 days?  No    Are you active on MyChart?   No    What insurance is in the chart?  Other:  [facility]    Ordering/Referring Provider: OPHELIA JULIEN   (If ordering provider performs procedure, schedule with ordering provider unless otherwise instructed. )    BMI: Estimated body mass index is 24.03 kg/m  as calculated from the following:    Height as of 10/11/22: 1.727 m (5' 7.99\").    Weight as of 6/27/23: 71.7 kg (158 lb).     Sedation Ordered  moderate sedation.   If patient BMI > 50 do not schedule in ASC.    Are you taking any prescription medications for pain?   No    Are you taking methadone or Suboxone?  No    Do you have a history of malignant hyperthermia or adverse reaction to anesthesia?  No    (Females) Are you currently pregnant?   No     Have you been diagnosed or told you have pulmonary hypertension?   No    Do you have an LVAD?  No    Have you been told you have moderate to severe sleep apnea?  No    Have you been told you have COPD, asthma, or any other lung disease?  No    Do you have any heart conditions?  No     Have you ever had or are you awaiting a heart or lung transplant?   No    Have you had a stroke or transient ischemic attack (TIA aka \"mini stroke\" in the last 6 months?   No    Have you been diagnosed with or been told you have cirrhosis of the liver?   No    Are you currently on dialysis?   No    Do you need assistance transferring?   No    BMI: Estimated body mass index is 24.03 kg/m  as calculated from the following:    Height as of 10/11/22: 1.727 m (5' 7.99\").    Weight as of 6/27/23: 71.7 kg (158 lb).     Is patients BMI > 40 and scheduling location [facility]?  No    Do you take the medication Phentermine, Ozempic or Wegovy?  No    Do you take the medication Naltrexone?  No    Do you take blood thinners?  No      Prep   Are you currently on dialysis or do you have chronic kidney disease?  No    Do you have a " diagnosis of diabetes?  No    Do you have a diagnosis of cystic fibrosis (CF)?  No    On a regular basis do you go 3 -5 days between bowel movements?  No    BMI > 40?  No    Preferred Pharmacy:    [place], [place] - [address]  [address]  [facility] [address]  Phone: [phone] Fax: [phone] Alternate Fax: [phone], [phone]      Final Scheduling Details   Colonoscopy prep sent?  MiraLAX (No Mag Citrate)    Procedure scheduled  Colonoscopy    Surgeon:  DEDRA     Date of procedure:  11/14/23     Schedule PAC:   No    Location  [facility] - [facility]    Sedation   Moderate Sedation    Patient Reminders:   You will receive a call from a Nurse to review instructions and health history.  This assessment must be completed prior to your procedure.  Failure to complete the Nurse assessment may result in the procedure being cancelled.      On the day of your procedure, please designate an adult(s) who can drive you home stay with you for the next 24 hours. The medicines used in the exam will make you sleepy. You will not be able to drive.      You cannot take public transportation, ride share services, or non-medical taxi service without a responsible caregiver.  Medical transport services are allowed with the requirement that a responsible caregiver will receive you at your destination.  We require that drivers and caregivers are confirmed prior to your procedure.

## 2023-10-10 ENCOUNTER — LAB (OUTPATIENT)
Dept: LAB | Facility: CLINIC | Age: 53
End: 2023-10-10
Payer: COMMERCIAL

## 2023-10-10 DIAGNOSIS — B20 HUMAN IMMUNODEFICIENCY VIRUS (HIV) DISEASE (H): ICD-10-CM

## 2023-10-10 LAB
ALBUMIN SERPL BCG-MCNC: 4.4 G/DL (ref 3.5–5.2)
ALBUMIN UR-MCNC: NEGATIVE MG/DL
ALP SERPL-CCNC: 79 U/L (ref 40–129)
ALT SERPL W P-5'-P-CCNC: 18 U/L (ref 0–70)
ANION GAP SERPL CALCULATED.3IONS-SCNC: 12 MMOL/L (ref 7–15)
APPEARANCE UR: CLEAR
AST SERPL W P-5'-P-CCNC: 21 U/L (ref 0–45)
BASO+EOS+MONOS # BLD AUTO: NORMAL 10*3/UL
BASO+EOS+MONOS NFR BLD AUTO: NORMAL %
BASOPHILS # BLD AUTO: 0.1 10E3/UL (ref 0–0.2)
BASOPHILS NFR BLD AUTO: 1 %
BILIRUB SERPL-MCNC: 0.3 MG/DL
BILIRUB UR QL STRIP: NEGATIVE
BUN SERPL-MCNC: 19 MG/DL (ref 6–20)
CALCIUM SERPL-MCNC: 9.7 MG/DL (ref 8.6–10)
CD3 CELLS # BLD: 3003 CELLS/UL (ref 603–2990)
CD3 CELLS NFR BLD: 80 % (ref 49–84)
CD3+CD4+ CELLS # BLD: 1820 CELLS/UL (ref 441–2156)
CD3+CD4+ CELLS NFR BLD: 49 % (ref 28–63)
CD3+CD4+ CELLS/CD3+CD8+ CLL BLD: 1.67 % (ref 1.4–2.6)
CD3+CD8+ CELLS # BLD: 1089 CELLS/UL (ref 125–1312)
CD3+CD8+ CELLS NFR BLD: 29 % (ref 10–40)
CHLORIDE SERPL-SCNC: 104 MMOL/L (ref 98–107)
CHOLEST SERPL-MCNC: 141 MG/DL
COLOR UR AUTO: YELLOW
CREAT SERPL-MCNC: 0.87 MG/DL (ref 0.67–1.17)
DEPRECATED HCO3 PLAS-SCNC: 22 MMOL/L (ref 22–29)
EGFRCR SERPLBLD CKD-EPI 2021: >90 ML/MIN/1.73M2
EOSINOPHIL # BLD AUTO: 0.2 10E3/UL (ref 0–0.7)
EOSINOPHIL NFR BLD AUTO: 2 %
ERYTHROCYTE [DISTWIDTH] IN BLOOD BY AUTOMATED COUNT: 12.6 % (ref 10–15)
GLUCOSE SERPL-MCNC: 119 MG/DL (ref 70–99)
GLUCOSE UR STRIP-MCNC: NEGATIVE MG/DL
HCT VFR BLD AUTO: 42.5 % (ref 40–53)
HDLC SERPL-MCNC: 25 MG/DL
HGB BLD-MCNC: 15.2 G/DL (ref 13.3–17.7)
HGB UR QL STRIP: NEGATIVE
IMM GRANULOCYTES # BLD: 0 10E3/UL
IMM GRANULOCYTES NFR BLD: 0 %
KETONES UR STRIP-MCNC: NEGATIVE MG/DL
LDLC SERPL CALC-MCNC: 42 MG/DL
LEUKOCYTE ESTERASE UR QL STRIP: NEGATIVE
LYMPHOCYTES # BLD AUTO: 3.9 10E3/UL (ref 0.8–5.3)
LYMPHOCYTES NFR BLD AUTO: 40 %
MCH RBC QN AUTO: 31.7 PG (ref 26.5–33)
MCHC RBC AUTO-ENTMCNC: 35.8 G/DL (ref 31.5–36.5)
MCV RBC AUTO: 89 FL (ref 78–100)
MONOCYTES # BLD AUTO: 0.6 10E3/UL (ref 0–1.3)
MONOCYTES NFR BLD AUTO: 6 %
MUCOUS THREADS #/AREA URNS LPF: PRESENT /LPF
NEUTROPHILS # BLD AUTO: 5.1 10E3/UL (ref 1.6–8.3)
NEUTROPHILS NFR BLD AUTO: 51 %
NITRATE UR QL: NEGATIVE
NONHDLC SERPL-MCNC: 116 MG/DL
NRBC # BLD AUTO: 0 10E3/UL
NRBC BLD AUTO-RTO: 0 /100
PH UR STRIP: 6 [PH] (ref 5–7)
PLATELET # BLD AUTO: 304 10E3/UL (ref 150–450)
POTASSIUM SERPL-SCNC: 3.9 MMOL/L (ref 3.4–5.3)
PROT SERPL-MCNC: 7.4 G/DL (ref 6.4–8.3)
RBC # BLD AUTO: 4.79 10E6/UL (ref 4.4–5.9)
RBC URINE: <1 /HPF
SODIUM SERPL-SCNC: 138 MMOL/L (ref 135–145)
SP GR UR STRIP: 1.03 (ref 1–1.03)
T CELL COMMENT: ABNORMAL
TRIGL SERPL-MCNC: 370 MG/DL
UROBILINOGEN UR STRIP-MCNC: NORMAL MG/DL
WBC # BLD AUTO: 9.9 10E3/UL (ref 4–11)
WBC URINE: <1 /HPF

## 2023-10-10 PROCEDURE — 80061 LIPID PANEL: CPT | Performed by: PATHOLOGY

## 2023-10-10 PROCEDURE — 87536 HIV-1 QUANT&REVRSE TRNSCRPJ: CPT | Performed by: INTERNAL MEDICINE

## 2023-10-10 PROCEDURE — 36415 COLL VENOUS BLD VENIPUNCTURE: CPT | Performed by: PATHOLOGY

## 2023-10-10 PROCEDURE — 86360 T CELL ABSOLUTE COUNT/RATIO: CPT | Performed by: INTERNAL MEDICINE

## 2023-10-10 PROCEDURE — 85025 COMPLETE CBC W/AUTO DIFF WBC: CPT | Performed by: PATHOLOGY

## 2023-10-10 PROCEDURE — 81001 URINALYSIS AUTO W/SCOPE: CPT | Performed by: PATHOLOGY

## 2023-10-10 PROCEDURE — 80053 COMPREHEN METABOLIC PANEL: CPT | Performed by: PATHOLOGY

## 2023-10-10 PROCEDURE — 99000 SPECIMEN HANDLING OFFICE-LAB: CPT | Performed by: PATHOLOGY

## 2023-10-12 LAB
HIV1 RNA # PLAS NAA DL=20: 29 COPIES/ML
HIV1 RNA SERPL NAA+PROBE-LOG#: 1.5 {LOG_COPIES}/ML

## 2023-10-17 ENCOUNTER — OFFICE VISIT (OUTPATIENT)
Dept: INFECTIOUS DISEASES | Facility: CLINIC | Age: 53
End: 2023-10-17
Attending: INTERNAL MEDICINE
Payer: COMMERCIAL

## 2023-10-17 VITALS
WEIGHT: 162.1 LBS | HEART RATE: 91 BPM | OXYGEN SATURATION: 98 % | SYSTOLIC BLOOD PRESSURE: 127 MMHG | DIASTOLIC BLOOD PRESSURE: 79 MMHG | BODY MASS INDEX: 24.65 KG/M2

## 2023-10-17 DIAGNOSIS — B20 HUMAN IMMUNODEFICIENCY VIRUS (HIV) DISEASE (H): Primary | ICD-10-CM

## 2023-10-17 PROCEDURE — G0463 HOSPITAL OUTPT CLINIC VISIT: HCPCS | Mod: 25 | Performed by: INTERNAL MEDICINE

## 2023-10-17 PROCEDURE — G0008 ADMIN INFLUENZA VIRUS VAC: HCPCS | Performed by: INTERNAL MEDICINE

## 2023-10-17 PROCEDURE — 90686 IIV4 VACC NO PRSV 0.5 ML IM: CPT | Performed by: INTERNAL MEDICINE

## 2023-10-17 PROCEDURE — 91320 SARSCV2 VAC 30MCG TRS-SUC IM: CPT | Performed by: INTERNAL MEDICINE

## 2023-10-17 PROCEDURE — 250N000011 HC RX IP 250 OP 636: Performed by: INTERNAL MEDICINE

## 2023-10-17 PROCEDURE — 90480 ADMN SARSCOV2 VAC 1/ONLY CMP: CPT | Performed by: INTERNAL MEDICINE

## 2023-10-17 PROCEDURE — 99214 OFFICE O/P EST MOD 30 MIN: CPT | Performed by: INTERNAL MEDICINE

## 2023-10-17 RX ADMIN — INFLUENZA A VIRUS A/VICTORIA/4897/2022 IVR-238 (H1N1) ANTIGEN (FORMALDEHYDE INACTIVATED), INFLUENZA A VIRUS A/DARWIN/9/2021 SAN-010 (H3N2) ANTIGEN (FORMALDEHYDE INACTIVATED), INFLUENZA B VIRUS B/PHUKET/3073/2013 ANTIGEN (FORMALDEHYDE INACTIVATED), AND INFLUENZA B VIRUS B/MICHIGAN/01/2021 ANTIGEN (FORMALDEHYDE INACTIVATED) 0.5 ML: 15; 15; 15; 15 INJECTION, SUSPENSION INTRAMUSCULAR at 15:25

## 2023-10-17 RX ADMIN — COVID-19 VACCINE, MRNA 30 MCG: 0.05 INJECTION, SUSPENSION INTRAMUSCULAR at 15:23

## 2023-10-17 NOTE — LETTER
10/17/2023       RE: Sara Benjamin  [address]  [place] [address]     Dear Colleague,    Thank you for referring your patient, Sara Benjamin, to the [facility] [place] at [facility]. Please see a copy of my visit note below.     [facility]  Dr. Na Lomax, [facility], [address]  [address] [place] [address]   Patient:  Sara Benjamin, Date of birth 1970, Medical record number [id number]  Date of Visit:  10/24/2023         Assessment and Recommendations:     HIV- patient has been diagnosed since 2001 in [facility]. He has been treated for TB in the past in [facility]. Otherwise he has done well. He was on Biktarvy and then early this year this was switched to Dovato. His CD4 count is excellent at 1820 but his HIV RNA is detectable at 29. It could be a blip and will be undetectable or it might mean he should go back to Biktarvy. I will repeat an HIV RNA in 1 month and have pharmacy follow him as well.     -HIV RNA in 1 months  -continue Dovato for now    Na Lomax MD  Division of Infectious Diseases and International Medicine  [phone]    RTC in 6 months if HIV RNA suppresses, earlier if not    Total visit including reviewing records on the day of the visit was 30 minutes.          History of Infectious Disease Illness:     Past Medical History per Dr. Amor 6/27/23:     Sara reports first learning of his HIV diagnosis about 20 years ago in [facility] in about 2001.  Both he and his wife tested positive.  He did not start treatment right away, but was given medications eventually when his wife was pregnant for this first time. (The eldest child is 13 years old). He reports that he and his wife took the same medicine. Wife took medications during each pregnancy and the children were not infected with HIV.   He cannot confirm, but from his description it seems most  likely that this was Atripla, or the equivalent as dispensed in [facility]. He also remembers taking another medicine for awhile, and then he could stop that one. Again, he cannot confirm, but this sounds like Bactrim.  He reports being treated for active TB once in [place], and another time for a pneumonia.  He does not recall exact dates, and we do not have this documentation.  Other than that he does not recall any significant illnesses.  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     He came to the [facility] via [place] first about 2013.  He has a brother in [place] and came over for work opportunities.       Sara was treated for latent TB while in [place].  The source documentation is scanned into EPIC as of 10/8/2019.     Clinic:  [facility]  Dates of treatment:  7/22/2015 through 11/18/2015  (4 months)  Medication:  rifampin 600mg po daily   4/9/2015  Quantiferon Gold  Positive  4/20/2015  Chest XR - no signs of active TB  Three sputums were negative for AFB growth, 4/29/2015, 5/3/2015, 5/5/2015     Patient presented to me 2/4/2019 on Descovy (tenofovir alafenamide/emtricitabine) and dolutegravir. He reported taking this regimen for the previous 1 year.  He was tolerating well and reported close adherence.  We switched to Biktarvy then to simplify the regimen to a single tablet daily.  Next we switched to Dovato 3/2022 to move to a 2 drug regimen and reduce toxicity risk.     Update:  10/17/23  Patient is still on Dovato. He denies any issues with it. No complaints.   Unfortunately his HIV rna is 29. It was undetectable after starting dovato in 5/23. It had been undectable for a long time. He denies missed doses. He reports being very diligent with his medications.           Past Medical and Surgical History:     Past Medical History:   Diagnosis Date    Condyloma acuminata      HIV (human immunodeficiency virus infection) (H)     on Biktarv       No past surgical history on file.        Family History:   No family history on file.        Social History:     Social History     Tobacco Use    Smoking status: Some Days     Types: Cigarettes    Smokeless tobacco: Never   Vaping Use    Vaping Use: Never used   Substance Use Topics    Alcohol use: Not Currently    Drug use: Not Currently     Social History     Social History Narrative    Not on file            Review of Systems:   CONSTITUTIONAL:  No fevers or chills  EYES: negative for icterus  ENT:  negative for hearing loss, tinnitus, sore throat  RESPIRATORY:  negative for cough, sputum or dyspnea  CARDIOVASCULAR:  negative for chest pain, palpitations  GASTROINTESTINAL:  negative for nausea, vomiting, diarrhea or constipation  GENITOURINARY:  negative for dysuria  HEME:  No easy bruising  INTEGUMENT:  negative for rash or pruritus  NEURO:  Negative for headache         Current Medications:     Current Outpatient Medications   Medication Sig Dispense Refill    Dolutegravir-lamiVUDine  MG TABS Take 1 tablet by mouth daily 30 tablet 3    imiquimod (ALDARA) 5 % external cream Apply topically three times a week 48 packet 5    loratadine (CLARITIN) 10 MG tablet Take 1 tablet (10 mg) by mouth daily (Patient taking differently: Take 10 mg by mouth daily as needed) 30 tablet 11    mupirocin (BACTROBAN) 2 % external ointment Use 2 times a day to affected area behind ear for 1 week (Patient not taking: Reported on 4/26/2022) 30 g 1    SENNA-docusate sodium (SENNA S) 8.6-50 MG tablet Take 1 tablet by mouth 2 times daily as needed (constipation) 60 tablet 11            Immunization History:     Immunization History   Administered Date(s) Administered    COVID-19 12+ (2023-24) (Pfizer) 10/17/2023    COVID-19 Bivalent 12+ (Pfizer) 11/15/2022    COVID-19 MONOVALENT 12+ (Pfizer) 01/09/2022    COVID-19 Vaccine (Elle) 04/09/2021    HepB-Dialysis  05/12/2016, 07/14/2016    Hepatitis B, Adult 01/29/2015, 04/09/2015, 07/23/2015, 12/07/2016    Influenza Vaccine 18-64 (Flublok) 10/08/2019, 11/15/2022    Influenza Vaccine >6 months (Alfuria,Fluzone) 10/17/2023    Influenza Vaccine, 6+MO IM (QUADRIVALENT W/PRESERVATIVES) 12/06/2017    Meningococcal ACWY (Menveo ) 03/20/2018, 04/29/2019    Pneumo Conj 13-V (2010&after) 04/09/2015    Pneumococcal 23 valent 01/29/2015    Tdap (Adult) Unspecified Formulation 12/30/2014            Allergies:   No Known Allergies         Physical Exam:   Vital signs:  /79   Pulse 91   Wt 73.5 kg (162 lb 1.6 oz)   SpO2 98%   BMI 24.65 kg/m      Physical Examination:  GENERAL:  well-developed, well-nourished, seated in no acute distress.  HEENT:  Head is normocephalic, atraumatic   EYES:  Eyes have anicteric sclerae without conjunctival injection   ENT:  Oropharynx is moist without exudates or ulcers. Tongue is midline  NECK:  Supple. No cervical lymphadenopathy  LUNGS:  Clear to auscultation bilateral.   CARDIOVASCULAR:  Regular rate and rhythm with no murmurs, gallops or rubs.  ABDOMEN:  Normal bowel sounds, soft, nontender. No appreciable hepatosplenomegaly.  SKIN:  No acute rashes.    NEUROLOGIC:  Grossly nonfocal. Active x4 extremities         Laboratory Data:     Metabolic Studies   Sodium   Date Value Ref Range Status   10/10/2023 138 135 - 145 mmol/L Final     Comment:     Reference intervals for this test were updated on 09/26/2023 to more accurately reflect our healthy population. There may be differences in the flagging of prior results with similar values performed with this method. Interpretation of those prior results can be made in the context of the updated reference intervals.    05/24/2023 139 136 - 145 mmol/L Final   05/06/2021 141 133 - 144 mmol/L Final   01/07/2020 139 133 - 144 mmol/L Final     Potassium   Date Value Ref Range Status   10/10/2023 3.9 3.4 - 5.3 mmol/L Final   05/24/2023 4.2 3.4 - 5.3 mmol/L  Final   04/18/2022 4.0 3.4 - 5.3 mmol/L Final   12/31/2021 4.1 3.4 - 5.3 mmol/L Final   05/06/2021 4.4 3.4 - 5.3 mmol/L Final   01/07/2020 3.8 3.4 - 5.3 mmol/L Final     Chloride   Date Value Ref Range Status   10/10/2023 104 98 - 107 mmol/L Final   05/24/2023 108 (H) 98 - 107 mmol/L Final   04/18/2022 108 94 - 109 mmol/L Final   12/31/2021 109 94 - 109 mmol/L Final   05/06/2021 111 (H) 94 - 109 mmol/L Final   01/07/2020 110 (H) 94 - 109 mmol/L Final     Carbon Dioxide   Date Value Ref Range Status   05/06/2021 25 20 - 32 mmol/L Final   01/07/2020 25 20 - 32 mmol/L Final     Carbon Dioxide (CO2)   Date Value Ref Range Status   10/10/2023 22 22 - 29 mmol/L Final   05/24/2023 22 22 - 29 mmol/L Final   04/18/2022 26 20 - 32 mmol/L Final   12/31/2021 20 20 - 32 mmol/L Final     Anion Gap   Date Value Ref Range Status   10/10/2023 12 7 - 15 mmol/L Final   05/24/2023 9 7 - 15 mmol/L Final   04/18/2022 8 3 - 14 mmol/L Final   12/31/2021 10 3 - 14 mmol/L Final   05/06/2021 4 3 - 14 mmol/L Final   01/07/2020 4 3 - 14 mmol/L Final     Urea Nitrogen   Date Value Ref Range Status   10/10/2023 19.0 6.0 - 20.0 mg/dL Final   05/24/2023 13.7 6.0 - 20.0 mg/dL Final   04/18/2022 13 7 - 30 mg/dL Final   12/31/2021 10 7 - 30 mg/dL Final   05/06/2021 18 7 - 30 mg/dL Final   01/07/2020 14 7 - 30 mg/dL Final     Creatinine   Date Value Ref Range Status   10/10/2023 0.87 0.67 - 1.17 mg/dL Final   05/24/2023 0.84 0.67 - 1.17 mg/dL Final   05/06/2021 1.00 0.66 - 1.25 mg/dL Final   01/07/2020 0.85 0.66 - 1.25 mg/dL Final     GFR Estimate   Date Value Ref Range Status   10/10/2023 >90 >60 mL/min/1.73m2 Final   05/24/2023 >90 >60 mL/min/1.73m2 Final     Comment:     eGFR calculated using 2021 CKD-EPI equation.   05/06/2021 87 >60 mL/min/[1.73_m2] Final     Comment:     Non  GFR Calc  Starting 12/18/2018, serum creatinine based estimated GFR (eGFR) will be   calculated using the Chronic Kidney Disease Epidemiology Collaboration  "  (CKD-EPI) equation.     01/07/2020 >90 >60 mL/min/[1.73_m2] Final     Comment:     Non  GFR Calc  Starting 12/18/2018, serum creatinine based estimated GFR (eGFR) will be   calculated using the Chronic Kidney Disease Epidemiology Collaboration   (CKD-EPI) equation.       Glucose   Date Value Ref Range Status   10/10/2023 119 (H) 70 - 99 mg/dL Final   05/24/2023 118 (H) 70 - 99 mg/dL Final   04/18/2022 105 (H) 70 - 99 mg/dL Final   12/31/2021 124 (H) 70 - 99 mg/dL Final   05/06/2021 101 (H) 70 - 99 mg/dL Final     Comment:     Non Fasting   01/07/2020 79 70 - 99 mg/dL Final     Hemoglobin A1C   Date Value Ref Range Status   10/12/2022 5.4 <5.7 % Final     Comment:     Normal <5.7%   Prediabetes 5.7-6.4%    Diabetes 6.5% or higher     Note: Adopted from ADA consensus guidelines.     Calcium   Date Value Ref Range Status   10/10/2023 9.7 8.6 - 10.0 mg/dL Final   05/24/2023 9.6 8.6 - 10.0 mg/dL Final   05/06/2021 9.0 8.5 - 10.1 mg/dL Final   01/07/2020 9.3 8.5 - 10.1 mg/dL Final     Lactic Acid   Date Value Ref Range Status   03/18/2019 1.7 0.7 - 2.0 mmol/L Final       Inflammatory Markers No results found for: \"CRP\"    Hepatic Studies    Bilirubin Total   Date Value Ref Range Status   10/10/2023 0.3 <=1.2 mg/dL Final   05/24/2023 0.3 <=1.2 mg/dL Final   05/06/2021 0.4 0.2 - 1.3 mg/dL Final   01/07/2020 0.4 0.2 - 1.3 mg/dL Final     Alkaline Phosphatase   Date Value Ref Range Status   10/10/2023 79 40 - 129 U/L Final   05/24/2023 74 40 - 129 U/L Final   05/06/2021 86 40 - 150 U/L Final   01/07/2020 51 40 - 150 U/L Final     Albumin   Date Value Ref Range Status   10/10/2023 4.4 3.5 - 5.2 g/dL Final   05/24/2023 4.3 3.5 - 5.2 g/dL Final   04/18/2022 4.0 3.4 - 5.0 g/dL Final   12/31/2021 3.7 3.4 - 5.0 g/dL Final   05/06/2021 3.6 3.4 - 5.0 g/dL Final   01/07/2020 3.9 3.4 - 5.0 g/dL Final     AST   Date Value Ref Range Status   10/10/2023 21 0 - 45 U/L Final     Comment:     Reference intervals for this " test were updated on 6/12/2023 to more accurately reflect our healthy population. There may be differences in the flagging of prior results with similar values performed with this method. Interpretation of those prior results can be made in the context of the updated reference intervals.   05/24/2023 19 10 - 50 U/L Final   05/06/2021 17 0 - 45 U/L Final   01/07/2020 15 0 - 45 U/L Final     ALT   Date Value Ref Range Status   10/10/2023 18 0 - 70 U/L Final     Comment:     Reference intervals for this test were updated on 6/12/2023 to more accurately reflect our healthy population. There may be differences in the flagging of prior results with similar values performed with this method. Interpretation of those prior results can be made in the context of the updated reference intervals.     05/24/2023 17 10 - 50 U/L Final   05/06/2021 32 0 - 70 U/L Final   01/07/2020 35 0 - 70 U/L Final       Hematology Studies      WBC   Date Value Ref Range Status   05/06/2021 6.7 4.0 - 11.0 10e9/L Final   01/07/2020 7.8 4.0 - 11.0 10e9/L Final     WBC Count   Date Value Ref Range Status   10/10/2023 9.9 4.0 - 11.0 10e3/uL Final   05/24/2023 8.8 4.0 - 11.0 10e3/uL Final     Absolute Neutrophil   Date Value Ref Range Status   05/06/2021 3.6 1.6 - 8.3 10e9/L Final   01/07/2020 3.9 1.6 - 8.3 10e9/L Final     Absolute Lymphocytes   Date Value Ref Range Status   05/06/2021 2.5 0.8 - 5.3 10e9/L Final   01/07/2020 3.2 0.8 - 5.3 10e9/L Final     Absolute Monocytes   Date Value Ref Range Status   05/06/2021 0.4 0.0 - 1.3 10e9/L Final   01/07/2020 0.5 0.0 - 1.3 10e9/L Final     Absolute Eosinophils   Date Value Ref Range Status   05/06/2021 0.1 0.0 - 0.7 10e9/L Final   01/07/2020 0.1 0.0 - 0.7 10e9/L Final     Hemoglobin   Date Value Ref Range Status   10/10/2023 15.2 13.3 - 17.7 g/dL Final   05/24/2023 15.5 13.3 - 17.7 g/dL Final   05/06/2021 14.9 13.3 - 17.7 g/dL Final   01/07/2020 14.5 13.3 - 17.7 g/dL Final     Hematocrit   Date Value Ref  Range Status   10/10/2023 42.5 40.0 - 53.0 % Final   05/24/2023 43.0 40.0 - 53.0 % Final   05/06/2021 43.7 40.0 - 53.0 % Final   01/07/2020 42.0 40.0 - 53.0 % Final     Platelet Count   Date Value Ref Range Status   10/10/2023 304 150 - 450 10e3/uL Final   05/24/2023 264 150 - 450 10e3/uL Final   05/06/2021 239 150 - 450 10e9/L Final   01/07/2020 208 150 - 450 10e9/L Final   Imaging:  [unfilled]

## 2023-10-24 NOTE — PROGRESS NOTES
[facility]  Dr. Na Lomax, [facility], Floor 3  [address] [id number]   Patient:  Sara Benjamin, Date of birth 1970, Medical record number [id number]  Date of Visit:  10/24/2023         Assessment and Recommendations:     HIV- patient has been diagnosed since 2001 in [facility]. He has been treated for TB in the past in [facility]. Otherwise he has done well. He was on Biktarvy and then early this year this was switched to Dovato. His CD4 count is excellent at 1820 but his HIV RNA is detectable at 29. It could be a blip and will be undetectable or it might mean he should go back to Biktarvy. I will repeat an HIV RNA in 1 month and have pharmacy follow him as well.     -HIV RNA in 1 months  -continue Dovato for now    Immunizations- he got his flu shot and Covid Booster today.    Na Lomax MD  Division of Infectious Diseases and International Medicine  [phone]    RTC in 6 months if HIV RNA suppresses, earlier if not    Total visit including reviewing records on the day of the visit was 30 minutes.          History of Infectious Disease Illness:     Past Medical History per Dr. Amor 6/27/23:     Sara reports first learning of his HIV diagnosis about 20 years ago in [facility] in about 2001.  Both he and his wife tested positive.  He did not start treatment right away, but was given medications eventually when his wife was pregnant for this first time. (The eldest child is 13 years old). He reports that he and his wife took the same medicine. Wife took medications during each pregnancy and the children were not infected with HIV.   He cannot confirm, but from his description it seems most likely that this was Atripla, or the equivalent as dispensed in [facility]. He also remembers taking another medicine for awhile, and then he could stop that one. Again, he cannot confirm, but this sounds like Bactrim.  He reports being treated for active TB  once in [place], and another time for a pneumonia.  He does not recall exact dates, and we do not have this documentation.  Other than that he does not recall any significant illnesses.  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     He came to the [facility] via [place], [place] first about 2013.  He has a brother in [place] and came over for work opportunities.       Sara was treated for latent TB while in [place], [place].  The source documentation is scanned into EPIC as of 10/8/2019.     Clinic:  [facility]  Dates of treatment:  7/22/2015 through 11/18/2015  (4 months)  Medication:  rifampin 600mg po daily   4/9/2015  Quantiferon Gold  Positive  4/20/2015  Chest XR - no signs of active TB  Three sputums were negative for AFB growth, 4/29/2015, 5/3/2015, 5/5/2015     Patient presented to me 2/4/2019 on Descovy (tenofovir alafenamide/emtricitabine) and dolutegravir. He reported taking this regimen for the previous 1 year.  He was tolerating well and reported close adherence.  We switched to Biktarvy then to simplify the regimen to a single tablet daily.  Next we switched to Dovato 3/2022 to move to a 2 drug regimen and reduce toxicity risk.     Update:  10/17/23  Patient is still on Dovato. He denies any issues with it. No complaints.   Unfortunately his HIV rna is 29. It was undetectable after starting dovato in 5/23. It had been undectable for a long time. He denies missed doses. He reports being very diligent with his medications.           Past Medical and Surgical History:     Past Medical History:   Diagnosis Date    Condyloma acuminata     HIV (human immunodeficiency virus infection) (H)     on Biktarvy       No past surgical history on file.        Family History:   No family history on file.        Social History:     Social History     Tobacco Use    Smoking status: Some Days      Types: Cigarettes    Smokeless tobacco: Never   Vaping Use    Vaping Use: Never used   Substance Use Topics    Alcohol use: Not Currently    Drug use: Not Currently     Social History     Social History Narrative    Not on file            Review of Systems:   CONSTITUTIONAL:  No fevers or chills  EYES: negative for icterus  ENT:  negative for hearing loss, tinnitus, sore throat  RESPIRATORY:  negative for cough, sputum or dyspnea  CARDIOVASCULAR:  negative for chest pain, palpitations  GASTROINTESTINAL:  negative for nausea, vomiting, diarrhea or constipation  GENITOURINARY:  negative for dysuria  HEME:  No easy bruising  INTEGUMENT:  negative for rash or pruritus  NEURO:  Negative for headache         Current Medications:     Current Outpatient Medications   Medication Sig Dispense Refill    Dolutegravir-lamiVUDine  MG TABS Take 1 tablet by mouth daily 30 tablet 3    imiquimod (ALDARA) 5 % external cream Apply topically three times a week 48 packet 5    loratadine (CLARITIN) 10 MG tablet Take 1 tablet (10 mg) by mouth daily (Patient taking differently: Take 10 mg by mouth daily as needed) 30 tablet 11    mupirocin (BACTROBAN) 2 % external ointment Use 2 times a day to affected area behind ear for 1 week (Patient not taking: Reported on 4/26/2022) 30 g 1    SENNA-docusate sodium (SENNA S) 8.6-50 MG tablet Take 1 tablet by mouth 2 times daily as needed (constipation) 60 tablet 11            Immunization History:     Immunization History   Administered Date(s) Administered    COVID-19 12+ (2023-24) (Pfizer) 10/17/2023    COVID-19 Bivalent 12+ (Pfizer) 11/15/2022    COVID-19 MONOVALENT 12+ (Pfizer) 01/09/2022    COVID-19 Vaccine (ImageBrief) 04/09/2021    HepB-Dialysis 05/12/2016, 07/14/2016    Hepatitis B, Adult 01/29/2015, 04/09/2015, 07/23/2015, 12/07/2016    Influenza Vaccine 18-64 (Flublok) 10/08/2019, 11/15/2022    Influenza Vaccine >6 months (Alfuria,Fluzone) 10/17/2023    Influenza Vaccine, 6+MO IM  (QUADRIVALENT W/PRESERVATIVES) 12/06/2017    Meningococcal ACWY (Menveo ) 03/20/2018, 04/29/2019    Pneumo Conj 13-V (2010&after) 04/09/2015    Pneumococcal 23 valent 01/29/2015    Tdap (Adult) Unspecified Formulation 12/30/2014            Allergies:   No Known Allergies         Physical Exam:   Vital signs:  /79   Pulse 91   Wt 73.5 kg (162 lb 1.6 oz)   SpO2 98%   BMI 24.65 kg/m      Physical Examination:  GENERAL:  well-developed, well-nourished, seated in no acute distress.  HEENT:  Head is normocephalic, atraumatic   EYES:  Eyes have anicteric sclerae without conjunctival injection   ENT:  Oropharynx is moist without exudates or ulcers. Tongue is midline  NECK:  Supple. No cervical lymphadenopathy  LUNGS:  Clear to auscultation bilateral.   CARDIOVASCULAR:  Regular rate and rhythm with no murmurs, gallops or rubs.  ABDOMEN:  Normal bowel sounds, soft, nontender. No appreciable hepatosplenomegaly.  SKIN:  No acute rashes.    NEUROLOGIC:  Grossly nonfocal. Active x4 extremities         Laboratory Data:     Metabolic Studies   Sodium   Date Value Ref Range Status   10/10/2023 138 135 - 145 mmol/L Final     Comment:     Reference intervals for this test were updated on 09/26/2023 to more accurately reflect our healthy population. There may be differences in the flagging of prior results with similar values performed with this method. Interpretation of those prior results can be made in the context of the updated reference intervals.    05/24/2023 139 136 - 145 mmol/L Final   05/06/2021 141 133 - 144 mmol/L Final   01/07/2020 139 133 - 144 mmol/L Final     Potassium   Date Value Ref Range Status   10/10/2023 3.9 3.4 - 5.3 mmol/L Final   05/24/2023 4.2 3.4 - 5.3 mmol/L Final   04/18/2022 4.0 3.4 - 5.3 mmol/L Final   12/31/2021 4.1 3.4 - 5.3 mmol/L Final   05/06/2021 4.4 3.4 - 5.3 mmol/L Final   01/07/2020 3.8 3.4 - 5.3 mmol/L Final     Chloride   Date Value Ref Range Status   10/10/2023 104 98 - 107 mmol/L  Final   05/24/2023 108 (H) 98 - 107 mmol/L Final   04/18/2022 108 94 - 109 mmol/L Final   12/31/2021 109 94 - 109 mmol/L Final   05/06/2021 111 (H) 94 - 109 mmol/L Final   01/07/2020 110 (H) 94 - 109 mmol/L Final     Carbon Dioxide   Date Value Ref Range Status   05/06/2021 25 20 - 32 mmol/L Final   01/07/2020 25 20 - 32 mmol/L Final     Carbon Dioxide (CO2)   Date Value Ref Range Status   10/10/2023 22 22 - 29 mmol/L Final   05/24/2023 22 22 - 29 mmol/L Final   04/18/2022 26 20 - 32 mmol/L Final   12/31/2021 20 20 - 32 mmol/L Final     Anion Gap   Date Value Ref Range Status   10/10/2023 12 7 - 15 mmol/L Final   05/24/2023 9 7 - 15 mmol/L Final   04/18/2022 8 3 - 14 mmol/L Final   12/31/2021 10 3 - 14 mmol/L Final   05/06/2021 4 3 - 14 mmol/L Final   01/07/2020 4 3 - 14 mmol/L Final     Urea Nitrogen   Date Value Ref Range Status   10/10/2023 19.0 6.0 - 20.0 mg/dL Final   05/24/2023 13.7 6.0 - 20.0 mg/dL Final   04/18/2022 13 7 - 30 mg/dL Final   12/31/2021 10 7 - 30 mg/dL Final   05/06/2021 18 7 - 30 mg/dL Final   01/07/2020 14 7 - 30 mg/dL Final     Creatinine   Date Value Ref Range Status   10/10/2023 0.87 0.67 - 1.17 mg/dL Final   05/24/2023 0.84 0.67 - 1.17 mg/dL Final   05/06/2021 1.00 0.66 - 1.25 mg/dL Final   01/07/2020 0.85 0.66 - 1.25 mg/dL Final     GFR Estimate   Date Value Ref Range Status   10/10/2023 >90 >60 mL/min/1.73m2 Final   05/24/2023 >90 >60 mL/min/1.73m2 Final     Comment:     eGFR calculated using 2021 CKD-EPI equation.   05/06/2021 87 >60 mL/min/[1.73_m2] Final     Comment:     Non  GFR Calc  Starting 12/18/2018, serum creatinine based estimated GFR (eGFR) will be   calculated using the Chronic Kidney Disease Epidemiology Collaboration   (CKD-EPI) equation.     01/07/2020 >90 >60 mL/min/[1.73_m2] Final     Comment:     Non  GFR Calc  Starting 12/18/2018, serum creatinine based estimated GFR (eGFR) will be   calculated using the Chronic Kidney Disease  "Epidemiology Collaboration   (CKD-EPI) equation.       Glucose   Date Value Ref Range Status   10/10/2023 119 (H) 70 - 99 mg/dL Final   05/24/2023 118 (H) 70 - 99 mg/dL Final   04/18/2022 105 (H) 70 - 99 mg/dL Final   12/31/2021 124 (H) 70 - 99 mg/dL Final   05/06/2021 101 (H) 70 - 99 mg/dL Final     Comment:     Non Fasting   01/07/2020 79 70 - 99 mg/dL Final     Hemoglobin A1C   Date Value Ref Range Status   10/12/2022 5.4 <5.7 % Final     Comment:     Normal <5.7%   Prediabetes 5.7-6.4%    Diabetes 6.5% or higher     Note: Adopted from ADA consensus guidelines.     Calcium   Date Value Ref Range Status   10/10/2023 9.7 8.6 - 10.0 mg/dL Final   05/24/2023 9.6 8.6 - 10.0 mg/dL Final   05/06/2021 9.0 8.5 - 10.1 mg/dL Final   01/07/2020 9.3 8.5 - 10.1 mg/dL Final     Lactic Acid   Date Value Ref Range Status   03/18/2019 1.7 0.7 - 2.0 mmol/L Final       Inflammatory Markers No results found for: \"CRP\"    Hepatic Studies    Bilirubin Total   Date Value Ref Range Status   10/10/2023 0.3 <=1.2 mg/dL Final   05/24/2023 0.3 <=1.2 mg/dL Final   05/06/2021 0.4 0.2 - 1.3 mg/dL Final   01/07/2020 0.4 0.2 - 1.3 mg/dL Final     Alkaline Phosphatase   Date Value Ref Range Status   10/10/2023 79 40 - 129 U/L Final   05/24/2023 74 40 - 129 U/L Final   05/06/2021 86 40 - 150 U/L Final   01/07/2020 51 40 - 150 U/L Final     Albumin   Date Value Ref Range Status   10/10/2023 4.4 3.5 - 5.2 g/dL Final   05/24/2023 4.3 3.5 - 5.2 g/dL Final   04/18/2022 4.0 3.4 - 5.0 g/dL Final   12/31/2021 3.7 3.4 - 5.0 g/dL Final   05/06/2021 3.6 3.4 - 5.0 g/dL Final   01/07/2020 3.9 3.4 - 5.0 g/dL Final     AST   Date Value Ref Range Status   10/10/2023 21 0 - 45 U/L Final     Comment:     Reference intervals for this test were updated on 6/12/2023 to more accurately reflect our healthy population. There may be differences in the flagging of prior results with similar values performed with this method. Interpretation of those prior results can be made " in the context of the updated reference intervals.   05/24/2023 19 10 - 50 U/L Final   05/06/2021 17 0 - 45 U/L Final   01/07/2020 15 0 - 45 U/L Final     ALT   Date Value Ref Range Status   10/10/2023 18 0 - 70 U/L Final     Comment:     Reference intervals for this test were updated on 6/12/2023 to more accurately reflect our healthy population. There may be differences in the flagging of prior results with similar values performed with this method. Interpretation of those prior results can be made in the context of the updated reference intervals.     05/24/2023 17 10 - 50 U/L Final   05/06/2021 32 0 - 70 U/L Final   01/07/2020 35 0 - 70 U/L Final       Hematology Studies      WBC   Date Value Ref Range Status   05/06/2021 6.7 4.0 - 11.0 10e9/L Final   01/07/2020 7.8 4.0 - 11.0 10e9/L Final     WBC Count   Date Value Ref Range Status   10/10/2023 9.9 4.0 - 11.0 10e3/uL Final   05/24/2023 8.8 4.0 - 11.0 10e3/uL Final     Absolute Neutrophil   Date Value Ref Range Status   05/06/2021 3.6 1.6 - 8.3 10e9/L Final   01/07/2020 3.9 1.6 - 8.3 10e9/L Final     Absolute Lymphocytes   Date Value Ref Range Status   05/06/2021 2.5 0.8 - 5.3 10e9/L Final   01/07/2020 3.2 0.8 - 5.3 10e9/L Final     Absolute Monocytes   Date Value Ref Range Status   05/06/2021 0.4 0.0 - 1.3 10e9/L Final   01/07/2020 0.5 0.0 - 1.3 10e9/L Final     Absolute Eosinophils   Date Value Ref Range Status   05/06/2021 0.1 0.0 - 0.7 10e9/L Final   01/07/2020 0.1 0.0 - 0.7 10e9/L Final     Hemoglobin   Date Value Ref Range Status   10/10/2023 15.2 13.3 - 17.7 g/dL Final   05/24/2023 15.5 13.3 - 17.7 g/dL Final   05/06/2021 14.9 13.3 - 17.7 g/dL Final   01/07/2020 14.5 13.3 - 17.7 g/dL Final     Hematocrit   Date Value Ref Range Status   10/10/2023 42.5 40.0 - 53.0 % Final   05/24/2023 43.0 40.0 - 53.0 % Final   05/06/2021 43.7 40.0 - 53.0 % Final   01/07/2020 42.0 40.0 - 53.0 % Final     Platelet Count   Date Value Ref Range Status   10/10/2023 304 150 -  450 10e3/uL Final   05/24/2023 264 150 - 450 10e3/uL Final   05/06/2021 239 150 - 450 10e9/L Final   01/07/2020 208 150 - 450 10e9/L Final       Imaging:  [unfilled]

## 2023-10-31 ENCOUNTER — TELEPHONE (OUTPATIENT)
Dept: GASTROENTEROLOGY | Facility: CLINIC | Age: 53
End: 2023-10-31
Payer: COMMERCIAL

## 2023-10-31 DIAGNOSIS — Z12.11 ENCOUNTER FOR SCREENING COLONOSCOPY: Primary | ICD-10-CM

## 2023-10-31 RX ORDER — BISACODYL 5 MG/1
TABLET, DELAYED RELEASE ORAL
Qty: 4 TABLET | Refills: 0 | Status: SHIPPED | OUTPATIENT
Start: 2023-10-31

## 2023-10-31 NOTE — LETTER
October 31, 2023      Sara Benjamin  [address]  [place] [address]              Dear Sara,        Colonoscopy      Procedure date: 11/14/23    Anticipated arrival time: 9:00 AM   (Procedure Times are Subject to Change)    [facility]; [address], 5th Floor, [place] [address] - Check in location: 5th Floor. Parking information: Self pay parking is available in [place] [address] lot directly across from the  main entrance of the [facility] and [place] to this lot is on [facility]. In  addition, self pay parking is also available in [address] ([address]).  We have dedicated patient only spots on 1st floor of [address] ramp.  services are available for patients with limited mobility. Services available Monday-Friday, 7:00a.m.-  5:00p.m.      Important Procedure Reminders:     Prep Instructions:   Instructions on how to prepare for your upcoming procedure are found below. Please read instructions carefully. Deviation from instructions may result in less than desired outcomes and procedure may need to be rescheduled. If you have additional questions regarding how to prepare for your upcoming procedure, please contact our endoscopy pre assessment nurses at [phone] option 4.      Policy:   On the day of your procedure, please designatean adult(s) who can drive you home stay with you for the next 24 hours. The medicines used in the exam will make you sleepy. You will not be able to drive. You cannot take public transportation, ride share services, or non-medical taxi service without a responsible caregiver.  Medical transport services are allowed with the requirement that a responsible caregiver will receive you at your destination.  We require that drivers and caregivers are confirmed prior to your procedure.    Day of procedure:  Please do not wear jewelry (i.e. earrings, rings, necklaces, watches, etc) . Leave your purse, billfold, credit cards, and  other valuables at home.   Bring insurance card and ID.     To cancel or reschedule your procedure:   Please call our endoscopy scheduling team at: [facility]: [phone], option 2. Monday through Friday, 7:00am-5:00pm.      Medication Reminders:    Please note the following medication holding recommendations:   N/A    ----------------------------------------------------------------------------------------------------------------------------------------------------------------------------------------------------------------------------------------------------------------------      STANDARD Golytely (Colyte, Nulytely)  Prep Instructions for your Colonoscopy    Bowel prep has been sent to [place], [place] - [address] [facility] [phone]      Please read these instructions carefully at least 7 days prior to your colonoscopy procedure. Be sure to follow all directions completely. The inside of your colon must be clean to allow for a complete examination for the presence of any growths, polyps, and/or abnormalities, as well as their biopsy or removal. A number of tips are included in order to make this part of the procedure as comfortable as possible.    Getting ready:   You will receive a call from a Nurse to review instructions and health history.  This assessment must be completed prior to your procedure.  Failure to complete the Nurse assessment phone call may result in the procedure being cancelled.  You must arrange for an adult to drive you home after your exam. Your colonoscopy cannot be done unless you have a ride. If you need to use public transportation, someone must ride with you and stay with you for a minimum of 6-24 hours.  Check with your insurance company to be sure they will cover this exam.    7 days before the exam:  Talk to your prescribing provider: If you take blood thinners (such as Coumadin, Plavix, Xarelto, Eliquis, Lovenox, or others),  these medications may need to be stopped temporarily before your procedure. Your prescribing provider will tell you what to do.   Talk to your prescribing provider: If you take prescription NSAIDS (such as Sulindac, Celebrex, Mobic, Relafen). Your prescribing provider will tell you what to do.   Stop taking fiber supplements (bran, Metamucil, Fibercon), multi-vitamins with iron, and medicines that contain iron.  Continue taking prescribed aspirin; talk to your prescribing doctor with any concerns.  Stop eating corn, nuts and foods with seeds.  These can stay in the colon for days.  If you have diabetes:  Ask to have your exam early in the morning.  Also, ask your doctor if you should change your diet or medicines.    3 days before the exam:  Begin a low-fiber diet: No raw fruits or vegetables, whole wheat, seeds, nuts, popcorn or other high-fiber foods (see list below). No Olestra (a fat substitute).    One day before the exam:  You can have a light, low-fiber breakfast. But drink only clear liquids after 9 a.m. (see list below). Drink at least 8 to 10 full glasses of clear liquids during the day.   Stop taking NSAID pain relievers, such as Advil, Ibuprofen, Motrin, etc.  You may take Tylenol.  Fill the jug that contains the Golytely powder with warm water. Cover and shake until well mixed. Use a full gallon of water. Chill for 3 hours, but do not add ice.  You will start drinking half of the Golytely solution at 6 p.m. The timing of drinking the 2nd half of the Golytely solution depends on your exam time. See Step 2 below.  After you start drinking the solution, stay near a toilet. You may have watery stools (diarrhea), mild cramping, bloating , and nausea.     Step One:  At 3 p.m., take 2 tablets of Dulcolax (bisacodyl).  At 6 p.m. start drinking the Golytely solution. Drink an 8-ounce glass every 15 minutes until the jug is half empty. Drink each glass quickly.     Step Two:   If you arrive before 11 AM:  At 11  p.m. on the day before your exam:  Take 2 Dulcolax (Bisacodyl) tablets.   Start drinking the other half of the Golytely jug. Drink one 8-ounce glass every 15 minutes until the jug is empty. Drink each glass quickly.  If you arrive after 11 AM:  At 6 AM on the day of the exam:  Take 2 tablets of Dulcolax (Bisacodyl).   Drink the other half of the Golytely jug.   Drink one 8-ounce glass every 15 minutes until the jug is empty.   Drink each glass quickly.   You should finish the prep 4 hours before the exam.      Day of exam:    You may take your necessary morning medications with sips of water  Do not take diabetes medicine by mouth until after your exam.  You may drink clear liquids only up until 2 hours before your arrival time.  Do not smoke, chew tobacco, or swallow anything, including water or gum for at least 2 hours before your arrival time. This is a safety issue. Your procedure could be cancelled if you do not follow directions.  Please do not wear jewelry (i.e. earrings, rings, necklaces, watches, etc) . Leave your purse, billfold, credit cards, and other valuables at home.   Please arrive with a responsible adult who can take you home after the test and stay with you for a minimum of 24 hours: The medicine used will make you sleepy and forgetful. If you do not have someone to take you home, we will cancel your procedure. If using public transportation you must have someone to ride with you.  Please perform your nebulizer treatments and airway clearance therapy in the morning prior to the procedure (if applicable).  If you have asthma, bring your inhalers.      CLEAR LIQUID DIET   You may have:  Water, tea, coffee (no milk or cream)  Soda pop, Gatorade (not red or purple)  Jell-O, Popsicles (no milk or fruit pieces - not red or purple)  Fat-free soup broth or bouillon  Plain hard candy, such as clear life savers (not red or purple)  Clear juices and fruit-flavored drinks, such as apple juice, white grape  juice, Hi-C, and Curt-Aid (not red or purple)   Do not have:  Milk or milk products such as ice cream, malts or shakes, or coffee creamer  Red or purple drinks of any kind such as cranberry juice or grape juice. Avoid red or purple Jell-O, Popsicles, Curt-Aid, sorbet, sherbet and candy  Juices with pulp such as orange, grapefruit, pineapple or tomato juice  Cream soups of any kind  Alcohol and beer  Protein drinks or protein powder     LOW FIBER DIET   You may have:    Starches: White bread, rolls, biscuits, croissants, Mai toast, white flour tortillas, waffles, pancakes, Slovenian toast; white rice, noodles, pasta, macaroni; cooked and peeled potatoes; plain crackers, saltines; cooked farina or cream of rice; puffed rice, corn flakes, Rice Krispies, Special K   Vegetables: tender cooked and canned, vegetable broths  Fruits and fruit juices: Strained fruit juice, canned fruit without seeds or skin (not pineapple), applesauce, pear sauce, ripe bananas, melons (not watermelon)   Milk products: Milk (plain or flavored), cheese, cottage cheese, yogurt (no berries), custard, ice cream    Proteins: Tender, well-cooked ground beef, lamb, veal, ham, pork, chicken, turkey, fish or organ meats; eggs; creamy peanut butter   Fats and condiments:  Margarine, butter, oils, mayonnaise, sour cream, salad dressing, plain gravy; spices, cooked herbs; sugar, clear jelly, honey, syrup   Snacks, sweets and drinks: Pretzels, hard candy; plain cakes and cookies (no nuts or seeds); gelatin, plain pudding, sherbet, Popsicles; coffee, tea, carbonated ( fizzy ) drinks Do not have:    Starches: Breads or rolls that contain nuts, seeds or fruit; whole wheat or whole grain breads that contain more than 1 gram of fiber per slice; cornbread; corn or whole wheat tortillas; potatoes with skin; brown rice, wild rice, kasha (buckwheat), and oatmeal   Vegetables: Any raw or steamed vegetables; vegetables with seeds; corn in any form   Fruits and fruit  juices: Prunes, prune juice, raisins and other dried fruits, berries and other fruits with seeds, canned pineapple juices with pulp such as orange, grapefruit, pineapple or tomato juice  Milk products: Any yogurt with nuts, seeds or berries   Proteins: Tough, fibrous meats with gristle; cooked dried beans, peas or lentils; crunchy peanut butter  Fats and condiments: Pickles, olives, relish, horseradish; jam, marmalade, preserves   Snacks, sweets and drinks: Popcorn, nuts, seeds, granola, coconut, candies made with nuts or seeds; all desserts that contain nuts, seeds, raisins and other dried fruits, coconut, whole grains or bran.        FAQ:    How do you know if your colon is cleaned out?   After completing the bowel prep, your bowel movements should be all liquid and yellow. Your bowel movements will look similar to urine in the toilet. If there are pieces of stool (poop) in the toilet, or if you can't see to the bottom of the toilet, please call our office for advice. Call [phone] and ask to speak with a nurse.   Why do you need a responsible  to take you home and stay with you?  We require a responsible adult to take you home for your safety. The sedation medicines used to relax you during the procedure can impair your judgement and reaction time, make you forgetful and possible a little unsteady. Do not drive, make any important decisions, or sign any legal documents for 24 hours after your procedure.   It is normal to feel bloated and gassy after your procedure. Walking will help move the air through your colon. You can take non-aspirin pain relievers that contain acetaminophen (Tylenol).   When can you eat after your procedure?  You may resume your normal diet when you feel ready, unless advised otherwise by the doctor performing your procedure. Do not drink alcohol for 24 hours after your procedure.   You many resume normal activities (work, exercise, etc.) after 24 hours.   When will you get test  results?  You should have your procedure results and any lab results (if applicable) by letter, MyChart message, or phone call within 2 weeks. If you have any questions, please call the doctor that referred you for the procedure.       Thank you for choosing [facility] for your procedure. If you are sent a survey regarding your care, please take the time to complete the questionnaire. We value your feedback!             Updated: 6/22/2022

## 2023-10-31 NOTE — TELEPHONE ENCOUNTER
Pre assessment completed for upcoming procedure.  With assistance of Riccardo  id [id number]    Procedure details:    Patient scheduled for Colonoscopy  on 11/14/23.     Arrival time: 0900. Procedure time 1000    Pre op exam needed? N/A    [facility]; [address] [place] [address], 5th Floor, [place] [address]    Sedation type: Conscious sedation     Indication for procedure: screening     COVID policy reviewed.    Designated  policy reviewed. Instructed to have someone stay 6 hours post procedure.       Chart review:     Electronic implanted devices? No    Recent diagnosis of diverticulitis within the last 6 weeks?  No    Diabetic? No    Medication review:    Anticoagulants? No    NSAIDS? No    Other medication HOLDING recommendations:  N/A      Prep for procedure:     Bowel prep recommendation: Standard Golytely   Due to:  easier prep due to language barrier.    Prep instructions sent via letter Updated.  Bowel prep script sent to [place], [place] - [address] [place] [address] [phone]    Reviewed procedure prep instructions in great detail. Patient to call back endoscopy team [phone] option 4 should they not receive letter by end of the week. Patient agreed to plan.     Patient verbalized understanding and had no questions or concerns at this time.        Shari Vidales RN  Endoscopy Procedure Pre Assessment RN  [phone] option 4

## 2023-11-01 ENCOUNTER — OFFICE VISIT (OUTPATIENT)
Dept: PHARMACY | Facility: CLINIC | Age: 53
End: 2023-11-01
Attending: INTERNAL MEDICINE
Payer: COMMERCIAL

## 2023-11-01 DIAGNOSIS — Z78.9 TAKES DIETARY SUPPLEMENTS: ICD-10-CM

## 2023-11-01 DIAGNOSIS — Z23 NEED FOR PNEUMOCOCCAL VACCINE: ICD-10-CM

## 2023-11-01 DIAGNOSIS — F17.200 CURRENT SMOKER: ICD-10-CM

## 2023-11-01 DIAGNOSIS — B20 HUMAN IMMUNODEFICIENCY VIRUS (HIV) DISEASE (H): ICD-10-CM

## 2023-11-01 DIAGNOSIS — K59.00 CONSTIPATION, UNSPECIFIED CONSTIPATION TYPE: ICD-10-CM

## 2023-11-01 DIAGNOSIS — B20 HUMAN IMMUNODEFICIENCY VIRUS (HIV) DISEASE (H): Primary | Chronic | ICD-10-CM

## 2023-11-01 PROCEDURE — 99605 MTMS BY PHARM NP 15 MIN: CPT | Performed by: PHARMACIST

## 2023-11-01 PROCEDURE — 99607 MTMS BY PHARM ADDL 15 MIN: CPT | Performed by: PHARMACIST

## 2023-11-01 NOTE — PATIENT INSTRUCTIONS
"Recommendations from today's MTM visit:                                                      Start Nicotine 4 mg gum - reviewed how to take and avoid acidic beverages   Get PCV20 and shingrix vaccines at the pharmacy   colonoscopy medications   Lab in about 2-3 weeks to repeat viral load    Follow-up: 1 month     It was great speaking with you today.  I value your experience and would be very thankful for your time in providing feedback in our clinic survey. In the next few days, you may receive an email or text message from [employer] with a link to a survey related to your  clinical pharmacist.\"     To schedule another MTM appointment, please call the clinic directly or you may call the MTM scheduling line at [phone] or toll-free at [phone].     My Clinical Pharmacist's contact information:                                                      Please feel free to contact me with any questions or concerns you have.      Joana Mello, PharmD, AAHIVP  Medication Therapy Management Pharmacist   [phone]    "

## 2023-11-01 NOTE — PROGRESS NOTES
Medication Therapy Management (MTM) Encounter    ASSESSMENT:                            Medication Adherence/Access: No issues identified    HIV:   No drug interactions with Dovato and good adherence. Recheck HIV RNA in a few weeks per Dr. Lomax. HIV RNA around <50 copies is still very good HIV control and no established increased risk for resistance at this level of viremia so anticipate he should be able to continue Dovato. Will push back lab appt for 10 days after colonoscopy in case any extra inflammation from that. Reviewed drug interactions with polyvalent cations and to take these at night (if started in the future) to separate from Dovato.     Current smoker  He's interested in cutting back. Encouraged to quit - reviewed benefits. Interested in nicotine gum. Will start with higher dose gum even though first cigarette isn't within an hour since benefits may outweigh risks and want gum to be effective for any withdrawal symptoms.    Constipation:   Stable, upcoming colonoscopy    Prevention:   Due for PCV20 and Shingrix. Needs to fill PEG and bisacodyl. Patient willing to get today.    PLAN:                            Start Nicotine 4 mg gum - reviewed how to take and avoid acidic beverages   Get PCV20 and shingrix vaccines at the pharmacy   colonoscopy medications   Lab in about 2-3 weeks to repeat viral load    Follow-up: 1 month     SUBJECTIVE/OBJECTIVE:                          Sara Benjamin is a 53 year old male coming in for an initial visit. He was referred to me from Dr. Lomax.      Reason for visit: HIV.    Allergies/ADRs: Reviewed in chart  Past Medical History: Reviewed in chart  Tobacco: He reports that he has been smoking cigarettes. He has never used smokeless tobacco.Nicotine/Tobacco Cessation Plan:   See below  Alcohol: none    Medication Adherence/Access: no issues reported. Rarely misses doses.    HIV:   Dovato once daily in the morning.   Side effects:   Diagnosis:   Past regimens:  Tivicay plus Descovy, Biktarvy   Genotype: n/a  HIV VL: 29 on 10/10/23 - previously undetectable  CD4: 1,820 on 10/10/23  Immunizations: up to date on all except pcv20 and shingrix    Current smoker  5-6 per day for 40 years   Has quit before for a few months at a time   Has cut down from 1 pack per day   He's interested in reducing smoking  First cigarette is about an hour after waking up.     Constipation:   Senna docusate 8.6-50 mg 1-2 times daily as needed for constipation   No current issues   Have upcoming colonoscopy     Prevention:   Upcoming colonoscopy     Today's Vitals: There were no vitals taken for this visit.  ----------------    I spent 45 minutes with this patient today. All changes were made via collaborative practice agreement with Dr. Lomax. A copy of the visit note was provided to the patient's provider(s).    A summary of these recommendations was declined by the patient.     Medication Therapy Recommendations  Current smoker    Rationale: Untreated condition - Needs additional medication therapy - Indication   Recommendation: Start Medication - RA Nicotine Gum 4 MG Gum   Status: Accepted per CPA         Need for pneumococcal vaccine    Rationale: Untreated condition - Needs additional medication therapy - Indication   Recommendation: Start Medication   Status: Accepted per CPA

## 2023-11-14 ENCOUNTER — HOSPITAL ENCOUNTER (OUTPATIENT)
Facility: AMBULATORY SURGERY CENTER | Age: 53
Discharge: HOME | End: 2023-11-14
Attending: INTERNAL MEDICINE | Admitting: INTERNAL MEDICINE
Payer: COMMERCIAL

## 2023-11-14 VITALS
HEIGHT: 68 IN | DIASTOLIC BLOOD PRESSURE: 78 MMHG | RESPIRATION RATE: 16 BRPM | WEIGHT: 142 LBS | BODY MASS INDEX: 21.52 KG/M2 | HEART RATE: 67 BPM | OXYGEN SATURATION: 100 % | SYSTOLIC BLOOD PRESSURE: 114 MMHG | TEMPERATURE: 97 F

## 2023-11-14 LAB — COLONOSCOPY: NORMAL

## 2023-11-14 PROCEDURE — 45378 DIAGNOSTIC COLONOSCOPY: CPT | Mod: 33 | Performed by: INTERNAL MEDICINE

## 2023-11-14 RX ORDER — SODIUM CHLORIDE, SODIUM LACTATE, POTASSIUM CHLORIDE, CALCIUM CHLORIDE 600; 310; 30; 20 MG/100ML; MG/100ML; MG/100ML; MG/100ML
INJECTION, SOLUTION INTRAVENOUS CONTINUOUS
Status: DISCONTINUED | OUTPATIENT
Start: 2023-11-14 | End: 2023-11-14 | Stop reason: HOSPADM

## 2023-11-14 RX ORDER — FENTANYL CITRATE 50 UG/ML
INJECTION, SOLUTION INTRAMUSCULAR; INTRAVENOUS PRN
Status: DISCONTINUED | OUTPATIENT
Start: 2023-11-14 | End: 2023-11-14 | Stop reason: HOSPADM

## 2023-11-14 RX ORDER — NALOXONE HYDROCHLORIDE 0.4 MG/ML
0.4 INJECTION, SOLUTION INTRAMUSCULAR; INTRAVENOUS; SUBCUTANEOUS
Status: DISCONTINUED | OUTPATIENT
Start: 2023-11-14 | End: 2023-11-15 | Stop reason: HOSPADM

## 2023-11-14 RX ORDER — LIDOCAINE 40 MG/G
CREAM TOPICAL
Status: DISCONTINUED | OUTPATIENT
Start: 2023-11-14 | End: 2023-11-14 | Stop reason: HOSPADM

## 2023-11-14 RX ORDER — ONDANSETRON 2 MG/ML
4 INJECTION INTRAMUSCULAR; INTRAVENOUS EVERY 6 HOURS PRN
Status: DISCONTINUED | OUTPATIENT
Start: 2023-11-14 | End: 2023-11-15 | Stop reason: HOSPADM

## 2023-11-14 RX ORDER — FLUMAZENIL 0.1 MG/ML
0.2 INJECTION, SOLUTION INTRAVENOUS
Status: ACTIVE | OUTPATIENT
Start: 2023-11-14 | End: 2023-11-14

## 2023-11-14 RX ORDER — ONDANSETRON 4 MG/1
4 TABLET, ORALLY DISINTEGRATING ORAL EVERY 6 HOURS PRN
Status: DISCONTINUED | OUTPATIENT
Start: 2023-11-14 | End: 2023-11-15 | Stop reason: HOSPADM

## 2023-11-14 RX ORDER — NALOXONE HYDROCHLORIDE 0.4 MG/ML
0.2 INJECTION, SOLUTION INTRAMUSCULAR; INTRAVENOUS; SUBCUTANEOUS
Status: DISCONTINUED | OUTPATIENT
Start: 2023-11-14 | End: 2023-11-15 | Stop reason: HOSPADM

## 2023-11-14 RX ORDER — PROCHLORPERAZINE MALEATE 10 MG
10 TABLET ORAL EVERY 6 HOURS PRN
Status: DISCONTINUED | OUTPATIENT
Start: 2023-11-14 | End: 2023-11-15 | Stop reason: HOSPADM

## 2023-11-14 NOTE — H&P
Sara AMATO [facility]  [id number]  male  53 year old      Reason for procedure/surgery: screening    Patient Active Problem List   Diagnosis    Condyloma acuminata    Human immunodeficiency virus (HIV) disease (H)       Past Surgical History:  History reviewed. No pertinent surgical history.    Past Medical History:   Past Medical History:   Diagnosis Date    Condyloma acuminata     HIV (human immunodeficiency virus infection) (H)     on Biktarvy       Social History:   Social History     Tobacco Use    Smoking status: Some Days     Types: Cigarettes    Smokeless tobacco: Never   Substance Use Topics    Alcohol use: Not Currently       Family History: History reviewed. No pertinent family history.    Allergies: No Known Allergies    Active Medications:   Current Outpatient Medications   Medication Sig Dispense Refill    bisacodyl (DULCOLAX) 5 MG EC tablet Take 2 tablets at 3 pm the day before your procedure. If your procedure is before 11 am, take 2 additional tablets at 11 pm. If your procedure is after 11 am, take 2 additional tablets at 6 am. For additional instructions refer to your colonoscopy prep instructions. 4 tablet 0    loratadine (CLARITIN) 10 MG tablet Take 1 tablet (10 mg) by mouth daily 30 tablet 11    nicotine polacrilex (NICORETTE) 4 MG gum Chew one piece every 1 to 2 hours as directed. 110 each 1    polyethylene glycol (GOLYTELY) 236 g suspension The night before the exam at 6 pm drink an 8-ounce glass every 15 minutes until the jug is half empty. If you arrive before 11 AM: Drink the other half of the Golytely jug at 11 PM night before procedure. If you arrive after 11 AM: Drink the other half of the Golytely jug at 6 AM day of procedure. For additional instructions refer to your colonoscopy prep instructions. 4000 mL 0    SENNA-docusate sodium (SENNA S) 8.6-50 MG tablet Take 1 tablet by mouth 2 times daily as needed (constipation) 60 tablet 11    vitamin B-Complex Take 1 tablet by mouth daily       "Dolutegravir-lamiVUDine  MG TABS Take 1 tablet by mouth daily 30 tablet 3       Systemic Review:   ROS otherwise negative    Physical Examination:   Vital Signs: /71 (BP Location: Right arm)   Pulse 63   Temp 97  F (36.1  C) (Temporal)   Resp 18   Ht 1.727 m (5' 8\")   Wt 64.4 kg (142 lb)   SpO2 99%   BMI 21.59 kg/m    GENERAL: healthy, alert and no distress  HENT: oropharynx clear and oral mucous membranes moist, Mallampati III  NECK: Normal ROM  RESP: lungs clear to auscultation - no rales, rhonchi or wheezes  CV: regular rate and rhythm, normal S1 S2,   ABDOMEN: soft, nontender, and bowel sounds normal  MS: no gross musculoskeletal defects noted, no edema      Plan: Appropriate to proceed as scheduled.      Hali Topete MD  11/14/2023    PCP:  No Ref-Primary, Physician    "

## 2024-02-14 ENCOUNTER — TELEPHONE (OUTPATIENT)
Dept: INFECTIOUS DISEASES | Facility: CLINIC | Age: 54
End: 2024-02-14
Payer: COMMERCIAL

## 2024-02-14 NOTE — TELEPHONE ENCOUNTER
EP called 2/14 to resched 4/30 follow up with Dr. Lomax; provider not avail. Also resched spouse's appt through him and sched labs for both per his request.

## 2024-02-15 ENCOUNTER — LAB (OUTPATIENT)
Dept: LAB | Facility: CLINIC | Age: 54
End: 2024-02-15
Payer: COMMERCIAL

## 2024-02-15 ENCOUNTER — DOCUMENTATION ONLY (OUTPATIENT)
Dept: INFECTIOUS DISEASES | Facility: CLINIC | Age: 54
End: 2024-02-15

## 2024-02-15 DIAGNOSIS — B20 HUMAN IMMUNODEFICIENCY VIRUS (HIV) DISEASE (H): ICD-10-CM

## 2024-02-15 DIAGNOSIS — B20 HUMAN IMMUNODEFICIENCY VIRUS (HIV) DISEASE (H): Primary | ICD-10-CM

## 2024-02-15 LAB
ALBUMIN SERPL BCG-MCNC: 4 G/DL (ref 3.5–5.2)
ALP SERPL-CCNC: 65 U/L (ref 40–150)
ALT SERPL W P-5'-P-CCNC: 10 U/L (ref 0–70)
ANION GAP SERPL CALCULATED.3IONS-SCNC: 10 MMOL/L (ref 7–15)
AST SERPL W P-5'-P-CCNC: 16 U/L (ref 0–45)
BASOPHILS # BLD AUTO: 0.1 10E3/UL (ref 0–0.2)
BASOPHILS NFR BLD AUTO: 1 %
BILIRUB SERPL-MCNC: 0.5 MG/DL
BUN SERPL-MCNC: 13.4 MG/DL (ref 6–20)
CALCIUM SERPL-MCNC: 9.3 MG/DL (ref 8.6–10)
CD3 CELLS # BLD: 2311 CELLS/UL (ref 603–2990)
CD3 CELLS NFR BLD: 80 % (ref 49–84)
CD3+CD4+ CELLS # BLD: 1367 CELLS/UL (ref 441–2156)
CD3+CD4+ CELLS NFR BLD: 47 % (ref 28–63)
CD3+CD4+ CELLS/CD3+CD8+ CLL BLD: 1.58 % (ref 1.4–2.6)
CD3+CD8+ CELLS # BLD: 864 CELLS/UL (ref 125–1312)
CD3+CD8+ CELLS NFR BLD: 30 % (ref 10–40)
CHLORIDE SERPL-SCNC: 107 MMOL/L (ref 98–107)
CREAT SERPL-MCNC: 0.97 MG/DL (ref 0.67–1.17)
DEPRECATED HCO3 PLAS-SCNC: 23 MMOL/L (ref 22–29)
EGFRCR SERPLBLD CKD-EPI 2021: >90 ML/MIN/1.73M2
EOSINOPHIL # BLD AUTO: 0.1 10E3/UL (ref 0–0.7)
EOSINOPHIL NFR BLD AUTO: 2 %
ERYTHROCYTE [DISTWIDTH] IN BLOOD BY AUTOMATED COUNT: 12.8 % (ref 10–15)
GLUCOSE SERPL-MCNC: 118 MG/DL (ref 70–99)
HCT VFR BLD AUTO: 43.5 % (ref 40–53)
HGB BLD-MCNC: 15.5 G/DL (ref 13.3–17.7)
IMM GRANULOCYTES # BLD: 0 10E3/UL
IMM GRANULOCYTES NFR BLD: 0 %
LYMPHOCYTES # BLD AUTO: 2.9 10E3/UL (ref 0.8–5.3)
LYMPHOCYTES NFR BLD AUTO: 39 %
MCH RBC QN AUTO: 31.1 PG (ref 26.5–33)
MCHC RBC AUTO-ENTMCNC: 35.6 G/DL (ref 31.5–36.5)
MCV RBC AUTO: 87 FL (ref 78–100)
MONOCYTES # BLD AUTO: 0.4 10E3/UL (ref 0–1.3)
MONOCYTES NFR BLD AUTO: 6 %
NEUTROPHILS # BLD AUTO: 3.9 10E3/UL (ref 1.6–8.3)
NEUTROPHILS NFR BLD AUTO: 52 %
NRBC # BLD AUTO: 0 10E3/UL
NRBC BLD AUTO-RTO: 0 /100
PLATELET # BLD AUTO: 283 10E3/UL (ref 150–450)
POTASSIUM SERPL-SCNC: 3.7 MMOL/L (ref 3.4–5.3)
PROT SERPL-MCNC: 6.7 G/DL (ref 6.4–8.3)
RBC # BLD AUTO: 4.99 10E6/UL (ref 4.4–5.9)
SODIUM SERPL-SCNC: 140 MMOL/L (ref 135–145)
T CELL COMMENT: NORMAL
WBC # BLD AUTO: 7.4 10E3/UL (ref 4–11)

## 2024-02-15 PROCEDURE — 80053 COMPREHEN METABOLIC PANEL: CPT | Performed by: PATHOLOGY

## 2024-02-15 PROCEDURE — 86359 T CELLS TOTAL COUNT: CPT | Performed by: INTERNAL MEDICINE

## 2024-02-15 PROCEDURE — 87536 HIV-1 QUANT&REVRSE TRNSCRPJ: CPT | Performed by: INTERNAL MEDICINE

## 2024-02-15 PROCEDURE — 85025 COMPLETE CBC W/AUTO DIFF WBC: CPT | Performed by: PATHOLOGY

## 2024-02-15 PROCEDURE — 36415 COLL VENOUS BLD VENIPUNCTURE: CPT | Performed by: PATHOLOGY

## 2024-02-15 PROCEDURE — 86360 T CELL ABSOLUTE COUNT/RATIO: CPT | Performed by: INTERNAL MEDICINE

## 2024-02-15 PROCEDURE — 99000 SPECIMEN HANDLING OFFICE-LAB: CPT | Performed by: PATHOLOGY

## 2024-02-15 NOTE — PROGRESS NOTES
Patient left Insurance enrollment forms at  of clinic. The forms have not been started yet and nothing on the forms needs to be completed by a provider. Attempted to reach patient x2 to let him know we are not able to complete these on his behalf as we do not hay necessary information (income, ages of children, documentation proof, etc...) No answer. Will mail forms back to patient letting him know this as he is not active in [employer]. Forms placed in outgoing mail.

## 2024-02-17 LAB
HIV1 RNA # PLAS NAA DL=20: <20 COPIES/ML
HIV1 RNA SERPL NAA+PROBE-LOG#: <1.3 {LOG_COPIES}/ML

## 2024-02-27 DIAGNOSIS — B20 HUMAN IMMUNODEFICIENCY VIRUS (HIV) DISEASE (H): ICD-10-CM

## 2024-02-27 RX ORDER — DOLUTEGRAVIR SODIUM AND LAMIVUDINE 50; 300 MG/1; MG/1
1 TABLET, FILM COATED ORAL DAILY
Qty: 90 TABLET | Refills: 1 | Status: SHIPPED | OUTPATIENT
Start: 2024-02-27 | End: 2024-09-04

## 2024-02-27 NOTE — TELEPHONE ENCOUNTER
Per [facility] Protocol, Pt is due for refill based on last refill date.   Pt has seen provider within the past year, or has appt scheduled with provider.     Med refill script E-sent to pt's pharmacy.    Signed Prescriptions:                        Disp   Refills    Dolutegravir-lamiVUDine (DOVATO)  MG*90 tab*1        Sig: TAKE ONE TABLET BY MOUTH ONCE DAILY  Authorizing Provider: PRASHANT BRITT  Ordering User: VAIBHAV PINON RN  Infectious Disease 02/27/24 10:08 AM

## 2024-06-19 ENCOUNTER — TELEPHONE (OUTPATIENT)
Dept: INFECTIOUS DISEASES | Facility: CLINIC | Age: 54
End: 2024-06-19
Payer: COMMERCIAL

## 2024-06-19 NOTE — TELEPHONE ENCOUNTER
[facility] + johnnyp called 6/19 to resched July labs and follow up with Dr. Lomax (7/30); provider not avail. And also resched wife's appts too.

## 2024-07-14 ENCOUNTER — HEALTH MAINTENANCE LETTER (OUTPATIENT)
Age: 54
End: 2024-07-14

## 2024-09-04 ENCOUNTER — PATIENT OUTREACH (OUTPATIENT)
Dept: INFECTIOUS DISEASES | Facility: CLINIC | Age: 54
End: 2024-09-04

## 2024-09-04 ENCOUNTER — LAB (OUTPATIENT)
Dept: LAB | Facility: CLINIC | Age: 54
End: 2024-09-04
Payer: COMMERCIAL

## 2024-09-04 DIAGNOSIS — B20 HUMAN IMMUNODEFICIENCY VIRUS (HIV) DISEASE (H): Primary | ICD-10-CM

## 2024-09-04 DIAGNOSIS — B20 HUMAN IMMUNODEFICIENCY VIRUS (HIV) DISEASE (H): ICD-10-CM

## 2024-09-04 PROCEDURE — 86359 T CELLS TOTAL COUNT: CPT | Performed by: INTERNAL MEDICINE

## 2024-09-04 PROCEDURE — 86780 TREPONEMA PALLIDUM: CPT | Performed by: INTERNAL MEDICINE

## 2024-09-04 PROCEDURE — 36415 COLL VENOUS BLD VENIPUNCTURE: CPT | Performed by: PATHOLOGY

## 2024-09-04 PROCEDURE — 99000 SPECIMEN HANDLING OFFICE-LAB: CPT | Performed by: PATHOLOGY

## 2024-09-04 PROCEDURE — 87536 HIV-1 QUANT&REVRSE TRNSCRPJ: CPT | Performed by: INTERNAL MEDICINE

## 2024-09-04 PROCEDURE — 86360 T CELL ABSOLUTE COUNT/RATIO: CPT | Performed by: INTERNAL MEDICINE

## 2024-09-04 RX ORDER — DOLUTEGRAVIR SODIUM AND LAMIVUDINE 50; 300 MG/1; MG/1
1 TABLET, FILM COATED ORAL DAILY
Qty: 90 TABLET | Refills: 0 | Status: SHIPPED | OUTPATIENT
Start: 2024-09-04

## 2024-09-04 NOTE — TELEPHONE ENCOUNTER
"Medication Refill                                                     Refill request receive for:  Dovato    Last refill: 02/27/2024    Last Office Visit 10/17/2023  Future appt scheduled? Yes: 09/24/2024     POC for medication if indicated from last note including duration of treatment if applicable: yes      RECENT LABS/VITALS                                                        Lab Results   Component Value Date    AST 16 02/15/2024    AST 17 05/06/2021     Lab Results   Component Value Date    ALT 10 02/15/2024    ALT 32 05/06/2021     Creatinine   Date Value Ref Range Status   02/15/2024 0.97 0.67 - 1.17 mg/dL Final   05/06/2021 1.00 0.66 - 1.25 mg/dL Final   ]  Alkaline Phosphatase   Date/Time Value Ref Range Status   02/15/2024 02:46 PM 65 40 - 150 U/L Final     Comment:     Reference intervals for this test were updated on 11/14/2023 to more accurately reflect our healthy population. There may be differences in the flagging of prior results with similar values performed with this method. Interpretation of those prior results can be made in the context of the updated reference intervals.   05/06/2021 10:44 AM 86 40 - 150 U/L Final     No results found for: \"LABAPCBCDIFF\"                  "

## 2024-09-04 NOTE — TELEPHONE ENCOUNTER
Social Work - Intervention  [facility]  Data/Intervention: 2024    Patient Name: Sara Benjamin Goes By: Sara KOEHLER/Age: 1970 (54 year old)     Visit Type: In Person   Referral Source: Self-Walk In   Reason for Referral: Medication issue     Collaborated With:    -Patient   -RN Team  -Pharmacy      Psychosocial Information/Concerns:  Patient walked in to the clinic stating he had a medication issue and requested social work.      Intervention/Education/Resources Provided:  Writer met with Patient who reported that the Pharmacy did not fill his medications and he did not understand why. Writer walked with Patient down to the Pharmacy and spoke to a representative. Patient was told that he had no more refills. Writer coordinated with RN Team for refill, Patient has an appointment with Dr. Lomax on 10/1 scheduled. Patient also needs labs, which was scheduled while he is here in the building today.      Assessment/Plan:  Patient will  his medications and complete labs today. Patient thanked Writer for the assistance.

## 2024-09-05 LAB
CD3 CELLS # BLD: 2717 CELLS/UL (ref 603–2990)
CD3 CELLS NFR BLD: 82 % (ref 49–84)
CD3+CD4+ CELLS # BLD: 1696 CELLS/UL (ref 441–2156)
CD3+CD4+ CELLS NFR BLD: 51 % (ref 28–63)
CD3+CD4+ CELLS/CD3+CD8+ CLL BLD: 1.8 % (ref 1.4–2.6)
CD3+CD8+ CELLS # BLD: 944 CELLS/UL (ref 125–1312)
CD3+CD8+ CELLS NFR BLD: 29 % (ref 10–40)
HIV1 RNA # PLAS NAA DL=20: NOT DETECTED COPIES/ML
T CELL COMMENT: NORMAL
T PALLIDUM AB SER QL: NONREACTIVE

## 2024-09-19 ENCOUNTER — TELEPHONE (OUTPATIENT)
Dept: INFECTIOUS DISEASES | Facility: CLINIC | Age: 54
End: 2024-09-19
Payer: COMMERCIAL

## 2024-09-19 NOTE — TELEPHONE ENCOUNTER
EP called 9/19 to resched 10/1 follow up with Dr. Lomax; provider not avail. Gave pt option to see another provider but he didn't want to.

## 2024-10-03 ENCOUNTER — DOCUMENTATION ONLY (OUTPATIENT)
Dept: INFECTIOUS DISEASES | Facility: CLINIC | Age: 54
End: 2024-10-03
Payer: COMMERCIAL

## 2024-10-03 NOTE — PROGRESS NOTES
ID Clinic Walk-In Visit Note    Provider: Dr. Lomax    Date of last visit: 10/17/24    Reason for visit: Was unable to  medications due to insurance terming.     Date patient is due for labs & appointment: Patient scheduled for clinic follow up on 01/07, no need for labs at this time.     Follow up items needed for patient: Advised patient to call [facility] to determine what needs to happen to renew him and his wife's insurance. Requested patient call the clinic to let us know the result of this conversation and when he will be reinsured. Informed him we may be able to assist with connecting him with temporary resources to assist with medication coverage depending on when insurance will be reinstated. Will route to AIDEN Martinez as an FYI.

## 2024-10-04 NOTE — PROGRESS NOTES
ID Clinic Walk-In Visit Note    Provider: Dr. Lomax    Date of last visit: 10/17/23    Reason for visit: Insurance termed    Date patient is due for labs & appointment: Patient scheduled for clinic follow up on 01/07, no need for labs at this time.     Follow up items needed for patient: Patient returned to clinic today with his wife with a piece of mail from [facility] dated 09/26/24 that informed patient him and his wife's insurance would term on 09/30/24 if they don't send in proof of income. Patient did not see this piece of mail in time to reach out. Assisted patient in finding the contact information for two [facility] insurance navigators that speak his preferred language of Serbian. Patient to call them and let us know what they say. Patient concerned about medication coverage as his wife is out of medications. Informed him the first step is calling insurance and determining when they will be covered again.

## 2024-11-07 ENCOUNTER — DOCUMENTATION ONLY (OUTPATIENT)
Dept: INFECTIOUS DISEASES | Facility: CLINIC | Age: 54
End: 2024-11-07
Payer: COMMERCIAL

## 2024-11-07 NOTE — PROGRESS NOTES
ID Clinic Walk-In Visit Note    Provider: Dr. Lomax    Date of last visit: 10/17/23    Reason for visit: Insurance    Date patient is due for labs & appointment: Now    Follow up items needed for patient:  Patient arrived in clinic with his wife requesting lab results. Use [employer]  with assistance from AIDEN Martinez to assist patient and wife Yerusalem with their needs. Patient reports he went to [facility] and his insurance paperwork is completed and in process. He was seen by a doctor at [facility] and they require him and his wife's most recent lab results to be able to help bridge their medication while awaiting insurance coverage. Provided lab results for patient and his wife who will go back to [facility] to seek further care while awaiting insurance reinstatement.

## (undated) DEVICE — GOWN IMPERVIOUS 2XL BLUE

## (undated) DEVICE — SOL WATER IRRIG 500ML BOTTLE 2F7113

## (undated) DEVICE — SUCTION MANIFOLD NEPTUNE 2 SYS 1 PORT 702-025-000

## (undated) DEVICE — SNARE CAPIVATOR ROUND COLD SNR BX10 M00561101

## (undated) DEVICE — SPECIMEN CONTAINER 3OZ W/FORMALIN 59901

## (undated) DEVICE — TUBING SUCTION MEDI-VAC 1/4"X20' N620A

## (undated) DEVICE — KIT ENDO TURNOVER/PROCEDURE CARRY-ON 101822

## (undated) RX ORDER — FENTANYL CITRATE 50 UG/ML
INJECTION, SOLUTION INTRAMUSCULAR; INTRAVENOUS
Status: DISPENSED
Start: 2023-11-14